# Patient Record
Sex: MALE | Race: WHITE | Employment: UNEMPLOYED | URBAN - METROPOLITAN AREA
[De-identification: names, ages, dates, MRNs, and addresses within clinical notes are randomized per-mention and may not be internally consistent; named-entity substitution may affect disease eponyms.]

---

## 2017-03-17 ENCOUNTER — HOSPITAL ENCOUNTER (OUTPATIENT)
Dept: PREADMISSION TESTING | Age: 57
Discharge: HOME | End: 2017-03-17
Payer: COMMERCIAL

## 2017-03-17 VITALS
SYSTOLIC BLOOD PRESSURE: 123 MMHG | OXYGEN SATURATION: 98 % | HEIGHT: 72 IN | BODY MASS INDEX: 30.22 KG/M2 | WEIGHT: 223.11 LBS | DIASTOLIC BLOOD PRESSURE: 71 MMHG | HEART RATE: 79 BPM

## 2017-03-17 LAB
ABSOLUTE EOS #: 0.2 K/UL (ref 0–0.4)
ABSOLUTE LYMPH #: 1.5 K/UL (ref 1–4.8)
ABSOLUTE MONO #: 0.5 K/UL (ref 0.2–0.8)
BASOPHILS # BLD: 0 % (ref 0–2)
BASOPHILS ABSOLUTE: 0 K/UL (ref 0–0.2)
DIFFERENTIAL TYPE: ABNORMAL
EKG ATRIAL RATE: 91 BPM
EKG P AXIS: 53 DEGREES
EKG P-R INTERVAL: 152 MS
EKG Q-T INTERVAL: 366 MS
EKG QRS DURATION: 86 MS
EKG QTC CALCULATION (BAZETT): 450 MS
EKG R AXIS: -7 DEGREES
EKG T AXIS: 33 DEGREES
EKG VENTRICULAR RATE: 91 BPM
EOSINOPHILS RELATIVE PERCENT: 3 % (ref 1–4)
HCT VFR BLD CALC: 39.1 % (ref 41–53)
HEMOGLOBIN: 13.6 G/DL (ref 13.5–17.5)
LYMPHOCYTES # BLD: 24 % (ref 24–44)
MCH RBC QN AUTO: 31.1 PG (ref 26–34)
MCHC RBC AUTO-ENTMCNC: 34.8 G/DL (ref 31–37)
MCV RBC AUTO: 89.4 FL (ref 80–100)
MONOCYTES # BLD: 9 % (ref 1–7)
PDW BLD-RTO: 12.6 % (ref 11.5–14.5)
PLATELET # BLD: 192 K/UL (ref 130–400)
PLATELET ESTIMATE: ABNORMAL
PMV BLD AUTO: 8.4 FL (ref 6–12)
RBC # BLD: 4.38 M/UL (ref 4.5–5.9)
RBC # BLD: ABNORMAL 10*6/UL
SEG NEUTROPHILS: 64 % (ref 36–66)
SEGMENTED NEUTROPHILS ABSOLUTE COUNT: 3.8 K/UL (ref 1.8–7.7)
WBC # BLD: 6.1 K/UL (ref 3.5–11)
WBC # BLD: ABNORMAL 10*3/UL

## 2017-03-17 PROCEDURE — 85025 COMPLETE CBC W/AUTO DIFF WBC: CPT

## 2017-03-17 PROCEDURE — 93005 ELECTROCARDIOGRAM TRACING: CPT

## 2017-03-17 PROCEDURE — 36415 COLL VENOUS BLD VENIPUNCTURE: CPT

## 2017-03-17 ASSESSMENT — PAIN DESCRIPTION - DESCRIPTORS: DESCRIPTORS: THROBBING;CONSTANT

## 2017-03-17 ASSESSMENT — PAIN DESCRIPTION - LOCATION: LOCATION: BACK

## 2017-03-17 ASSESSMENT — PAIN DESCRIPTION - ORIENTATION: ORIENTATION: LOWER

## 2017-03-17 ASSESSMENT — PAIN SCALES - GENERAL: PAINLEVEL_OUTOF10: 9

## 2017-03-29 ENCOUNTER — ANESTHESIA EVENT (OUTPATIENT)
Dept: OPERATING ROOM | Age: 57
End: 2017-03-29
Payer: COMMERCIAL

## 2017-03-30 ENCOUNTER — ANESTHESIA (OUTPATIENT)
Dept: OPERATING ROOM | Age: 57
End: 2017-03-30
Payer: COMMERCIAL

## 2017-03-30 ENCOUNTER — HOSPITAL ENCOUNTER (OUTPATIENT)
Age: 57
Setting detail: OBSERVATION
Discharge: HOME | End: 2017-03-31
Attending: PODIATRIST | Admitting: PODIATRIST
Payer: COMMERCIAL

## 2017-03-30 VITALS — SYSTOLIC BLOOD PRESSURE: 118 MMHG | DIASTOLIC BLOOD PRESSURE: 57 MMHG | OXYGEN SATURATION: 97 % | TEMPERATURE: 95.5 F

## 2017-03-30 PROCEDURE — 7100000010 HC PHASE II RECOVERY - FIRST 15 MIN: Performed by: PODIATRIST

## 2017-03-30 PROCEDURE — 7100000000 HC PACU RECOVERY - FIRST 15 MIN: Performed by: PODIATRIST

## 2017-03-30 PROCEDURE — 88302 TISSUE EXAM BY PATHOLOGIST: CPT

## 2017-03-30 PROCEDURE — 6360000002 HC RX W HCPCS: Performed by: NURSE ANESTHETIST, CERTIFIED REGISTERED

## 2017-03-30 PROCEDURE — 3600000002 HC SURGERY LEVEL 2 BASE: Performed by: PODIATRIST

## 2017-03-30 PROCEDURE — 64446 NJX AA&/STRD SC NRV NFS IMG: CPT | Performed by: ANESTHESIOLOGY

## 2017-03-30 PROCEDURE — 2500000003 HC RX 250 WO HCPCS: Performed by: NURSE ANESTHETIST, CERTIFIED REGISTERED

## 2017-03-30 PROCEDURE — 7100000001 HC PACU RECOVERY - ADDTL 15 MIN: Performed by: PODIATRIST

## 2017-03-30 PROCEDURE — 2500000003 HC RX 250 WO HCPCS: Performed by: PODIATRIST

## 2017-03-30 PROCEDURE — G0378 HOSPITAL OBSERVATION PER HR: HCPCS

## 2017-03-30 PROCEDURE — 2500000003 HC RX 250 WO HCPCS

## 2017-03-30 PROCEDURE — 6370000000 HC RX 637 (ALT 250 FOR IP): Performed by: PODIATRIST

## 2017-03-30 PROCEDURE — 3600000012 HC SURGERY LEVEL 2 ADDTL 15MIN: Performed by: PODIATRIST

## 2017-03-30 PROCEDURE — 2580000003 HC RX 258: Performed by: PODIATRIST

## 2017-03-30 PROCEDURE — 2580000003 HC RX 258: Performed by: ANESTHESIOLOGY

## 2017-03-30 PROCEDURE — 6370000000 HC RX 637 (ALT 250 FOR IP): Performed by: ANESTHESIOLOGY

## 2017-03-30 PROCEDURE — 7100000011 HC PHASE II RECOVERY - ADDTL 15 MIN: Performed by: PODIATRIST

## 2017-03-30 PROCEDURE — 3700000000 HC ANESTHESIA ATTENDED CARE: Performed by: PODIATRIST

## 2017-03-30 PROCEDURE — 3700000001 HC ADD 15 MINUTES (ANESTHESIA): Performed by: PODIATRIST

## 2017-03-30 DEVICE — IMPLANTABLE DEVICE: Type: IMPLANTABLE DEVICE | Site: ACHILLES TENDON | Status: FUNCTIONAL

## 2017-03-30 RX ORDER — SODIUM CHLORIDE 0.9 % (FLUSH) 0.9 %
10 SYRINGE (ML) INJECTION EVERY 12 HOURS SCHEDULED
Status: DISCONTINUED | OUTPATIENT
Start: 2017-03-30 | End: 2017-03-31 | Stop reason: HOSPADM

## 2017-03-30 RX ORDER — MORPHINE SULFATE 4 MG/ML
4 INJECTION, SOLUTION INTRAMUSCULAR; INTRAVENOUS
Status: DISCONTINUED | OUTPATIENT
Start: 2017-03-30 | End: 2017-03-31

## 2017-03-30 RX ORDER — SODIUM CHLORIDE 0.9 % (FLUSH) 0.9 %
10 SYRINGE (ML) INJECTION PRN
Status: DISCONTINUED | OUTPATIENT
Start: 2017-03-30 | End: 2017-03-30 | Stop reason: HOSPADM

## 2017-03-30 RX ORDER — SUCCINYLCHOLINE CHLORIDE 20 MG/ML
INJECTION INTRAMUSCULAR; INTRAVENOUS PRN
Status: DISCONTINUED | OUTPATIENT
Start: 2017-03-30 | End: 2017-03-30 | Stop reason: SDUPTHER

## 2017-03-30 RX ORDER — CLINDAMYCIN PHOSPHATE 900 MG/50ML
900 INJECTION INTRAVENOUS ONCE
Status: COMPLETED | OUTPATIENT
Start: 2017-03-30 | End: 2017-03-30

## 2017-03-30 RX ORDER — SODIUM CHLORIDE 0.9 % (FLUSH) 0.9 %
10 SYRINGE (ML) INJECTION EVERY 12 HOURS SCHEDULED
Status: DISCONTINUED | OUTPATIENT
Start: 2017-03-30 | End: 2017-03-30 | Stop reason: HOSPADM

## 2017-03-30 RX ORDER — FENTANYL CITRATE 50 UG/ML
50 INJECTION, SOLUTION INTRAMUSCULAR; INTRAVENOUS EVERY 5 MIN PRN
Status: DISCONTINUED | OUTPATIENT
Start: 2017-03-30 | End: 2017-03-30 | Stop reason: HOSPADM

## 2017-03-30 RX ORDER — KETAMINE HYDROCHLORIDE 100 MG/ML
INJECTION, SOLUTION INTRAMUSCULAR; INTRAVENOUS PRN
Status: DISCONTINUED | OUTPATIENT
Start: 2017-03-30 | End: 2017-03-30 | Stop reason: SDUPTHER

## 2017-03-30 RX ORDER — ALFUZOSIN HYDROCHLORIDE 10 MG/1
10 TABLET, EXTENDED RELEASE ORAL DAILY
Status: DISCONTINUED | OUTPATIENT
Start: 2017-03-30 | End: 2017-03-30 | Stop reason: CLARIF

## 2017-03-30 RX ORDER — PREGABALIN 100 MG/1
100 CAPSULE ORAL DAILY
Status: DISCONTINUED | OUTPATIENT
Start: 2017-03-30 | End: 2017-03-31 | Stop reason: HOSPADM

## 2017-03-30 RX ORDER — DEXAMETHASONE SODIUM PHOSPHATE 10 MG/ML
INJECTION INTRAMUSCULAR; INTRAVENOUS PRN
Status: DISCONTINUED | OUTPATIENT
Start: 2017-03-30 | End: 2017-03-30 | Stop reason: SDUPTHER

## 2017-03-30 RX ORDER — OXYCODONE HYDROCHLORIDE AND ACETAMINOPHEN 5; 325 MG/1; MG/1
1 TABLET ORAL EVERY 4 HOURS PRN
Status: DISCONTINUED | OUTPATIENT
Start: 2017-03-30 | End: 2017-03-31 | Stop reason: HOSPADM

## 2017-03-30 RX ORDER — HYDROMORPHONE HCL 110MG/55ML
0.25 PATIENT CONTROLLED ANALGESIA SYRINGE INTRAVENOUS EVERY 5 MIN PRN
Status: DISCONTINUED | OUTPATIENT
Start: 2017-03-30 | End: 2017-03-30 | Stop reason: HOSPADM

## 2017-03-30 RX ORDER — GLYCOPYRROLATE 0.2 MG/ML
INJECTION INTRAMUSCULAR; INTRAVENOUS PRN
Status: DISCONTINUED | OUTPATIENT
Start: 2017-03-30 | End: 2017-03-30 | Stop reason: SDUPTHER

## 2017-03-30 RX ORDER — HYDROMORPHONE HCL 110MG/55ML
0.5 PATIENT CONTROLLED ANALGESIA SYRINGE INTRAVENOUS EVERY 5 MIN PRN
Status: DISCONTINUED | OUTPATIENT
Start: 2017-03-30 | End: 2017-03-30 | Stop reason: HOSPADM

## 2017-03-30 RX ORDER — SCOLOPAMINE TRANSDERMAL SYSTEM 1 MG/1
1 PATCH, EXTENDED RELEASE TRANSDERMAL ONCE
Status: DISCONTINUED | OUTPATIENT
Start: 2017-03-30 | End: 2017-03-30

## 2017-03-30 RX ORDER — FENTANYL CITRATE 50 UG/ML
25 INJECTION, SOLUTION INTRAMUSCULAR; INTRAVENOUS EVERY 5 MIN PRN
Status: DISCONTINUED | OUTPATIENT
Start: 2017-03-30 | End: 2017-03-30 | Stop reason: HOSPADM

## 2017-03-30 RX ORDER — FENTANYL CITRATE 50 UG/ML
INJECTION, SOLUTION INTRAMUSCULAR; INTRAVENOUS PRN
Status: DISCONTINUED | OUTPATIENT
Start: 2017-03-30 | End: 2017-03-30 | Stop reason: SDUPTHER

## 2017-03-30 RX ORDER — ONDANSETRON 2 MG/ML
INJECTION INTRAMUSCULAR; INTRAVENOUS PRN
Status: DISCONTINUED | OUTPATIENT
Start: 2017-03-30 | End: 2017-03-30 | Stop reason: SDUPTHER

## 2017-03-30 RX ORDER — ZOLPIDEM TARTRATE 5 MG/1
5 TABLET ORAL NIGHTLY PRN
Status: DISCONTINUED | OUTPATIENT
Start: 2017-03-30 | End: 2017-03-31 | Stop reason: HOSPADM

## 2017-03-30 RX ORDER — BACLOFEN 10 MG/1
10 TABLET ORAL PRN
Status: DISCONTINUED | OUTPATIENT
Start: 2017-03-30 | End: 2017-03-31 | Stop reason: HOSPADM

## 2017-03-30 RX ORDER — BUTALBITAL, ACETAMINOPHEN AND CAFFEINE 50; 325; 40 MG/1; MG/1; MG/1
1 TABLET ORAL EVERY 6 HOURS PRN
Status: DISCONTINUED | OUTPATIENT
Start: 2017-03-30 | End: 2017-03-31 | Stop reason: HOSPADM

## 2017-03-30 RX ORDER — PROMETHAZINE HYDROCHLORIDE 25 MG/1
25 TABLET ORAL DAILY PRN
Status: DISCONTINUED | OUTPATIENT
Start: 2017-03-30 | End: 2017-03-31 | Stop reason: HOSPADM

## 2017-03-30 RX ORDER — OXYCODONE HYDROCHLORIDE AND ACETAMINOPHEN 5; 325 MG/1; MG/1
1 TABLET ORAL EVERY 6 HOURS PRN
Qty: 40 TABLET | Refills: 0 | Status: SHIPPED | OUTPATIENT
Start: 2017-03-30 | End: 2017-04-06

## 2017-03-30 RX ORDER — ACETAMINOPHEN 325 MG/1
650 TABLET ORAL EVERY 4 HOURS PRN
Status: DISCONTINUED | OUTPATIENT
Start: 2017-03-30 | End: 2017-03-31 | Stop reason: HOSPADM

## 2017-03-30 RX ORDER — ROCURONIUM BROMIDE 10 MG/ML
INJECTION, SOLUTION INTRAVENOUS PRN
Status: DISCONTINUED | OUTPATIENT
Start: 2017-03-30 | End: 2017-03-30 | Stop reason: SDUPTHER

## 2017-03-30 RX ORDER — SODIUM CHLORIDE 0.9 % (FLUSH) 0.9 %
10 SYRINGE (ML) INJECTION PRN
Status: DISCONTINUED | OUTPATIENT
Start: 2017-03-30 | End: 2017-03-31 | Stop reason: HOSPADM

## 2017-03-30 RX ORDER — DIVALPROEX SODIUM 250 MG/1
250 TABLET, EXTENDED RELEASE ORAL 2 TIMES DAILY
Status: DISCONTINUED | OUTPATIENT
Start: 2017-03-30 | End: 2017-03-31 | Stop reason: HOSPADM

## 2017-03-30 RX ORDER — LIDOCAINE HYDROCHLORIDE 10 MG/ML
1 INJECTION, SOLUTION EPIDURAL; INFILTRATION; INTRACAUDAL; PERINEURAL
Status: DISCONTINUED | OUTPATIENT
Start: 2017-03-31 | End: 2017-03-30 | Stop reason: HOSPADM

## 2017-03-30 RX ORDER — CYCLOBENZAPRINE HCL 5 MG
5 TABLET ORAL DAILY PRN
Status: DISCONTINUED | OUTPATIENT
Start: 2017-03-30 | End: 2017-03-31 | Stop reason: HOSPADM

## 2017-03-30 RX ORDER — MORPHINE SULFATE 2 MG/ML
2 INJECTION, SOLUTION INTRAMUSCULAR; INTRAVENOUS
Status: DISCONTINUED | OUTPATIENT
Start: 2017-03-30 | End: 2017-03-31

## 2017-03-30 RX ORDER — TAMSULOSIN HYDROCHLORIDE 0.4 MG/1
0.4 CAPSULE ORAL DAILY
Status: DISCONTINUED | OUTPATIENT
Start: 2017-03-30 | End: 2017-03-31 | Stop reason: HOSPADM

## 2017-03-30 RX ORDER — MIDAZOLAM HYDROCHLORIDE 1 MG/ML
INJECTION INTRAMUSCULAR; INTRAVENOUS PRN
Status: DISCONTINUED | OUTPATIENT
Start: 2017-03-30 | End: 2017-03-30 | Stop reason: SDUPTHER

## 2017-03-30 RX ORDER — ONDANSETRON 2 MG/ML
4 INJECTION INTRAMUSCULAR; INTRAVENOUS EVERY 6 HOURS PRN
Status: DISCONTINUED | OUTPATIENT
Start: 2017-03-30 | End: 2017-03-31 | Stop reason: HOSPADM

## 2017-03-30 RX ORDER — PROPOFOL 10 MG/ML
INJECTION, EMULSION INTRAVENOUS PRN
Status: DISCONTINUED | OUTPATIENT
Start: 2017-03-30 | End: 2017-03-30 | Stop reason: SDUPTHER

## 2017-03-30 RX ORDER — PROMETHAZINE HYDROCHLORIDE 25 MG/ML
6.25 INJECTION, SOLUTION INTRAMUSCULAR; INTRAVENOUS
Status: DISCONTINUED | OUTPATIENT
Start: 2017-03-30 | End: 2017-03-30 | Stop reason: HOSPADM

## 2017-03-30 RX ORDER — HYDROMORPHONE HCL 110MG/55ML
PATIENT CONTROLLED ANALGESIA SYRINGE INTRAVENOUS PRN
Status: DISCONTINUED | OUTPATIENT
Start: 2017-03-30 | End: 2017-03-30 | Stop reason: SDUPTHER

## 2017-03-30 RX ORDER — PANTOPRAZOLE SODIUM 40 MG/1
40 TABLET, DELAYED RELEASE ORAL
Status: DISCONTINUED | OUTPATIENT
Start: 2017-03-31 | End: 2017-03-31 | Stop reason: HOSPADM

## 2017-03-30 RX ORDER — DIAZEPAM 5 MG/1
5 TABLET ORAL EVERY 8 HOURS PRN
Status: DISCONTINUED | OUTPATIENT
Start: 2017-03-30 | End: 2017-03-31 | Stop reason: HOSPADM

## 2017-03-30 RX ORDER — ONDANSETRON 2 MG/ML
4 INJECTION INTRAMUSCULAR; INTRAVENOUS
Status: DISCONTINUED | OUTPATIENT
Start: 2017-03-30 | End: 2017-03-30 | Stop reason: HOSPADM

## 2017-03-30 RX ORDER — SILDENAFIL 50 MG/1
50 TABLET, FILM COATED ORAL PRN
Status: DISCONTINUED | OUTPATIENT
Start: 2017-03-30 | End: 2017-03-30

## 2017-03-30 RX ORDER — LIDOCAINE HYDROCHLORIDE 20 MG/ML
INJECTION, SOLUTION EPIDURAL; INFILTRATION; INTRACAUDAL; PERINEURAL PRN
Status: DISCONTINUED | OUTPATIENT
Start: 2017-03-30 | End: 2017-03-30 | Stop reason: SDUPTHER

## 2017-03-30 RX ORDER — SODIUM CHLORIDE, SODIUM LACTATE, POTASSIUM CHLORIDE, CALCIUM CHLORIDE 600; 310; 30; 20 MG/100ML; MG/100ML; MG/100ML; MG/100ML
INJECTION, SOLUTION INTRAVENOUS CONTINUOUS
Status: DISCONTINUED | OUTPATIENT
Start: 2017-03-31 | End: 2017-03-30

## 2017-03-30 RX ORDER — LIDOCAINE HYDROCHLORIDE 10 MG/ML
INJECTION, SOLUTION EPIDURAL; INFILTRATION; INTRACAUDAL; PERINEURAL PRN
Status: DISCONTINUED | OUTPATIENT
Start: 2017-03-30 | End: 2017-03-30 | Stop reason: HOSPADM

## 2017-03-30 RX ORDER — SODIUM CHLORIDE 9 MG/ML
INJECTION, SOLUTION INTRAVENOUS CONTINUOUS
Status: DISCONTINUED | OUTPATIENT
Start: 2017-03-31 | End: 2017-03-30

## 2017-03-30 RX ADMIN — PROPOFOL 150 MG: 10 INJECTION, EMULSION INTRAVENOUS at 14:11

## 2017-03-30 RX ADMIN — CYCLOBENZAPRINE HYDROCHLORIDE 5 MG: 5 TABLET, FILM COATED ORAL at 21:58

## 2017-03-30 RX ADMIN — KETAMINE HYDROCHLORIDE 50 MG: 100 INJECTION INTRAMUSCULAR; INTRAVENOUS at 14:15

## 2017-03-30 RX ADMIN — ROCURONIUM BROMIDE 20 MG: 10 INJECTION INTRAVENOUS at 14:25

## 2017-03-30 RX ADMIN — SODIUM CHLORIDE, POTASSIUM CHLORIDE, SODIUM LACTATE AND CALCIUM CHLORIDE: 600; 310; 30; 20 INJECTION, SOLUTION INTRAVENOUS at 12:28

## 2017-03-30 RX ADMIN — MIDAZOLAM 4 MG: 1 INJECTION INTRAMUSCULAR; INTRAVENOUS at 14:07

## 2017-03-30 RX ADMIN — CLINDAMYCIN PHOSPHATE 900 MG: 18 INJECTION, SOLUTION INTRAMUSCULAR; INTRAVENOUS at 14:07

## 2017-03-30 RX ADMIN — HYDROMORPHONE HYDROCHLORIDE 1 MG: 2 INJECTION, SOLUTION INTRAMUSCULAR; INTRAVENOUS; SUBCUTANEOUS at 15:08

## 2017-03-30 RX ADMIN — Medication 10 ML: at 22:02

## 2017-03-30 RX ADMIN — ONDANSETRON 4 MG: 2 INJECTION INTRAMUSCULAR; INTRAVENOUS at 14:07

## 2017-03-30 RX ADMIN — PREGABALIN 100 MG: 100 CAPSULE ORAL at 21:36

## 2017-03-30 RX ADMIN — FENTANYL CITRATE 100 MCG: 50 INJECTION INTRAMUSCULAR; INTRAVENOUS at 14:11

## 2017-03-30 RX ADMIN — HYDROMORPHONE HYDROCHLORIDE 0.5 MG: 2 INJECTION, SOLUTION INTRAMUSCULAR; INTRAVENOUS; SUBCUTANEOUS at 15:17

## 2017-03-30 RX ADMIN — GLYCOPYRROLATE 0.2 MG: 0.2 INJECTION, SOLUTION INTRAMUSCULAR; INTRAVENOUS at 14:44

## 2017-03-30 RX ADMIN — GLYCOPYRROLATE 0.1 MG: 0.2 INJECTION, SOLUTION INTRAMUSCULAR; INTRAVENOUS at 14:07

## 2017-03-30 RX ADMIN — SODIUM CHLORIDE, POTASSIUM CHLORIDE, SODIUM LACTATE AND CALCIUM CHLORIDE: 600; 310; 30; 20 INJECTION, SOLUTION INTRAVENOUS at 12:58

## 2017-03-30 RX ADMIN — SODIUM CHLORIDE, POTASSIUM CHLORIDE, SODIUM LACTATE AND CALCIUM CHLORIDE: 600; 310; 30; 20 INJECTION, SOLUTION INTRAVENOUS at 15:51

## 2017-03-30 RX ADMIN — SUCCINYLCHOLINE CHLORIDE 120 MG: 20 INJECTION, SOLUTION INTRAMUSCULAR; INTRAVENOUS at 14:11

## 2017-03-30 RX ADMIN — HYDROMORPHONE HYDROCHLORIDE 0.5 MG: 2 INJECTION, SOLUTION INTRAMUSCULAR; INTRAVENOUS; SUBCUTANEOUS at 15:20

## 2017-03-30 RX ADMIN — OXYCODONE HYDROCHLORIDE AND ACETAMINOPHEN 1 TABLET: 5; 325 TABLET ORAL at 20:06

## 2017-03-30 RX ADMIN — DEXAMETHASONE SODIUM PHOSPHATE 10 MG: 10 INJECTION INTRAMUSCULAR; INTRAVENOUS at 14:07

## 2017-03-30 RX ADMIN — Medication 750 ML: at 17:25

## 2017-03-30 RX ADMIN — LIDOCAINE HYDROCHLORIDE 100 MG: 20 INJECTION, SOLUTION EPIDURAL; INFILTRATION; INTRACAUDAL; PERINEURAL at 14:11

## 2017-03-30 ASSESSMENT — PAIN DESCRIPTION - DESCRIPTORS
DESCRIPTORS: BURNING;NUMBNESS
DESCRIPTORS: ACHING;DISCOMFORT

## 2017-03-30 ASSESSMENT — ENCOUNTER SYMPTOMS
SHORTNESS OF BREATH: 0
STRIDOR: 0

## 2017-03-30 ASSESSMENT — PAIN DESCRIPTION - LOCATION
LOCATION: LEG
LOCATION: ARM

## 2017-03-30 ASSESSMENT — PAIN SCALES - GENERAL
PAINLEVEL_OUTOF10: 9
PAINLEVEL_OUTOF10: 0
PAINLEVEL_OUTOF10: 10

## 2017-03-30 ASSESSMENT — PAIN DESCRIPTION - ORIENTATION
ORIENTATION: LEFT
ORIENTATION: RIGHT

## 2017-03-30 ASSESSMENT — PAIN DESCRIPTION - PAIN TYPE
TYPE: CHRONIC PAIN
TYPE: CHRONIC PAIN

## 2017-03-30 ASSESSMENT — PAIN - FUNCTIONAL ASSESSMENT: PAIN_FUNCTIONAL_ASSESSMENT: 0-10

## 2017-03-31 VITALS
WEIGHT: 223.11 LBS | OXYGEN SATURATION: 94 % | HEART RATE: 70 BPM | RESPIRATION RATE: 18 BRPM | TEMPERATURE: 97.3 F | DIASTOLIC BLOOD PRESSURE: 70 MMHG | SYSTOLIC BLOOD PRESSURE: 109 MMHG | BODY MASS INDEX: 30.22 KG/M2 | HEIGHT: 72 IN

## 2017-03-31 PROCEDURE — 6360000002 HC RX W HCPCS: Performed by: PODIATRIST

## 2017-03-31 PROCEDURE — 97535 SELF CARE MNGMENT TRAINING: CPT

## 2017-03-31 PROCEDURE — 97530 THERAPEUTIC ACTIVITIES: CPT

## 2017-03-31 PROCEDURE — 97161 PT EVAL LOW COMPLEX 20 MIN: CPT

## 2017-03-31 PROCEDURE — G8988 SELF CARE GOAL STATUS: HCPCS

## 2017-03-31 PROCEDURE — G8987 SELF CARE CURRENT STATUS: HCPCS

## 2017-03-31 PROCEDURE — G8978 MOBILITY CURRENT STATUS: HCPCS

## 2017-03-31 PROCEDURE — G0378 HOSPITAL OBSERVATION PER HR: HCPCS

## 2017-03-31 PROCEDURE — G8979 MOBILITY GOAL STATUS: HCPCS

## 2017-03-31 PROCEDURE — 6370000000 HC RX 637 (ALT 250 FOR IP): Performed by: PODIATRIST

## 2017-03-31 PROCEDURE — 97166 OT EVAL MOD COMPLEX 45 MIN: CPT

## 2017-03-31 PROCEDURE — 96372 THER/PROPH/DIAG INJ SC/IM: CPT

## 2017-03-31 RX ADMIN — PANTOPRAZOLE SODIUM 40 MG: 40 TABLET, DELAYED RELEASE ORAL at 07:40

## 2017-03-31 RX ADMIN — ENOXAPARIN SODIUM 40 MG: 40 INJECTION SUBCUTANEOUS at 08:07

## 2017-03-31 ASSESSMENT — PAIN DESCRIPTION - PROGRESSION
CLINICAL_PROGRESSION: GRADUALLY IMPROVING
CLINICAL_PROGRESSION: RAPIDLY IMPROVING
CLINICAL_PROGRESSION: GRADUALLY WORSENING
CLINICAL_PROGRESSION: GRADUALLY IMPROVING

## 2017-03-31 ASSESSMENT — PAIN DESCRIPTION - DESCRIPTORS
DESCRIPTORS: ACHING;DISCOMFORT;SHARP
DESCRIPTORS: ACHING;STABBING
DESCRIPTORS: ACHING;DISCOMFORT
DESCRIPTORS: ACHING;SHARP

## 2017-03-31 ASSESSMENT — PAIN SCALES - GENERAL
PAINLEVEL_OUTOF10: 5
PAINLEVEL_OUTOF10: 8
PAINLEVEL_OUTOF10: 9
PAINLEVEL_OUTOF10: 2

## 2017-03-31 ASSESSMENT — PAIN DESCRIPTION - PAIN TYPE
TYPE: ACUTE PAIN;SURGICAL PAIN

## 2017-03-31 ASSESSMENT — PAIN DESCRIPTION - LOCATION
LOCATION: FOOT

## 2017-03-31 ASSESSMENT — PAIN DESCRIPTION - ORIENTATION
ORIENTATION: RIGHT

## 2017-03-31 ASSESSMENT — PAIN DESCRIPTION - FREQUENCY
FREQUENCY: CONTINUOUS
FREQUENCY: INTERMITTENT

## 2017-03-31 ASSESSMENT — PAIN DESCRIPTION - ONSET
ONSET: GRADUAL
ONSET: ON-GOING

## 2017-04-03 LAB — SURGICAL PATHOLOGY REPORT: NORMAL

## 2018-02-08 PROBLEM — M84.872: Status: ACTIVE | Noted: 2018-02-08

## 2018-02-08 PROBLEM — I82.451: Status: ACTIVE | Noted: 2018-02-08

## 2018-02-08 PROBLEM — M50.221 DISPLACEMENT OF INTERVERTEBRAL DISC AT C4-C5 LEVEL: Status: ACTIVE | Noted: 2018-02-08

## 2018-02-08 PROBLEM — M54.17 LUMBOSACRAL RADICULOPATHY AT L5: Status: ACTIVE | Noted: 2018-02-08

## 2018-02-08 PROBLEM — G57.32: Status: ACTIVE | Noted: 2018-02-08

## 2018-02-08 PROBLEM — F31.9 BIPOLAR AFFECTIVE DISORDER (HCC): Status: ACTIVE | Noted: 2018-02-08

## 2018-02-08 PROBLEM — M79.18 DIFFUSE MYOFASCIAL PAIN SYNDROME: Status: ACTIVE | Noted: 2018-02-08

## 2018-02-08 PROBLEM — G60.8: Status: ACTIVE | Noted: 2018-02-08

## 2018-02-08 PROBLEM — M19.022 ARTHRITIS OF ELBOW, LEFT: Status: ACTIVE | Noted: 2018-02-08

## 2018-02-08 PROBLEM — G56.22 CUBITAL TUNNEL SYNDROME ON LEFT: Status: ACTIVE | Noted: 2018-02-08

## 2018-09-25 PROBLEM — Z86.010 HISTORY OF COLON POLYPS: Status: ACTIVE | Noted: 2018-09-25

## 2018-09-25 PROBLEM — K62.5 RECTAL BLEED: Status: ACTIVE | Noted: 2018-09-25

## 2018-09-25 PROBLEM — Z86.0100 HISTORY OF COLON POLYPS: Status: ACTIVE | Noted: 2018-09-25

## 2018-09-25 PROBLEM — R53.82 CHRONIC FATIGUE: Status: ACTIVE | Noted: 2018-09-25

## 2018-09-25 PROBLEM — E66.3 OVERWEIGHT (BMI 25.0-29.9): Status: ACTIVE | Noted: 2018-09-25

## 2018-09-25 PROBLEM — R73.01 IMPAIRED FASTING GLUCOSE: Status: ACTIVE | Noted: 2018-09-25

## 2019-01-15 ENCOUNTER — OFFICE VISIT (OUTPATIENT)
Dept: FAMILY MEDICINE CLINIC | Age: 59
End: 2019-01-15
Payer: COMMERCIAL

## 2019-01-15 VITALS
DIASTOLIC BLOOD PRESSURE: 84 MMHG | TEMPERATURE: 98 F | BODY MASS INDEX: 28.47 KG/M2 | HEART RATE: 71 BPM | HEIGHT: 75 IN | SYSTOLIC BLOOD PRESSURE: 135 MMHG | WEIGHT: 229 LBS | OXYGEN SATURATION: 97 % | RESPIRATION RATE: 16 BRPM

## 2019-01-15 DIAGNOSIS — F31.31 BIPOLAR AFFECTIVE DISORDER, CURRENTLY DEPRESSED, MILD (HCC): ICD-10-CM

## 2019-01-15 DIAGNOSIS — F32.1 MAJOR DEPRESSIVE DISORDER, SINGLE EPISODE, MODERATE (HCC): ICD-10-CM

## 2019-01-15 DIAGNOSIS — M54.17 LUMBOSACRAL RADICULOPATHY AT L5: Primary | ICD-10-CM

## 2019-01-15 DIAGNOSIS — F07.81 POSTCONCUSSION SYNDROME: ICD-10-CM

## 2019-01-15 DIAGNOSIS — M50.221 DISPLACEMENT OF INTERVERTEBRAL DISC AT C4-C5 LEVEL: ICD-10-CM

## 2019-01-15 DIAGNOSIS — M84.872: ICD-10-CM

## 2019-01-15 DIAGNOSIS — M19.022 ARTHRITIS OF ELBOW, LEFT: ICD-10-CM

## 2019-01-15 DIAGNOSIS — I82.451 DEEP VEIN THROMBOSIS OF PERONEAL VEIN, RIGHT (HCC): ICD-10-CM

## 2019-01-15 DIAGNOSIS — G43.119 INTRACTABLE MIGRAINE WITH AURA WITHOUT STATUS MIGRAINOSUS: ICD-10-CM

## 2019-01-15 DIAGNOSIS — N39.41 URGE INCONTINENCE OF URINE: ICD-10-CM

## 2019-01-15 DIAGNOSIS — N52.9 IMPOTENCE OF ORGANIC ORIGIN: ICD-10-CM

## 2019-01-15 PROCEDURE — 99214 OFFICE O/P EST MOD 30 MIN: CPT | Performed by: FAMILY MEDICINE

## 2019-01-15 ASSESSMENT — ENCOUNTER SYMPTOMS
EYE DISCHARGE: 0
SORE THROAT: 0
ABDOMINAL DISTENTION: 0
CHEST TIGHTNESS: 0
CONSTIPATION: 0
SINUS PRESSURE: 0
FACIAL SWELLING: 0
APNEA: 0
VOMITING: 0
SHORTNESS OF BREATH: 0
ANAL BLEEDING: 0
BACK PAIN: 1
WHEEZING: 0
NAUSEA: 0
COLOR CHANGE: 0
ABDOMINAL PAIN: 0
TROUBLE SWALLOWING: 0
PHOTOPHOBIA: 0
DIARRHEA: 0
EYE PAIN: 0

## 2019-01-28 DIAGNOSIS — R21 SKIN RASH: Primary | ICD-10-CM

## 2019-01-29 RX ORDER — MOMETASONE FUROATE 1 MG/G
CREAM TOPICAL
Qty: 50 G | Refills: 2 | Status: SHIPPED | OUTPATIENT
Start: 2019-01-29 | End: 2019-02-28

## 2019-02-14 ENCOUNTER — OFFICE VISIT (OUTPATIENT)
Dept: FAMILY MEDICINE CLINIC | Age: 59
End: 2019-02-14
Payer: COMMERCIAL

## 2019-02-14 VITALS
DIASTOLIC BLOOD PRESSURE: 83 MMHG | WEIGHT: 226 LBS | HEART RATE: 82 BPM | SYSTOLIC BLOOD PRESSURE: 127 MMHG | RESPIRATION RATE: 16 BRPM | HEIGHT: 75 IN | BODY MASS INDEX: 28.1 KG/M2

## 2019-02-14 DIAGNOSIS — M54.17 LUMBOSACRAL RADICULOPATHY AT L5: ICD-10-CM

## 2019-02-14 DIAGNOSIS — M50.221 DISPLACEMENT OF INTERVERTEBRAL DISC AT C4-C5 LEVEL: Primary | ICD-10-CM

## 2019-02-14 DIAGNOSIS — N52.9 IMPOTENCE OF ORGANIC ORIGIN: ICD-10-CM

## 2019-02-14 DIAGNOSIS — F32.1 MAJOR DEPRESSIVE DISORDER, SINGLE EPISODE, MODERATE (HCC): ICD-10-CM

## 2019-02-14 DIAGNOSIS — M50.30 DEGENERATION OF CERVICAL INTERVERTEBRAL DISC: ICD-10-CM

## 2019-02-14 DIAGNOSIS — I82.451 DEEP VEIN THROMBOSIS OF PERONEAL VEIN, RIGHT (HCC): ICD-10-CM

## 2019-02-14 DIAGNOSIS — N39.42 URINARY INCONTINENCE WITHOUT SENSORY AWARENESS: ICD-10-CM

## 2019-02-14 DIAGNOSIS — F07.81 POSTCONCUSSION SYNDROME: ICD-10-CM

## 2019-02-14 PROCEDURE — 99214 OFFICE O/P EST MOD 30 MIN: CPT | Performed by: FAMILY MEDICINE

## 2019-02-14 RX ORDER — MONTELUKAST SODIUM 10 MG/1
TABLET ORAL
Qty: 30 TABLET | Refills: 0 | Status: SHIPPED | OUTPATIENT
Start: 2019-02-14 | End: 2020-01-31

## 2019-02-14 RX ORDER — TRAZODONE HYDROCHLORIDE 50 MG/1
50 TABLET ORAL NIGHTLY PRN
Qty: 15 TABLET | Refills: 0 | Status: SHIPPED | OUTPATIENT
Start: 2019-02-14 | End: 2019-11-21 | Stop reason: SDUPTHER

## 2019-02-14 ASSESSMENT — ENCOUNTER SYMPTOMS
DIARRHEA: 0
CHEST TIGHTNESS: 0
VOMITING: 0
TROUBLE SWALLOWING: 0
EYE DISCHARGE: 0
NAUSEA: 0
APNEA: 0
CONSTIPATION: 0
BACK PAIN: 1
SORE THROAT: 0
WHEEZING: 0
SHORTNESS OF BREATH: 0
ABDOMINAL PAIN: 0
SINUS PRESSURE: 0
ABDOMINAL DISTENTION: 0
COLOR CHANGE: 0
PHOTOPHOBIA: 0
ANAL BLEEDING: 0
FACIAL SWELLING: 0
EYE PAIN: 0

## 2019-03-13 ENCOUNTER — OFFICE VISIT (OUTPATIENT)
Dept: FAMILY MEDICINE CLINIC | Age: 59
End: 2019-03-13
Payer: COMMERCIAL

## 2019-03-13 VITALS
RESPIRATION RATE: 14 BRPM | DIASTOLIC BLOOD PRESSURE: 80 MMHG | SYSTOLIC BLOOD PRESSURE: 135 MMHG | BODY MASS INDEX: 29.27 KG/M2 | TEMPERATURE: 97.5 F | WEIGHT: 235.4 LBS | HEART RATE: 83 BPM | HEIGHT: 75 IN

## 2019-03-13 DIAGNOSIS — M50.221 DISPLACEMENT OF INTERVERTEBRAL DISC AT C4-C5 LEVEL: Primary | ICD-10-CM

## 2019-03-13 DIAGNOSIS — M54.17 LUMBOSACRAL RADICULOPATHY AT L5: ICD-10-CM

## 2019-03-13 DIAGNOSIS — N52.9 IMPOTENCE OF ORGANIC ORIGIN: ICD-10-CM

## 2019-03-13 DIAGNOSIS — S33.5XXS LUMBAR SPRAIN, SEQUELA: ICD-10-CM

## 2019-03-13 DIAGNOSIS — M50.30 DEGENERATION OF CERVICAL INTERVERTEBRAL DISC: ICD-10-CM

## 2019-03-13 DIAGNOSIS — F07.81 POSTCONCUSSION SYNDROME: ICD-10-CM

## 2019-03-13 PROCEDURE — 99214 OFFICE O/P EST MOD 30 MIN: CPT | Performed by: FAMILY MEDICINE

## 2019-03-13 ASSESSMENT — ENCOUNTER SYMPTOMS
SHORTNESS OF BREATH: 0
EYE PAIN: 0
APNEA: 0
NAUSEA: 0
DIARRHEA: 0
EYE DISCHARGE: 0
ANAL BLEEDING: 0
COLOR CHANGE: 0
CHEST TIGHTNESS: 0
WHEEZING: 0
BACK PAIN: 1
TROUBLE SWALLOWING: 0
SINUS PRESSURE: 0
SORE THROAT: 0
ABDOMINAL DISTENTION: 0
CONSTIPATION: 0
ABDOMINAL PAIN: 0
PHOTOPHOBIA: 0
FACIAL SWELLING: 0
VOMITING: 0

## 2019-04-02 ENCOUNTER — OFFICE VISIT (OUTPATIENT)
Dept: FAMILY MEDICINE CLINIC | Age: 59
End: 2019-04-02
Payer: COMMERCIAL

## 2019-04-02 VITALS
RESPIRATION RATE: 16 BRPM | BODY MASS INDEX: 29.22 KG/M2 | HEIGHT: 75 IN | DIASTOLIC BLOOD PRESSURE: 87 MMHG | WEIGHT: 235 LBS | HEART RATE: 76 BPM | SYSTOLIC BLOOD PRESSURE: 138 MMHG

## 2019-04-02 DIAGNOSIS — F31.31 BIPOLAR AFFECTIVE DISORDER, CURRENTLY DEPRESSED, MILD (HCC): ICD-10-CM

## 2019-04-02 DIAGNOSIS — M50.30 DEGENERATION OF CERVICAL INTERVERTEBRAL DISC: Primary | ICD-10-CM

## 2019-04-02 DIAGNOSIS — G43.119 INTRACTABLE MIGRAINE WITH AURA WITHOUT STATUS MIGRAINOSUS: ICD-10-CM

## 2019-04-02 DIAGNOSIS — M54.17 LUMBOSACRAL RADICULOPATHY AT L5: ICD-10-CM

## 2019-04-02 PROCEDURE — 96372 THER/PROPH/DIAG INJ SC/IM: CPT | Performed by: FAMILY MEDICINE

## 2019-04-02 PROCEDURE — 99214 OFFICE O/P EST MOD 30 MIN: CPT | Performed by: FAMILY MEDICINE

## 2019-04-02 RX ORDER — PROMETHAZINE HYDROCHLORIDE 50 MG/ML
25 INJECTION, SOLUTION INTRAMUSCULAR; INTRAVENOUS EVERY 6 HOURS PRN
Status: SHIPPED | OUTPATIENT
Start: 2019-04-02 | End: 2019-04-03

## 2019-04-02 RX ORDER — LEVOFLOXACIN 750 MG/1
750 TABLET ORAL DAILY
Qty: 7 TABLET | Refills: 0 | Status: SHIPPED | OUTPATIENT
Start: 2019-04-02 | End: 2019-04-09

## 2019-04-02 RX ADMIN — PROMETHAZINE HYDROCHLORIDE 25 MG: 50 INJECTION, SOLUTION INTRAMUSCULAR; INTRAVENOUS at 16:39

## 2019-04-02 ASSESSMENT — ENCOUNTER SYMPTOMS
NAUSEA: 1
CONSTIPATION: 0
VOMITING: 1
SINUS PRESSURE: 0
BACK PAIN: 1
ANAL BLEEDING: 0
SORE THROAT: 0
EYE DISCHARGE: 0
CHEST TIGHTNESS: 0
APNEA: 0
FACIAL SWELLING: 0
ABDOMINAL PAIN: 0
SHORTNESS OF BREATH: 0
ABDOMINAL DISTENTION: 0
COLOR CHANGE: 0
WHEEZING: 0
TROUBLE SWALLOWING: 0
DIARRHEA: 0
EYE PAIN: 0
PHOTOPHOBIA: 0

## 2019-04-02 NOTE — PROGRESS NOTES
ACHILLES TENDON SURGERY Right 11/19/2015    lengthing    CERVICAL FUSION      C4-5    COLONOSCOPY      ELBOW SURGERY Left     ENDOSCOPY, COLON, DIAGNOSTIC      FOOT SURGERY Bilateral     FOOT SURGERY Right 03/30/2017    right achilles revision    FRACTURE SURGERY Left     elbow  fall three months ago       GASTRIC FUNDOPLICATION      LEG SURGERY Bilateral     PA DEEP DISSEC FOOT INFEC,1 BURSA Right 3/30/2017    RIGHT ACHILLES HYPERTROPHIC SCAR REVISION WITH EXCISION & ROTATIONAL FLAP  WITH PRP(CELLSAVER)  & CLARIX Tristen Shake GRAFT  performed by Krystal Campbell MD at [facility]         No family history on file.     Social History     Tobacco Use    Smoking status: Never Smoker    Smokeless tobacco: Never Used   Substance Use Topics    Alcohol use: No      Current Outpatient Medications   Medication Sig Dispense Refill    levofloxacin (LEVAQUIN) 750 MG tablet Take 1 tablet by mouth daily for 7 days 7 tablet 0    montelukast (SINGULAIR) 10 MG tablet take 1 tablet by mouth once daily 30 tablet 0    traZODone (DESYREL) 50 MG tablet Take 1 tablet by mouth nightly as needed for Sleep Take 25 mg at night 15 tablet 0    topiramate (TOPAMAX) 25 MG tablet take 1 tablet by mouth twice a day 60 tablet 3    tiZANidine (ZANAFLEX) 4 MG tablet take 1 tablet by mouth once daily 30 tablet 2    magnesium oxide (MAG-OX) 400 (240 Mg) MG tablet take 1 tablet by mouth once daily 30 tablet 3    promethazine (PHENERGAN) 25 MG tablet Take 25 mg by mouth every 8 hours as needed for Nausea      butalbital-acetaminophen-caffeine (FIORICET, ESGIC) -40 MG per tablet   0    mirtazapine (REMERON) 45 MG tablet Take 45 mg by mouth nightly   0    baclofen (LIORESAL) 10 MG tablet Take 10 mg by mouth as needed   0    warfarin (COUMADIN) 5 MG tablet   0    cyclobenzaprine (FLEXERIL) 5 MG tablet Take 5 mg by mouth daily as needed   0    DEXILANT 60 MG CPDR capsule Take 60 mg by mouth daily   0    diazepam (VALIUM) 5 MG tablet Take 5 mg by mouth as needed noon  0    LYRICA 50 MG capsule Take 100 mg by mouth daily   0    azithromycin (ZITHROMAX) 250 MG tablet take 2 tablets by mouth on day 1 in one dose then 1 tablet on days 2 through 5 6 tablet 0    VIAGRA 50 MG tablet   1    AFLURIA PRESERVATIVE FREE 0.5 ML JANA injection inject 0.5 milliliter intramuscularly  0     Current Facility-Administered Medications   Medication Dose Route Frequency Provider Last Rate Last Dose    promethazine (PHENERGAN) injection 25 mg  25 mg Intramuscular Q6H PRN Ekta Murillo MD   25 mg at 04/02/19 1639     Allergies   Allergen Reactions    Xarelto [Rivaroxaban] Shortness Of Breath    Asa [Aspirin] Other (See Comments)     intolerance    Iv Dye [Iodides]      Allergy to Car Cath Dye (from free text allergy report).  Penicillins     Sulfa Antibiotics Other (See Comments)       Health Maintenance   Topic Date Due    Hepatitis C screen  1960    A1C test (Diabetic or Prediabetic)  10/29/1969    HIV screen  10/29/1974    DTaP/Tdap/Td vaccine (1 - Tdap) 10/29/1978    Lipid screen  10/29/1999    Shingles Vaccine (1 of 2) 10/29/2009    Colon cancer screen colonoscopy  10/11/2028    Flu vaccine  Completed       Subjective:      Review of Systems   Constitutional: Negative for fatigue, fever and unexpected weight change. HENT: Negative for congestion, ear discharge, facial swelling, sinus pressure, sore throat and trouble swallowing. Eyes: Negative for photophobia, pain and discharge. Respiratory: Negative for apnea, chest tightness, shortness of breath and wheezing. Cardiovascular: Negative for chest pain and palpitations. Gastrointestinal: Positive for nausea and vomiting. Negative for abdominal distention, abdominal pain, anal bleeding, constipation and diarrhea. Endocrine: Negative for cold intolerance, heat intolerance, polydipsia, polyphagia and polyuria.    Genitourinary: Negative for difficulty urinating, flank pain, frequency and hematuria. Musculoskeletal: Positive for arthralgias, back pain, neck pain and neck stiffness. Negative for gait problem. Skin: Negative for color change and rash. Neurological: Negative for dizziness, syncope, facial asymmetry, speech difficulty and light-headedness. Hematological: Negative for adenopathy. Psychiatric/Behavioral: Positive for dysphoric mood and sleep disturbance. Negative for agitation, behavioral problems, confusion, hallucinations and suicidal ideas. The patient is nervous/anxious. The patient is not hyperactive. Objective:     Physical Exam   Constitutional: He is oriented to person, place, and time. He appears well-developed. No distress. HENT:   Head: Normocephalic. Neck: Normal range of motion. Neck supple. No thyromegaly present. Cardiovascular: Normal rate, regular rhythm and normal heart sounds. No murmur heard. Pulmonary/Chest: Breath sounds normal. He has no wheezes. He has no rales. He exhibits no tenderness. Abdominal: Soft. Bowel sounds are normal. He exhibits no distension and no mass. There is no tenderness. There is no rebound and no guarding. Musculoskeletal: He exhibits tenderness. He exhibits no edema. Cervical back: He exhibits decreased range of motion and tenderness. Back:    Lymphadenopathy:     He has no cervical adenopathy. Neurological: He is alert and oriented to person, place, and time. Skin: Skin is warm. No rash noted. Psychiatric: Judgment and thought content normal. His mood appears anxious. His affect is blunt. His speech is delayed. He is slowed. Cognition and memory are normal. He exhibits a depressed mood. Nursing note and vitals reviewed. /87   Pulse 76   Resp 16   Ht 6' 3\" (1.905 m)   Wt 235 lb (106.6 kg)   BMI 29.37 kg/m²     Assessment:       Diagnosis Orders   1. Degeneration of cervical intervertebral disc     2.  Intractable migraine with aura without status migrainosus     3. Bipolar affective disorder, currently depressed, mild ([facility] [address]     4. Lumbosacral radiculopathy at L5                   Plan:    Pt will call Pain [facility] and informed about post injections complications  Phenergan 25 mg IM now 1x  Levaquin 750 mg qd x 7 days  Cold packs  Otherwise the current medical regimen is effective;  continue present plan and medications. Return in about 2 months (around 6/2/2019), or IM Phenergan 25 mg done today. No orders of the defined types were placed in this encounter. Patient given educational materials - see patient instructions. Discussed use, benefit,and side effects of prescribed medications. All patient questions answered. Pt voiced understanding. Reviewed health maintenance. Instructed to continue current medications, diet and exercise. Patient agreed withtreatment plan. Follow up as directed. Electronically signed by Anabel Padilla MD on 4/2/2019 at 5:24 PMVisit Information    Have you changed or started any medications since your last visit including any over-the-counter medicines, vitamins, or herbal medicines? no   Are you having any side effects from any of your medications? -  no  Have you stopped taking any of your medications? Is so, why? -  no    Have you seen any other physician or provider since your last visit? Yes - Records Requested  Have you had any other diagnostic tests since your last visit? No  Have you been seen in the emergency room and/or had an admission to a hospital since we last saw you? No  Have you had your routine dental cleaning in the past 6 months? no    Have you activated your [employer] account? If not, what are your barriers?  Yes     Patient Care Team:  Anabel Padilla MD as PCP - General (Family Medicine)  Anabel Padilla MD as PCP - S Attributed Provider    Medical History Review  Past Medical, Family, and Social History reviewed and does not contribute to the patient presenting condition    Health Maintenance   Topic Date Due    Hepatitis C screen  1960    A1C test (Diabetic or Prediabetic)  10/29/1969    HIV screen  10/29/1974    DTaP/Tdap/Td vaccine (1 - Tdap) 10/29/1978    Lipid screen  10/29/1999    Shingles Vaccine (1 of 2) 10/29/2009    Colon cancer screen colonoscopy  10/11/2028    Flu vaccine  Completed

## 2019-04-03 ENCOUNTER — TELEPHONE (OUTPATIENT)
Dept: FAMILY MEDICINE CLINIC | Age: 59
End: 2019-04-03

## 2019-04-15 ENCOUNTER — OFFICE VISIT (OUTPATIENT)
Dept: FAMILY MEDICINE CLINIC | Age: 59
End: 2019-04-15
Payer: COMMERCIAL

## 2019-04-15 VITALS
BODY MASS INDEX: 29.22 KG/M2 | OXYGEN SATURATION: 97 % | WEIGHT: 235 LBS | DIASTOLIC BLOOD PRESSURE: 91 MMHG | HEIGHT: 75 IN | SYSTOLIC BLOOD PRESSURE: 146 MMHG | RESPIRATION RATE: 16 BRPM | HEART RATE: 89 BPM

## 2019-04-15 DIAGNOSIS — G43.119 INTRACTABLE MIGRAINE WITH AURA WITHOUT STATUS MIGRAINOSUS: ICD-10-CM

## 2019-04-15 DIAGNOSIS — N39.42 URINARY INCONTINENCE WITHOUT SENSORY AWARENESS: ICD-10-CM

## 2019-04-15 DIAGNOSIS — N52.9 IMPOTENCE OF ORGANIC ORIGIN: ICD-10-CM

## 2019-04-15 DIAGNOSIS — M50.30 DEGENERATION OF CERVICAL INTERVERTEBRAL DISC: Primary | ICD-10-CM

## 2019-04-15 DIAGNOSIS — S33.5XXS LUMBAR SPRAIN, SEQUELA: ICD-10-CM

## 2019-04-15 DIAGNOSIS — F31.31 BIPOLAR AFFECTIVE DISORDER, CURRENTLY DEPRESSED, MILD (HCC): ICD-10-CM

## 2019-04-15 DIAGNOSIS — M54.17 LUMBOSACRAL RADICULOPATHY AT L5: ICD-10-CM

## 2019-04-15 DIAGNOSIS — F07.81 POSTCONCUSSION SYNDROME: ICD-10-CM

## 2019-04-15 DIAGNOSIS — I82.451 DEEP VEIN THROMBOSIS OF PERONEAL VEIN, RIGHT (HCC): ICD-10-CM

## 2019-04-15 DIAGNOSIS — M50.221 DISPLACEMENT OF INTERVERTEBRAL DISC AT C4-C5 LEVEL: ICD-10-CM

## 2019-04-15 PROCEDURE — 99214 OFFICE O/P EST MOD 30 MIN: CPT | Performed by: FAMILY MEDICINE

## 2019-04-15 ASSESSMENT — ENCOUNTER SYMPTOMS
NAUSEA: 0
EYE PAIN: 0
CHEST TIGHTNESS: 0
APNEA: 0
FACIAL SWELLING: 0
ABDOMINAL DISTENTION: 0
CONSTIPATION: 0
SINUS PRESSURE: 0
ABDOMINAL PAIN: 0
SORE THROAT: 0
SHORTNESS OF BREATH: 0
TROUBLE SWALLOWING: 0
PHOTOPHOBIA: 0
ANAL BLEEDING: 0
VOMITING: 0
COLOR CHANGE: 0
BACK PAIN: 1
DIARRHEA: 0
EYE DISCHARGE: 0
WHEEZING: 0

## 2019-04-15 NOTE — PROGRESS NOTES
Alok Hansen MD, PhD FAA[address] [address]      Veena Conn is a 60 y.o. male who presents today for his medical conditions/complaints as noted below. Veena Conn is c/o of   Chief Complaint   Patient presents with    Other     [facility] follow-up    Joint Pain    Back Pain         HPI:     Back Pain   This is a chronic problem. The current episode started more than 1 year ago. The problem occurs constantly. The problem has been gradually worsening since onset. The pain is present in the lumbar spine. The pain is moderate. The symptoms are aggravated by bending, lying down, sitting, stress, twisting, standing, position and coughing. Stiffness is present in the morning. Associated symptoms include numbness and weakness. Pertinent negatives include no abdominal pain, chest pain or fever. Neck Pain    This is a chronic problem. The current episode started more than 1 year ago. The problem has been waxing and waning. The quality of the pain is described as cramping, burning and aching. The pain is at a severity of 5/10. The pain is moderate. The symptoms are aggravated by bending, position, twisting and coughing. Associated symptoms include numbness and weakness. Pertinent negatives include no chest pain, fever, photophobia or trouble swallowing.        No results found for: LABA1C          ( goal A1C is < 7)   No results found for: LABMICR  No results found for: LDLCHOLESTEROL, LDLCALC    (goal LDL is <100)   BUN (mg/dL)   Date Value   12/30/2015 13     BP Readings from Last 3 Encounters:   04/15/19 (!) 146/91   04/02/19 138/87   03/13/19 135/80          (goal 120/80)    Past Medical History:   Diagnosis Date    Anemia     Hyperplastic anemia yrs ago    Anesthesia complication     \"angry after surgery can pull things out\"    Contusion of back(922.31)     wc    DDD (degenerative disc disease)     H/O cardiac catheterization no stent    Hx of blood clots     after last ankle surgery    Impotence of organic origin     wc    Leaky heart valve     \"slight\"    Major depressive disorder, single episode, moderate (HCC)     wc    Migraine with aura, with intractable migraine, so stated, without mention of status migrainosus     wc   nausea and vominting daily patient on zofran     Other disorders of bone and cartilage(223.99)     wc    PONV (postoperative nausea and vomiting)     Postconcussion syndrome     wc    Risk for falls     Self-catheterizes urinary bladder     Sprain of ankle, unspecified site     wc    Sprain of foot, unspecified site     wc    Sprain of lumbar region     wc    Unspecified urinary incontinence     wc      Past Surgical History:   Procedure Laterality Date    ACHILLES TENDON SURGERY Right 11/19/2015    lengthing    CERVICAL FUSION      C4-5    COLONOSCOPY      ELBOW SURGERY Left     ENDOSCOPY, COLON, DIAGNOSTIC      FOOT SURGERY Bilateral     FOOT SURGERY Right 03/30/2017    right achilles revision    FRACTURE SURGERY Left     elbow  fall three months ago       GASTRIC FUNDOPLICATION      LEG SURGERY Bilateral     ID DEEP DISSEC FOOT INFEC,1 BURSA Right 3/30/2017    RIGHT ACHILLES HYPERTROPHIC SCAR REVISION WITH EXCISION & ROTATIONAL FLAP  WITH PRP(CELLSAVER)  & CLARIX Zia He GRAFT  performed by Doug Mckinney MD at [facility]         No family history on file.     Social History     Tobacco Use    Smoking status: Never Smoker    Smokeless tobacco: Never Used   Substance Use Topics    Alcohol use: No      Current Outpatient Medications   Medication Sig Dispense Refill    montelukast (SINGULAIR) 10 MG tablet take 1 tablet by mouth once daily 30 tablet 0    traZODone (DESYREL) 50 MG tablet Take 1 tablet by mouth nightly as needed for Sleep Take 25 mg at night 15 tablet 0    topiramate (TOPAMAX) 25 MG tablet take 1 tablet by mouth twice a day 60 tablet 3    tiZANidine (ZANAFLEX) 4 MG tablet take 1 tablet by mouth once daily 30 tablet 2    magnesium oxide (MAG-OX) 400 (240 Mg) MG tablet take 1 tablet by mouth once daily 30 tablet 3    promethazine (PHENERGAN) 25 MG tablet Take 25 mg by mouth every 8 hours as needed for Nausea      VIAGRA 50 MG tablet   1    butalbital-acetaminophen-caffeine (FIORICET, ESGIC) -40 MG per tablet   0    mirtazapine (REMERON) 45 MG tablet Take 45 mg by mouth nightly   0    baclofen (LIORESAL) 10 MG tablet Take 10 mg by mouth as needed   0    cyclobenzaprine (FLEXERIL) 5 MG tablet Take 5 mg by mouth daily as needed   0    DEXILANT 60 MG CPDR capsule Take 60 mg by mouth daily   0    diazepam (VALIUM) 5 MG tablet Take 5 mg by mouth as needed noon  0    LYRICA 50 MG capsule Take 100 mg by mouth daily   0    azithromycin (ZITHROMAX) 250 MG tablet take 2 tablets by mouth on day 1 in one dose then 1 tablet on days 2 through 5 6 tablet 0    AFLURIA PRESERVATIVE FREE 0.5 ML JANA injection inject 0.5 milliliter intramuscularly  0    warfarin (COUMADIN) 5 MG tablet   0     No current facility-administered medications for this visit. Allergies   Allergen Reactions    Xarelto [Rivaroxaban] Shortness Of Breath    Asa [Aspirin] Other (See Comments)     intolerance    Iv Dye [Iodides]      Allergy to Car Cath Dye (from free text allergy report).  Penicillins     Sulfa Antibiotics Other (See Comments)       Health Maintenance   Topic Date Due    Hepatitis C screen  1960    A1C test (Diabetic or Prediabetic)  10/29/1969    HIV screen  10/29/1974    DTaP/Tdap/Td vaccine (1 - Tdap) 10/29/1978    Lipid screen  10/29/1999    Shingles Vaccine (1 of 2) 10/29/2009    Colon cancer screen colonoscopy  10/11/2028    Flu vaccine  Completed    Pneumococcal 0-64 years Vaccine  Aged Out       Subjective:      Review of Systems   Constitutional: Negative for fatigue, fever and unexpected weight change.    HENT: Negative for congestion, ear 4/15/2019 at 6:23 PMVisit Information    Have you changed or started any medications since your last visit including any over-the-counter medicines, vitamins, or herbal medicines? no   Are you having any side effects from any of your medications? -  no  Have you stopped taking any of your medications? Is so, why? -  no    Have you seen any other physician or provider since your last visit? No  Have you had any other diagnostic tests since your last visit? No  Have you been seen in the emergency room and/or had an admission to a hospital since we last saw you? No  Have you had your routine dental cleaning in the past 6 months? no    Have you activated your [employer] account? If not, what are your barriers?  Yes     Patient Care Team:  Tom Marshall MD as PCP - General (Family Medicine)  Tom Marshall MD as PCP - [facility] Attributed Provider    Medical History Review  Past Medical, Family, and Social History reviewed and does not contribute to the patient presenting condition    Health Maintenance   Topic Date Due    Hepatitis C screen  1960    A1C test (Diabetic or Prediabetic)  10/29/1969    HIV screen  10/29/1974    DTaP/Tdap/Td vaccine (1 - Tdap) 10/29/1978    Lipid screen  10/29/1999    Shingles Vaccine (1 of 2) 10/29/2009    Colon cancer screen colonoscopy  10/11/2028    Flu vaccine  Completed    Pneumococcal 0-64 years Vaccine  Aged Out

## 2019-04-16 ENCOUNTER — TELEPHONE (OUTPATIENT)
Dept: FAMILY MEDICINE CLINIC | Age: 59
End: 2019-04-16

## 2019-04-16 NOTE — TELEPHONE ENCOUNTER
Pt called stated he was supposed to have had Rx called in yesterday from visit with Dr Rajan Blank.  Would like a call back from [facility]

## 2019-04-19 RX ORDER — FLUCONAZOLE 200 MG/1
TABLET ORAL
Qty: 14 TABLET | Refills: 0 | Status: SHIPPED | OUTPATIENT
Start: 2019-04-19 | End: 2021-01-20

## 2019-04-19 NOTE — TELEPHONE ENCOUNTER
Pt states he was suppose to get a script for diflucan for jock itch sent to pharmacy. Pt states lotrim does not work and was told diflucan would be sent in.

## 2019-05-14 ENCOUNTER — OFFICE VISIT (OUTPATIENT)
Dept: FAMILY MEDICINE CLINIC | Age: 59
End: 2019-05-14
Payer: COMMERCIAL

## 2019-05-14 VITALS
WEIGHT: 234 LBS | HEART RATE: 74 BPM | DIASTOLIC BLOOD PRESSURE: 88 MMHG | HEIGHT: 75 IN | OXYGEN SATURATION: 95 % | SYSTOLIC BLOOD PRESSURE: 120 MMHG | BODY MASS INDEX: 29.09 KG/M2

## 2019-05-14 DIAGNOSIS — M50.30 DEGENERATION OF CERVICAL INTERVERTEBRAL DISC: Primary | ICD-10-CM

## 2019-05-14 DIAGNOSIS — M79.18 DIFFUSE MYOFASCIAL PAIN SYNDROME: ICD-10-CM

## 2019-05-14 DIAGNOSIS — S20.221S: ICD-10-CM

## 2019-05-14 DIAGNOSIS — N39.42 URINARY INCONTINENCE WITHOUT SENSORY AWARENESS: ICD-10-CM

## 2019-05-14 DIAGNOSIS — I82.451 DEEP VEIN THROMBOSIS OF PERONEAL VEIN, RIGHT (HCC): ICD-10-CM

## 2019-05-14 DIAGNOSIS — F32.1 MAJOR DEPRESSIVE DISORDER, SINGLE EPISODE, MODERATE (HCC): ICD-10-CM

## 2019-05-14 DIAGNOSIS — M50.221 DISPLACEMENT OF INTERVERTEBRAL DISC AT C4-C5 LEVEL: ICD-10-CM

## 2019-05-14 DIAGNOSIS — F31.31 BIPOLAR AFFECTIVE DISORDER, CURRENTLY DEPRESSED, MILD (HCC): ICD-10-CM

## 2019-05-14 DIAGNOSIS — N52.9 IMPOTENCE OF ORGANIC ORIGIN: ICD-10-CM

## 2019-05-14 DIAGNOSIS — M19.022 ARTHRITIS OF ELBOW, LEFT: ICD-10-CM

## 2019-05-14 DIAGNOSIS — S93.401S SPRAIN OF RIGHT ANKLE, UNSPECIFIED LIGAMENT, SEQUELA: ICD-10-CM

## 2019-05-14 DIAGNOSIS — G43.119 INTRACTABLE MIGRAINE WITH AURA WITHOUT STATUS MIGRAINOSUS: ICD-10-CM

## 2019-05-14 DIAGNOSIS — F07.81 POSTCONCUSSION SYNDROME: ICD-10-CM

## 2019-05-14 DIAGNOSIS — S33.5XXS LUMBAR SPRAIN, SEQUELA: ICD-10-CM

## 2019-05-14 DIAGNOSIS — M54.17 LUMBOSACRAL RADICULOPATHY AT L5: ICD-10-CM

## 2019-05-14 PROCEDURE — 99214 OFFICE O/P EST MOD 30 MIN: CPT | Performed by: FAMILY MEDICINE

## 2019-05-14 ASSESSMENT — ENCOUNTER SYMPTOMS
ABDOMINAL PAIN: 0
PHOTOPHOBIA: 0
CHEST TIGHTNESS: 0
SORE THROAT: 0
BACK PAIN: 1
ANAL BLEEDING: 0
SINUS PRESSURE: 0
SHORTNESS OF BREATH: 0
CONSTIPATION: 0
WHEEZING: 0
DIARRHEA: 0
EYE DISCHARGE: 0
FACIAL SWELLING: 0
TROUBLE SWALLOWING: 0
NAUSEA: 0
VOMITING: 0
COLOR CHANGE: 0
APNEA: 0
EYE PAIN: 0
ABDOMINAL DISTENTION: 0

## 2019-05-14 NOTE — PROGRESS NOTES
Sahara Rose MD, PhD FAA[address] [address]      Agustín Quijano is a 60 y.o. male who presents today for his medical conditions/complaints as noted below. Agustín Quijano is c/o of   Chief Complaint   Patient presents with    Joint Pain     [facility] ck up    Back Pain         HPI:     Back Pain   This is a chronic problem. The current episode started more than 1 year ago. The problem has been waxing and waning since onset. The pain is present in the lumbar spine and sacro-iliac. Associated symptoms include numbness and weakness. Pertinent negatives include no abdominal pain, chest pain or fever. Neck Pain    This is a chronic problem. The current episode started more than 1 year ago. The problem occurs constantly. The problem has been gradually worsening. The pain is present in the occipital region. The quality of the pain is described as aching, burning and shooting. The pain is at a severity of 5/10. The pain is moderate. The symptoms are aggravated by position, bending and twisting. The pain is worse during the day. Stiffness is present in the morning. Associated symptoms include numbness and weakness. Pertinent negatives include no chest pain, fever, photophobia or trouble swallowing.        No results found for: LABA1C          ( goal A1C is < 7)   No results found for: LABMICR  No results found for: LDLCHOLESTEROL, LDLCALC    (goal LDL is <100)   BUN (mg/dL)   Date Value   12/30/2015 13     BP Readings from Last 3 Encounters:   05/14/19 120/88   04/15/19 (!) 146/91   04/02/19 138/87          (goal 120/80)    Past Medical History:   Diagnosis Date    Anemia     Hyperplastic anemia yrs ago    Anesthesia complication     \"angry after surgery can pull things out\"    Contusion of back(922.31)     wc    DDD (degenerative disc disease)     H/O cardiac catheterization     no stent    Hx of blood clots     after last ankle surgery  Impotence of organic origin     wc    Leaky heart valve     \"slight\"    Major depressive disorder, single episode, moderate (HCC)     wc    Migraine with aura, with intractable migraine, so stated, without mention of status migrainosus     wc   nausea and vominting daily patient on zofran     Other disorders of bone and cartilage(193.99)     wc    PONV (postoperative nausea and vomiting)     Postconcussion syndrome     wc    Risk for falls     Self-catheterizes urinary bladder     Sprain of ankle, unspecified site     wc    Sprain of foot, unspecified site     wc    Sprain of lumbar region     wc    Unspecified urinary incontinence     wc      Past Surgical History:   Procedure Laterality Date    ACHILLES TENDON SURGERY Right 11/19/2015    lengthing    CERVICAL FUSION      C4-5    COLONOSCOPY      ELBOW SURGERY Left     ENDOSCOPY, COLON, DIAGNOSTIC      FOOT SURGERY Bilateral     FOOT SURGERY Right 03/30/2017    right achilles revision    FRACTURE SURGERY Left     elbow  fall three months ago       GASTRIC FUNDOPLICATION      LEG SURGERY Bilateral     OR DEEP DISSEC FOOT INFEC,1 BURSA Right 3/30/2017    RIGHT ACHILLES HYPERTROPHIC SCAR REVISION WITH EXCISION & ROTATIONAL FLAP  WITH PRP(CELLSAVER)  & CLARIX [place] GRAFT  performed by Chris Rogers MD at [facility]         No family history on file.     Social History     Tobacco Use    Smoking status: Never Smoker    Smokeless tobacco: Never Used   Substance Use Topics    Alcohol use: No      Current Outpatient Medications   Medication Sig Dispense Refill    montelukast (SINGULAIR) 10 MG tablet take 1 tablet by mouth once daily 30 tablet 0    traZODone (DESYREL) 50 MG tablet Take 1 tablet by mouth nightly as needed for Sleep Take 25 mg at night 15 tablet 0    topiramate (TOPAMAX) 25 MG tablet take 1 tablet by mouth twice a day 60 tablet 3    tiZANidine (ZANAFLEX) 4 MG tablet take 1 tablet by mouth once daily 30 tablet 2 pain and discharge. Respiratory: Negative for apnea, chest tightness, shortness of breath and wheezing. Cardiovascular: Negative for chest pain and palpitations. Gastrointestinal: Negative for abdominal distention, abdominal pain, anal bleeding, constipation, diarrhea, nausea and vomiting. Endocrine: Negative for cold intolerance, heat intolerance, polydipsia, polyphagia and polyuria. Genitourinary: Negative for difficulty urinating, flank pain, frequency and hematuria. Musculoskeletal: Positive for back pain, gait problem, neck pain and neck stiffness. Skin: Negative for color change and rash. Neurological: Positive for weakness and numbness. Negative for dizziness, syncope, facial asymmetry, speech difficulty and light-headedness. Hematological: Negative for adenopathy. Psychiatric/Behavioral: Positive for dysphoric mood and sleep disturbance. Negative for agitation, behavioral problems, confusion, hallucinations and suicidal ideas. The patient is nervous/anxious. The patient is not hyperactive. Objective:     Physical Exam   Constitutional: He is oriented to person, place, and time. He appears well-developed. No distress. HENT:   Head: Normocephalic. Neck: Normal range of motion. Neck supple. No thyromegaly present. Cardiovascular: Normal rate, regular rhythm and normal heart sounds. No murmur heard. Pulmonary/Chest: Breath sounds normal. He has no wheezes. He has no rales. He exhibits no tenderness. Abdominal: Soft. Bowel sounds are normal. He exhibits no distension and no mass. There is no tenderness. There is no rebound and no guarding. Musculoskeletal: He exhibits tenderness. He exhibits no edema. Lumbar back: He exhibits decreased range of motion and tenderness. Back:    Lymphadenopathy:     He has no cervical adenopathy. Neurological: He is alert and oriented to person, place, and time. Skin: Skin is warm. No rash noted.    Psychiatric: Judgment and thought content normal. His affect is blunt and labile. His speech is delayed. He is slowed. Cognition and memory are normal.   Nursing note and vitals reviewed. /88   Pulse 74   Ht 6' 3\" (1.905 m)   Wt 234 lb (106.1 kg)   SpO2 95%   BMI 29.25 kg/m²     Assessment:       Diagnosis Orders   1. Degeneration of cervical intervertebral disc     2. Intractable migraine with aura without status migrainosus     3. Bipolar affective disorder, currently depressed, mild ([address]     4. Lumbosacral radiculopathy at L5     5. Impotence of organic origin     6. Postconcussion syndrome     7. Displacement of intervertebral disc at C4-C5 level     8. Lumbar sprain, sequela     9. Urinary incontinence without sensory awareness     10. Deep vein thrombosis of peroneal vein, right ([address]     11. Major depressive disorder, single episode, moderate ([address]     12. Arthritis of elbow, left     13. Contusion of right side of back, sequela     14. Diffuse myofascial pain syndrome     15. Sprain of right ankle, unspecified ligament, sequela                   Plan:     The current medical regimen is effective;  continue present plan and medications. Isometric cervical and lumbar exercise  Supportive tx of H/A's  Strengthening of left U&L extremities  As per pt's report: anterior cervical decompression with fusion by Dr Shruthi Kaplan in the near future    Return in about 2 months (around 7/14/2019), or ( this is f/u visit for [facility]), for follow up. No orders of the defined types were placed in this encounter. Patient given educational materials - see patient instructions. Discussed use, benefit,and side effects of prescribed medications. All patient questions answered. Pt voiced understanding. Reviewed health maintenance. Instructed to continue current medications, diet and exercise. Patient agreed withtreatment plan. Follow up as directed.      Electronically signed by Melodye Phoenix, MD on 5/14/2019 at 6:45 PMVisit Information    Have you changed or started any medications since your last visit including any over-the-counter medicines, vitamins, or herbal medicines? no   Are you having any side effects from any of your medications? -  no  Have you stopped taking any of your medications? Is so, why? -  no    Have you seen any other physician or provider since your last visit? No  Have you had any other diagnostic tests since your last visit? No  Have you been seen in the emergency room and/or had an admission to a hospital since we last saw you? No  Have you had your routine dental cleaning in the past 6 months? no    Have you activated your [employer] account? If not, what are your barriers?  No:     Patient Care Team:  Marzella Boxer, MD as PCP - General (Family Medicine)  Marzella Boxer, MD as PCP - [facility] Attributed Provider    Medical History Review  Past Medical, Family, and Social History reviewed and does not contribute to the patient presenting condition    Health Maintenance   Topic Date Due    Hepatitis C screen  1960    A1C test (Diabetic or Prediabetic)  10/29/1969    HIV screen  10/29/1974    DTaP/Tdap/Td vaccine (1 - Tdap) 10/29/1978    Lipid screen  10/29/1999    Shingles Vaccine (1 of 2) 10/29/2009    Colon cancer screen colonoscopy  10/11/2028    Flu vaccine  Completed    Pneumococcal 0-64 years Vaccine  Aged Out

## 2019-06-07 RX ORDER — MOMETASONE FUROATE 1 MG/G
CREAM TOPICAL
Qty: 45 TUBE | Refills: 0 | Status: SHIPPED | OUTPATIENT
Start: 2019-06-07 | End: 2022-04-05 | Stop reason: ALTCHOICE

## 2019-06-14 ENCOUNTER — OFFICE VISIT (OUTPATIENT)
Dept: FAMILY MEDICINE CLINIC | Age: 59
End: 2019-06-14
Payer: COMMERCIAL

## 2019-06-14 VITALS
HEIGHT: 75 IN | HEART RATE: 89 BPM | BODY MASS INDEX: 28.6 KG/M2 | OXYGEN SATURATION: 95 % | DIASTOLIC BLOOD PRESSURE: 77 MMHG | SYSTOLIC BLOOD PRESSURE: 115 MMHG | WEIGHT: 230 LBS

## 2019-06-14 DIAGNOSIS — F31.31 BIPOLAR AFFECTIVE DISORDER, CURRENTLY DEPRESSED, MILD (HCC): ICD-10-CM

## 2019-06-14 DIAGNOSIS — G43.119 INTRACTABLE MIGRAINE WITH AURA WITHOUT STATUS MIGRAINOSUS: ICD-10-CM

## 2019-06-14 DIAGNOSIS — N39.41 URGE INCONTINENCE OF URINE: ICD-10-CM

## 2019-06-14 DIAGNOSIS — S33.5XXS LUMBAR SPRAIN, SEQUELA: ICD-10-CM

## 2019-06-14 DIAGNOSIS — F32.1 MAJOR DEPRESSIVE DISORDER, SINGLE EPISODE, MODERATE (HCC): ICD-10-CM

## 2019-06-14 DIAGNOSIS — M19.022 ARTHRITIS OF ELBOW, LEFT: ICD-10-CM

## 2019-06-14 DIAGNOSIS — S20.221S: ICD-10-CM

## 2019-06-14 DIAGNOSIS — M54.17 LUMBOSACRAL RADICULOPATHY AT L5: ICD-10-CM

## 2019-06-14 DIAGNOSIS — M50.221 DISPLACEMENT OF INTERVERTEBRAL DISC AT C4-C5 LEVEL: Primary | ICD-10-CM

## 2019-06-14 DIAGNOSIS — I82.451 DEEP VEIN THROMBOSIS OF PERONEAL VEIN, RIGHT (HCC): ICD-10-CM

## 2019-06-14 DIAGNOSIS — N52.9 IMPOTENCE OF ORGANIC ORIGIN: ICD-10-CM

## 2019-06-14 DIAGNOSIS — F07.81 POSTCONCUSSION SYNDROME: ICD-10-CM

## 2019-06-14 DIAGNOSIS — S93.401S SPRAIN OF RIGHT ANKLE, UNSPECIFIED LIGAMENT, SEQUELA: ICD-10-CM

## 2019-06-14 DIAGNOSIS — M79.18 DIFFUSE MYOFASCIAL PAIN SYNDROME: ICD-10-CM

## 2019-06-14 PROCEDURE — 99214 OFFICE O/P EST MOD 30 MIN: CPT | Performed by: FAMILY MEDICINE

## 2019-06-14 RX ORDER — TIZANIDINE 4 MG/1
TABLET ORAL
Qty: 30 TABLET | Refills: 2 | Status: SHIPPED | OUTPATIENT
Start: 2019-06-14 | End: 2019-07-14

## 2019-06-14 ASSESSMENT — ENCOUNTER SYMPTOMS
APNEA: 0
NAUSEA: 0
COLOR CHANGE: 0
SINUS PRESSURE: 0
EYE DISCHARGE: 0
VOMITING: 0
EYE PAIN: 0
SORE THROAT: 0
CHEST TIGHTNESS: 0
CONSTIPATION: 0
FACIAL SWELLING: 0
BACK PAIN: 1
ABDOMINAL DISTENTION: 0
PHOTOPHOBIA: 0
ABDOMINAL PAIN: 0
ANAL BLEEDING: 0
TROUBLE SWALLOWING: 0
SHORTNESS OF BREATH: 0
DIARRHEA: 0
WHEEZING: 0

## 2019-06-14 NOTE — PROGRESS NOTES
Pearl Rocha MD, PhD FAA[address] [address]      Melly Puentes is a 60 y.o. male who presents today for his medical conditions/complaints as noted below. Melly Puentes is c/o of   Chief Complaint   Patient presents with    Neck Pain     [facility] ck up    Anxiety    Depression    Back Pain         HPI:     Neck Pain    This is a chronic problem. The current episode started more than 1 year ago. The problem occurs constantly. The problem has been waxing and waning. The pain is associated with a remote injury. The pain is present in the occipital region. The quality of the pain is described as aching, burning, cramping and shooting. The pain is at a severity of 5/10. The pain is moderate. The symptoms are aggravated by bending, coughing, position, sneezing, swallowing and twisting. The pain is same all the time. Stiffness is present in the morning. Associated symptoms include headaches, numbness, paresis, tingling and weakness. Pertinent negatives include no chest pain, fever, photophobia or trouble swallowing. He has tried acetaminophen, bed rest, chiropractic manipulation, heat, home exercises, ice, muscle relaxants, neck support, NSAIDs and oral narcotics for the symptoms. The treatment provided mild relief. Back Pain   This is a chronic problem. The current episode started more than 1 year ago. The problem occurs 2 to 4 times per day. The problem has been gradually improving since onset. The pain is present in the lumbar spine and sacro-iliac. The quality of the pain is described as burning, cramping and aching. The pain radiates to the right foot, right thigh, right knee and left thigh. The pain is at a severity of 3/10. The pain is moderate. The pain is worse during the day. The symptoms are aggravated by bending, coughing, position, standing and twisting. Stiffness is present at night.  Associated symptoms include headaches, numbness, paresis, paresthesias, tingling and weakness. Pertinent negatives include no abdominal pain, chest pain or fever. He has tried analgesics, bed rest, heat, home exercises, chiropractic manipulation, ice, muscle relaxant, NSAIDs and walking for the symptoms. The treatment provided mild relief.        No results found for: LABA1C          ( goal A1C is < 7)   No results found for: LABMICR  No results found for: LDLCHOLESTEROL, LDLCALC    (goal LDL is <100)   BUN (mg/dL)   Date Value   12/30/2015 13     BP Readings from Last 3 Encounters:   06/14/19 115/77   05/14/19 120/88   04/15/19 (!) 146/91          (goal 120/80)    Past Medical History:   Diagnosis Date    Anemia     Hyperplastic anemia yrs ago    Anesthesia complication     \"angry after surgery can pull things out\"    Contusion of back(922.31)     wc    DDD (degenerative disc disease)     H/O cardiac catheterization     no stent    Hx of blood clots     after last ankle surgery    Impotence of organic origin     wc    Leaky heart valve     \"slight\"    Major depressive disorder, single episode, moderate ([facility]     wc    Migraine with aura, with intractable migraine, so stated, without mention of status migrainosus     wc   nausea and vominting daily patient on zofran     Other disorders of bone and cartilage(733.99)     wc    PONV (postoperative nausea and vomiting)     Postconcussion syndrome     wc    Risk for falls     Self-catheterizes urinary bladder     Sprain of ankle, unspecified site     wc    Sprain of foot, unspecified site     wc    Sprain of lumbar region     wc    Unspecified urinary incontinence     wc      Past Surgical History:   Procedure Laterality Date    ACHILLES TENDON SURGERY Right 11/19/2015    lengthing    CERVICAL FUSION      C4-5    COLONOSCOPY      ELBOW SURGERY Left     ENDOSCOPY, COLON, DIAGNOSTIC      FOOT SURGERY Bilateral     FOOT SURGERY Right 03/30/2017    right achilles revision    FRACTURE mg by mouth daily   0     No current facility-administered medications for this visit. Allergies   Allergen Reactions    Xarelto [Rivaroxaban] Shortness Of Breath    Asa [Aspirin] Other (See Comments)     intolerance    Iv Dye [Iodides]      Allergy to Car Cath Dye (from free text allergy report).  Penicillins     Sulfa Antibiotics Other (See Comments)       Health Maintenance   Topic Date Due    Hepatitis C screen  1960    A1C test (Diabetic or Prediabetic)  10/29/1969    HIV screen  10/29/1974    DTaP/Tdap/Td vaccine (1 - Tdap) 10/29/1978    Lipid screen  10/29/1999    Shingles Vaccine (1 of 2) 10/29/2009    Colon cancer screen colonoscopy  10/11/2028    Flu vaccine  Completed    Pneumococcal 0-64 years Vaccine  Aged Out       Subjective:      Review of Systems   Constitutional: Negative for fatigue, fever and unexpected weight change. HENT: Negative for congestion, ear discharge, facial swelling, sinus pressure, sore throat and trouble swallowing. Eyes: Negative for photophobia, pain and discharge. Respiratory: Negative for apnea, chest tightness, shortness of breath and wheezing. Cardiovascular: Negative for chest pain and palpitations. Gastrointestinal: Negative for abdominal distention, abdominal pain, anal bleeding, constipation, diarrhea, nausea and vomiting. Endocrine: Negative for cold intolerance, heat intolerance, polydipsia, polyphagia and polyuria. Genitourinary: Positive for difficulty urinating. Negative for flank pain, frequency and hematuria. Musculoskeletal: Positive for arthralgias, back pain, gait problem, neck pain and neck stiffness. Skin: Negative for color change and rash. Neurological: Positive for tingling, weakness, numbness, headaches and paresthesias. Negative for dizziness, syncope, facial asymmetry, speech difficulty and light-headedness. Hematological: Negative for adenopathy.    Psychiatric/Behavioral: Positive for decreased concentration, dysphoric mood and sleep disturbance. Negative for agitation, behavioral problems, confusion, hallucinations and suicidal ideas. The patient is nervous/anxious. The patient is not hyperactive. Objective:     Physical Exam   Constitutional: He is oriented to person, place, and time. He appears well-developed. No distress. HENT:   Head: Normocephalic. Neck: Normal range of motion. Neck supple. No thyromegaly present. Cardiovascular: Normal rate, regular rhythm and normal heart sounds. No murmur heard. Pulmonary/Chest: He has wheezes. He has no rales. He exhibits no tenderness. Abdominal: Soft. Bowel sounds are normal. He exhibits no distension and no mass. There is no tenderness. There is no rebound and no guarding. Musculoskeletal: He exhibits tenderness. He exhibits no edema. Cervical back: He exhibits decreased range of motion and tenderness. Back:    Lymphadenopathy:     He has no cervical adenopathy. Neurological: He is alert and oriented to person, place, and time. Skin: Skin is warm. No rash noted. Psychiatric: Judgment and thought content normal. His mood appears anxious. His affect is blunt. His speech is delayed. He is slowed. Cognition and memory are normal. He exhibits a depressed mood. Nursing note and vitals reviewed. /77   Pulse 89   Ht 6' 3\" (1.905 m)   Wt 230 lb (104.3 kg)   SpO2 95%   BMI 28.75 kg/m²     Assessment:       Diagnosis Orders   1. Displacement of intervertebral disc at C4-C5 level  tiZANidine (ZANAFLEX) 4 MG tablet   2. Lumbosacral radiculopathy at L5     3. Intractable migraine with aura without status migrainosus     4. Lumbar sprain, sequela     5. Deep vein thrombosis of peroneal vein, right ([facility]     6. Major depressive disorder, single episode, moderate (HCC)     7. Arthritis of elbow, left     8. Postconcussion syndrome     9. Impotence of organic origin     10.  Bipolar affective disorder, currently depressed, mild ([address]     11. Sprain of right ankle, unspecified ligament, sequela     12. Diffuse myofascial pain syndrome     13. Contusion of right side of back, sequela     14. Urge incontinence of urine                   Plan:     The current medical regimen is effective;  continue present plan and medications. Add Zanaflex at bed time prn  Isometric cervical and lumbar exercise daily, arms/ and both legs strengthening daily  Pt is waiting for appeal result to be allowed to see Psychologist Dr Edison Melvin, then there is a debate if he would more benefit from fluoroscopic neck steroid injections by [facility] v cervical decompressive laminectomy  He describes more intense daily and nightly cervical pains and less intense lumbar pains  Otherwise the current medical regimen is effective;  continue present plan and medications. Return in about 2 months (around 8/14/2019), or (this is f/u visit for [facility]), for follow up. No orders of the defined types were placed in this encounter. Patient given educational materials - see patient instructions. Discussed use, benefit,and side effects of prescribed medications. All patient questions answered. Pt voiced understanding. Reviewed health maintenance. Instructed to continue current medications, diet and exercise. Patient agreed withtreatment plan. Follow up as directed.      Electronically signed by Troy Vang MD on 6/14/2019 at 11:29 AM

## 2019-07-10 ENCOUNTER — OFFICE VISIT (OUTPATIENT)
Dept: FAMILY MEDICINE CLINIC | Age: 59
End: 2019-07-10
Payer: COMMERCIAL

## 2019-07-10 VITALS
HEART RATE: 66 BPM | OXYGEN SATURATION: 86 % | SYSTOLIC BLOOD PRESSURE: 121 MMHG | WEIGHT: 230 LBS | BODY MASS INDEX: 28.75 KG/M2 | DIASTOLIC BLOOD PRESSURE: 79 MMHG

## 2019-07-10 DIAGNOSIS — N52.9 IMPOTENCE OF ORGANIC ORIGIN: ICD-10-CM

## 2019-07-10 DIAGNOSIS — M50.30 DEGENERATION OF CERVICAL INTERVERTEBRAL DISC: ICD-10-CM

## 2019-07-10 DIAGNOSIS — M19.022 ARTHRITIS OF ELBOW, LEFT: ICD-10-CM

## 2019-07-10 DIAGNOSIS — F32.1 MAJOR DEPRESSIVE DISORDER, SINGLE EPISODE, MODERATE (HCC): ICD-10-CM

## 2019-07-10 DIAGNOSIS — I82.451 DEEP VEIN THROMBOSIS OF PERONEAL VEIN, RIGHT (HCC): ICD-10-CM

## 2019-07-10 DIAGNOSIS — G43.119 INTRACTABLE MIGRAINE WITH AURA WITHOUT STATUS MIGRAINOSUS: ICD-10-CM

## 2019-07-10 DIAGNOSIS — S33.5XXS LUMBAR SPRAIN, SEQUELA: ICD-10-CM

## 2019-07-10 DIAGNOSIS — N39.41 URGE INCONTINENCE OF URINE: ICD-10-CM

## 2019-07-10 DIAGNOSIS — M50.221 DISPLACEMENT OF INTERVERTEBRAL DISC AT C4-C5 LEVEL: ICD-10-CM

## 2019-07-10 DIAGNOSIS — M79.18 DIFFUSE MYOFASCIAL PAIN SYNDROME: ICD-10-CM

## 2019-07-10 DIAGNOSIS — M54.17 LUMBOSACRAL RADICULOPATHY AT L5: Primary | ICD-10-CM

## 2019-07-10 DIAGNOSIS — F07.81 POSTCONCUSSION SYNDROME: ICD-10-CM

## 2019-07-10 DIAGNOSIS — F31.31 BIPOLAR AFFECTIVE DISORDER, CURRENTLY DEPRESSED, MILD (HCC): ICD-10-CM

## 2019-07-10 PROCEDURE — 99214 OFFICE O/P EST MOD 30 MIN: CPT | Performed by: FAMILY MEDICINE

## 2019-07-10 ASSESSMENT — ENCOUNTER SYMPTOMS
TROUBLE SWALLOWING: 0
ABDOMINAL PAIN: 0
VOMITING: 0
BACK PAIN: 1
DIARRHEA: 0
ANAL BLEEDING: 0
FACIAL SWELLING: 0
CONSTIPATION: 0
SINUS PRESSURE: 0
SHORTNESS OF BREATH: 0
EYE PAIN: 0
CHEST TIGHTNESS: 0
NAUSEA: 0
COLOR CHANGE: 0
APNEA: 0
WHEEZING: 0
ABDOMINAL DISTENTION: 0
SORE THROAT: 0
PHOTOPHOBIA: 0
EYE DISCHARGE: 0

## 2019-07-10 NOTE — PROGRESS NOTES
episode, moderate (HCC)     wc    Migraine with aura, with intractable migraine, so stated, without mention of status migrainosus     wc   nausea and vominting daily patient on zofran     Other disorders of bone and cartilage(733.99)     wc    PONV (postoperative nausea and vomiting)     Postconcussion syndrome     wc    Risk for falls     Self-catheterizes urinary bladder     Sprain of ankle, unspecified site     wc    Sprain of foot, unspecified site     wc    Sprain of lumbar region     wc    Unspecified urinary incontinence     wc      Past Surgical History:   Procedure Laterality Date    ACHILLES TENDON SURGERY Right 11/19/2015    lengthing    CERVICAL FUSION      C4-5    COLONOSCOPY      ELBOW SURGERY Left     ENDOSCOPY, COLON, DIAGNOSTIC      FOOT SURGERY Bilateral     FOOT SURGERY Right 03/30/2017    right achilles revision    FRACTURE SURGERY Left     elbow  fall three months ago       GASTRIC FUNDOPLICATION      LEG SURGERY Bilateral     DE DEEP DISSEC FOOT INFEC,1 BURSA Right 3/30/2017    RIGHT ACHILLES HYPERTROPHIC SCAR REVISION WITH EXCISION & ROTATIONAL FLAP  WITH PRP(CELLSAVER)  & CLARIX [place] GRAFT  performed by Guy Shahid MD at [facility]         History reviewed. No pertinent family history. Social History     Tobacco Use    Smoking status: Never Smoker    Smokeless tobacco: Never Used   Substance Use Topics    Alcohol use: No      Current Outpatient Medications   Medication Sig Dispense Refill    topiramate (TOPAMAX) 25 MG tablet take 1 tablet by mouth twice a day 60 tablet 3    tiZANidine (ZANAFLEX) 4 MG tablet take 1 tablet by mouth once daily 30 tablet 2    mometasone (ELOCON) 0.1 % cream Apply topically daily.  45 Tube 0    magnesium oxide (MAG-OX) 400 (240 Mg) MG tablet take 1 tablet by mouth once daily 30 tablet 3    fluconazole (DIFLUCAN) 200 MG tablet Take one tablet QOD for 5 days 14 tablet 0    montelukast (SINGULAIR) 10 MG tablet take 1

## 2019-08-07 ENCOUNTER — OFFICE VISIT (OUTPATIENT)
Dept: FAMILY MEDICINE CLINIC | Age: 59
End: 2019-08-07
Payer: COMMERCIAL

## 2019-08-07 VITALS
RESPIRATION RATE: 16 BRPM | HEART RATE: 69 BPM | HEIGHT: 75 IN | DIASTOLIC BLOOD PRESSURE: 80 MMHG | SYSTOLIC BLOOD PRESSURE: 116 MMHG | OXYGEN SATURATION: 94 % | BODY MASS INDEX: 28.35 KG/M2 | WEIGHT: 228 LBS

## 2019-08-07 DIAGNOSIS — M50.221 DISPLACEMENT OF INTERVERTEBRAL DISC AT C4-C5 LEVEL: Primary | ICD-10-CM

## 2019-08-07 DIAGNOSIS — N39.42 URINARY INCONTINENCE WITHOUT SENSORY AWARENESS: ICD-10-CM

## 2019-08-07 DIAGNOSIS — G56.22 ULNAR NERVE ENTRAPMENT AT ELBOW, LEFT: ICD-10-CM

## 2019-08-07 DIAGNOSIS — I82.451 DEEP VEIN THROMBOSIS OF PERONEAL VEIN, RIGHT (HCC): ICD-10-CM

## 2019-08-07 DIAGNOSIS — F07.81 POSTCONCUSSION SYNDROME: ICD-10-CM

## 2019-08-07 DIAGNOSIS — S33.5XXS LUMBAR SPRAIN, SEQUELA: ICD-10-CM

## 2019-08-07 DIAGNOSIS — N39.41 URGE INCONTINENCE OF URINE: ICD-10-CM

## 2019-08-07 DIAGNOSIS — F32.1 MAJOR DEPRESSIVE DISORDER, SINGLE EPISODE, MODERATE (HCC): ICD-10-CM

## 2019-08-07 DIAGNOSIS — S93.401S SPRAIN OF RIGHT ANKLE, UNSPECIFIED LIGAMENT, SEQUELA: ICD-10-CM

## 2019-08-07 DIAGNOSIS — M50.30 DEGENERATION OF CERVICAL INTERVERTEBRAL DISC: ICD-10-CM

## 2019-08-07 DIAGNOSIS — M19.022 ARTHRITIS OF ELBOW, LEFT: ICD-10-CM

## 2019-08-07 DIAGNOSIS — M84.872: ICD-10-CM

## 2019-08-07 DIAGNOSIS — F31.31 BIPOLAR AFFECTIVE DISORDER, CURRENTLY DEPRESSED, MILD (HCC): ICD-10-CM

## 2019-08-07 DIAGNOSIS — M54.17 LUMBOSACRAL RADICULOPATHY AT L5: ICD-10-CM

## 2019-08-07 DIAGNOSIS — R21 SKIN RASH: ICD-10-CM

## 2019-08-07 DIAGNOSIS — G43.119 INTRACTABLE MIGRAINE WITH AURA WITHOUT STATUS MIGRAINOSUS: ICD-10-CM

## 2019-08-07 DIAGNOSIS — N52.9 IMPOTENCE OF ORGANIC ORIGIN: ICD-10-CM

## 2019-08-07 DIAGNOSIS — M79.18 DIFFUSE MYOFASCIAL PAIN SYNDROME: ICD-10-CM

## 2019-08-07 DIAGNOSIS — S20.221S: ICD-10-CM

## 2019-08-07 PROCEDURE — 99214 OFFICE O/P EST MOD 30 MIN: CPT | Performed by: FAMILY MEDICINE

## 2019-08-07 ASSESSMENT — ENCOUNTER SYMPTOMS
APNEA: 0
ANAL BLEEDING: 0
TROUBLE SWALLOWING: 0
CONSTIPATION: 0
SINUS PRESSURE: 0
PHOTOPHOBIA: 0
SORE THROAT: 0
COLOR CHANGE: 0
FACIAL SWELLING: 0
VOMITING: 0
BACK PAIN: 1
ABDOMINAL PAIN: 0
NAUSEA: 0
EYE DISCHARGE: 0
SHORTNESS OF BREATH: 0
ABDOMINAL DISTENTION: 0
WHEEZING: 0
CHEST TIGHTNESS: 0
EYE PAIN: 0
DIARRHEA: 0

## 2019-08-07 NOTE — PROGRESS NOTES
Feet:    Lymphadenopathy:     He has no cervical adenopathy. Neurological: He is alert and oriented to person, place, and time. Skin: Skin is warm. No rash noted. Psychiatric: Judgment and thought content normal. His mood appears anxious. His affect is blunt and labile. His speech is delayed. He is slowed. Cognition and memory are normal. He exhibits a depressed mood. Nursing note and vitals reviewed. /80   Pulse 69   Resp 16   Ht 6' 3\" (1.905 m)   Wt 228 lb (103.4 kg)   SpO2 94%   BMI 28.50 kg/m²     Assessment:       Diagnosis Orders   1. Displacement of intervertebral disc at C4-C5 level     2. Ulnar nerve entrapment at elbow, left  diclofenac sodium 1 % GEL   3. Postconcussion syndrome     4. Impotence of organic origin     5. Bipolar affective disorder, currently depressed, mild ([address]     6. Diffuse myofascial pain syndrome     7. Deep vein thrombosis of peroneal vein, right ([address]     8. Urge incontinence of urine     9. Arthritis of elbow, left     10. Lumbosacral radiculopathy at L5     11. Intractable migraine with aura without status migrainosus     12. Lumbar sprain, sequela     13. Degeneration of cervical intervertebral disc     14. Sprain of right ankle, unspecified ligament, sequela     15. Contusion of right side of back, sequela     16. Urinary incontinence without sensory awareness     17. Skin rash     18. Other disorders of continuity of bone, left ankle and foot     19. Major depressive disorder, single episode, moderate (HCC)                   Plan:     The current medical regimen is effective;  continue present plan and medications. Add Voltaren 1% gel bid, primarily to left elbow and right ankle posteriorly   Fall precautions  Spinal stimulator implantation pending    Return in about 2 months (around 10/7/2019) for follow up. No orders of the defined types were placed in this encounter. Patient given educational materials - see patient instructions.   Discussed

## 2019-08-12 ENCOUNTER — OFFICE VISIT (OUTPATIENT)
Dept: FAMILY MEDICINE CLINIC | Age: 59
End: 2019-08-12
Payer: COMMERCIAL

## 2019-08-12 ENCOUNTER — NURSE TRIAGE (OUTPATIENT)
Dept: OTHER | Facility: CLINIC | Age: 59
End: 2019-08-12

## 2019-08-12 VITALS
DIASTOLIC BLOOD PRESSURE: 85 MMHG | BODY MASS INDEX: 28.34 KG/M2 | OXYGEN SATURATION: 97 % | HEART RATE: 72 BPM | HEIGHT: 75 IN | SYSTOLIC BLOOD PRESSURE: 141 MMHG | WEIGHT: 227.96 LBS

## 2019-08-12 DIAGNOSIS — S33.5XXS LUMBAR SPRAIN, SEQUELA: ICD-10-CM

## 2019-08-12 DIAGNOSIS — G56.22 ULNAR NERVE ENTRAPMENT AT ELBOW, LEFT: ICD-10-CM

## 2019-08-12 DIAGNOSIS — M19.022 ARTHRITIS OF ELBOW, LEFT: ICD-10-CM

## 2019-08-12 DIAGNOSIS — G43.119 INTRACTABLE MIGRAINE WITH AURA WITHOUT STATUS MIGRAINOSUS: ICD-10-CM

## 2019-08-12 DIAGNOSIS — S20.221S: ICD-10-CM

## 2019-08-12 DIAGNOSIS — M79.18 DIFFUSE MYOFASCIAL PAIN SYNDROME: Primary | ICD-10-CM

## 2019-08-12 DIAGNOSIS — N39.42 URINARY INCONTINENCE WITHOUT SENSORY AWARENESS: ICD-10-CM

## 2019-08-12 DIAGNOSIS — N39.41 URGE INCONTINENCE OF URINE: ICD-10-CM

## 2019-08-12 DIAGNOSIS — M54.17 LUMBOSACRAL RADICULOPATHY AT L5: ICD-10-CM

## 2019-08-12 DIAGNOSIS — F07.81 POSTCONCUSSION SYNDROME: ICD-10-CM

## 2019-08-12 DIAGNOSIS — I82.451 DEEP VEIN THROMBOSIS OF PERONEAL VEIN, RIGHT (HCC): ICD-10-CM

## 2019-08-12 DIAGNOSIS — M50.30 DEGENERATION OF CERVICAL INTERVERTEBRAL DISC: ICD-10-CM

## 2019-08-12 DIAGNOSIS — M50.221 DISPLACEMENT OF INTERVERTEBRAL DISC AT C4-C5 LEVEL: ICD-10-CM

## 2019-08-12 DIAGNOSIS — F31.31 BIPOLAR AFFECTIVE DISORDER, CURRENTLY DEPRESSED, MILD (HCC): ICD-10-CM

## 2019-08-12 DIAGNOSIS — S93.401S SPRAIN OF RIGHT ANKLE, UNSPECIFIED LIGAMENT, SEQUELA: ICD-10-CM

## 2019-08-12 DIAGNOSIS — N52.9 IMPOTENCE OF ORGANIC ORIGIN: ICD-10-CM

## 2019-08-12 PROCEDURE — 3017F COLORECTAL CA SCREEN DOC REV: CPT | Performed by: FAMILY MEDICINE

## 2019-08-12 PROCEDURE — G8419 CALC BMI OUT NRM PARAM NOF/U: HCPCS | Performed by: FAMILY MEDICINE

## 2019-08-12 PROCEDURE — 99214 OFFICE O/P EST MOD 30 MIN: CPT | Performed by: FAMILY MEDICINE

## 2019-08-12 PROCEDURE — 1036F TOBACCO NON-USER: CPT | Performed by: FAMILY MEDICINE

## 2019-08-12 PROCEDURE — G8428 CUR MEDS NOT DOCUMENT: HCPCS | Performed by: FAMILY MEDICINE

## 2019-08-12 ASSESSMENT — ENCOUNTER SYMPTOMS
SORE THROAT: 0
CHEST TIGHTNESS: 0
BACK PAIN: 0
ABDOMINAL PAIN: 0
TROUBLE SWALLOWING: 0
EYE PAIN: 0
COLOR CHANGE: 0
FACIAL SWELLING: 0
ANAL BLEEDING: 0
SINUS PRESSURE: 0
PHOTOPHOBIA: 0
NAUSEA: 0
EYE DISCHARGE: 0
DIARRHEA: 0
ABDOMINAL DISTENTION: 0
WHEEZING: 0
CONSTIPATION: 0
SHORTNESS OF BREATH: 0
VOMITING: 0
APNEA: 0

## 2019-08-12 NOTE — PROGRESS NOTES
Angel Palomares MD, PhD FAAFP  [facility]. [id number]      Tong Puga is a 60 y.o. male who presents today for his medical conditions/complaints as noted below. Tong Puga is c/o of   Chief Complaint   Patient presents with    Headache     numbness in mouth         HPI:     Headache    This is a recurrent (numbness in mouth and over entire face) problem. The current episode started in the past 7 days. The problem has been waxing and waning. Associated symptoms include numbness. Pertinent negatives include no abdominal pain, back pain, dizziness, eye pain, fever, nausea, neck pain, photophobia, sinus pressure, sore throat or vomiting.        No results found for: LABA1C          ( goal A1C is < 7)   No results found for: LABMICR  No results found for: LDLCHOLESTEROL, LDLCALC    (goal LDL is <100)   BUN (mg/dL)   Date Value   12/30/2015 13     BP Readings from Last 3 Encounters:   08/12/19 (!) 141/85   08/07/19 116/80   07/10/19 121/79          (goal 120/80)    Past Medical History:   Diagnosis Date    Anemia     Hyperplastic anemia yrs ago    Anesthesia complication     \"angry after surgery can pull things out\"    Contusion of back(922.31)     wc    DDD (degenerative disc disease)     H/O cardiac catheterization     no stent    Hx of blood clots     after last ankle surgery    Impotence of organic origin     wc    Leaky heart valve     \"slight\"    Major depressive disorder, single episode, moderate ([address]     wc    Migraine with aura, with intractable migraine, so stated, without mention of status migrainosus     wc   nausea and vominting daily patient on zofran     Other disorders of bone and cartilage(733.99)     wc    PONV (postoperative nausea and vomiting)     Postconcussion syndrome     wc    Risk for falls     Self-catheterizes urinary bladder     Sprain of ankle, unspecified site     wc    Sprain of foot, (REMERON) 45 MG tablet Take 45 mg by mouth nightly   0    baclofen (LIORESAL) 10 MG tablet Take 10 mg by mouth as needed   0    warfarin (COUMADIN) 5 MG tablet   0    cyclobenzaprine (FLEXERIL) 5 MG tablet Take 5 mg by mouth daily as needed   0    DEXILANT 60 MG CPDR capsule Take 60 mg by mouth daily   0    diazepam (VALIUM) 5 MG tablet Take 5 mg by mouth as needed noon  0    LYRICA 50 MG capsule Take 100 mg by mouth daily   0     No current facility-administered medications for this visit. Allergies   Allergen Reactions    Xarelto [Rivaroxaban] Shortness Of Breath    Asa [Aspirin] Other (See Comments)     intolerance    Iv Dye [Iodides]      Allergy to Car Cath Dye (from free text allergy report).  Penicillins     Sulfa Antibiotics Other (See Comments)       Health Maintenance   Topic Date Due    Hepatitis C screen  1960    A1C test (Diabetic or Prediabetic)  10/29/1969    HIV screen  10/29/1974    DTaP/Tdap/Td vaccine (1 - Tdap) 10/29/1978    Lipid screen  10/29/1999    Shingles Vaccine (1 of 2) 10/29/2009    Flu vaccine (1) 09/01/2019    Colon cancer screen colonoscopy  10/11/2028    Pneumococcal 0-64 years Vaccine  Aged Out       Subjective:      Review of Systems   Constitutional: Negative for fatigue, fever and unexpected weight change. HENT: Negative for congestion, ear discharge, facial swelling, sinus pressure, sore throat and trouble swallowing. Eyes: Negative for photophobia, pain and discharge. Respiratory: Negative for apnea, chest tightness, shortness of breath and wheezing. Cardiovascular: Negative for chest pain and palpitations. Gastrointestinal: Negative for abdominal distention, abdominal pain, anal bleeding, constipation, diarrhea, nausea and vomiting. Endocrine: Negative for cold intolerance, heat intolerance, polydipsia, polyphagia and polyuria. Genitourinary: Positive for difficulty urinating and frequency. Negative for flank pain and hematuria. reviewed. BP (!) 141/85   Pulse 72   Ht 6' 3\" (1.905 m)   Wt 227 lb 15.3 oz (103.4 kg)   SpO2 97%   BMI 28.49 kg/m²     Assessment:       Diagnosis Orders   1. Diffuse myofascial pain syndrome     2. Postconcussion syndrome     3. Impotence of organic origin     4. Bipolar affective disorder, currently depressed, mild ([address]     5. Deep vein thrombosis of peroneal vein, right (HCC)     6. Lumbar sprain, sequela     7. Intractable migraine with aura without status migrainosus     8. Lumbosacral radiculopathy at L5     9. Displacement of intervertebral disc at C4-C5 level     10. Arthritis of elbow, left     11. Urge incontinence of urine     12. Ulnar nerve entrapment at elbow, left     13. Degeneration of cervical intervertebral disc     14. Sprain of right ankle, unspecified ligament, sequela     15. Contusion of right side of back, sequela     16. Urinary incontinence without sensory awareness                   Plan:    Pt requests CT vs MRI head/brain  I agree since this is new symptom related to allowed diagnoses  To obtain test ASAP - ER  Otherwise he current medical regimen is effective;  continue present plan and medications. Pt refused d Asa 81 mg daily    Return in about 1 month (around 9/12/2019), or [facility] visit as per pt's request, for follow up. No orders of the defined types were placed in this encounter. Patient given educational materials - see patient instructions. Discussed use, benefit,and side effects of prescribed medications. All patient questions answered. Pt voiced understanding. Reviewed health maintenance. Instructed to continue current medications, diet and exercise. Patient agreed withtreatment plan. Follow up as directed. Electronically signed by Zaki Navas MD on 8/12/2019 at [address] [place] [address]    Have you changed or started any medications since your last visit including any over-the-counter medicines, vitamins, or herbal medicines?  no   Are you having any side effects from any of your medications? -  no  Have you stopped taking any of your medications? Is so, why? -  no    Have you seen any other physician or provider since your last visit? No  Have you had any other diagnostic tests since your last visit? No  Have you been seen in the emergency room and/or had an admission to a hospital since we last saw you? No  Have you had your routine dental cleaning in the past 6 months? no    Have you activated your [employer] account? If not, what are your barriers?  No:     Patient Care Team:  Mj Neal MD as PCP - General (Family Medicine)  Mj Neal MD as PCP - [facility]    Medical History Review  Past Medical, Family, and Social History reviewed and does not contribute to the patient presenting condition    Health Maintenance   Topic Date Due    Hepatitis C screen  1960    A1C test (Diabetic or Prediabetic)  10/29/1969    HIV screen  10/29/1974    DTaP/Tdap/Td vaccine (1 - Tdap) 10/29/1978    Lipid screen  10/29/1999    Shingles Vaccine (1 of 2) 10/29/2009    Flu vaccine (1) 09/01/2019    Colon cancer screen colonoscopy  10/11/2028    Pneumococcal 0-64 years Vaccine  Aged Out

## 2019-09-04 ENCOUNTER — OFFICE VISIT (OUTPATIENT)
Dept: FAMILY MEDICINE CLINIC | Age: 59
End: 2019-09-04
Payer: COMMERCIAL

## 2019-09-04 VITALS
OXYGEN SATURATION: 99 % | HEIGHT: 75 IN | SYSTOLIC BLOOD PRESSURE: 118 MMHG | BODY MASS INDEX: 28.23 KG/M2 | WEIGHT: 227 LBS | DIASTOLIC BLOOD PRESSURE: 78 MMHG | HEART RATE: 72 BPM

## 2019-09-04 DIAGNOSIS — N39.41 URGE INCONTINENCE OF URINE: ICD-10-CM

## 2019-09-04 DIAGNOSIS — M50.221 DISPLACEMENT OF INTERVERTEBRAL DISC AT C4-C5 LEVEL: Primary | ICD-10-CM

## 2019-09-04 DIAGNOSIS — S20.221S: ICD-10-CM

## 2019-09-04 DIAGNOSIS — F07.81 POSTCONCUSSION SYNDROME: ICD-10-CM

## 2019-09-04 DIAGNOSIS — I82.451 DEEP VEIN THROMBOSIS OF PERONEAL VEIN, RIGHT (HCC): ICD-10-CM

## 2019-09-04 DIAGNOSIS — G56.22 ULNAR NERVE ENTRAPMENT AT ELBOW, LEFT: ICD-10-CM

## 2019-09-04 DIAGNOSIS — N52.9 IMPOTENCE OF ORGANIC ORIGIN: ICD-10-CM

## 2019-09-04 DIAGNOSIS — S33.5XXS LUMBAR SPRAIN, SEQUELA: ICD-10-CM

## 2019-09-04 DIAGNOSIS — F31.31 BIPOLAR AFFECTIVE DISORDER, CURRENTLY DEPRESSED, MILD (HCC): ICD-10-CM

## 2019-09-04 DIAGNOSIS — M50.30 DEGENERATION OF CERVICAL INTERVERTEBRAL DISC: ICD-10-CM

## 2019-09-04 DIAGNOSIS — M19.022 ARTHRITIS OF ELBOW, LEFT: ICD-10-CM

## 2019-09-04 DIAGNOSIS — M54.17 LUMBOSACRAL RADICULOPATHY AT L5: ICD-10-CM

## 2019-09-04 DIAGNOSIS — S93.401S SPRAIN OF RIGHT ANKLE, UNSPECIFIED LIGAMENT, SEQUELA: ICD-10-CM

## 2019-09-04 DIAGNOSIS — G43.119 INTRACTABLE MIGRAINE WITH AURA WITHOUT STATUS MIGRAINOSUS: ICD-10-CM

## 2019-09-04 DIAGNOSIS — M79.18 DIFFUSE MYOFASCIAL PAIN SYNDROME: ICD-10-CM

## 2019-09-04 DIAGNOSIS — N39.42 URINARY INCONTINENCE WITHOUT SENSORY AWARENESS: ICD-10-CM

## 2019-09-04 PROCEDURE — 99214 OFFICE O/P EST MOD 30 MIN: CPT | Performed by: FAMILY MEDICINE

## 2019-09-04 ASSESSMENT — ENCOUNTER SYMPTOMS
PHOTOPHOBIA: 0
APNEA: 0
VOMITING: 0
TROUBLE SWALLOWING: 0
BACK PAIN: 1
FACIAL SWELLING: 0
EYE DISCHARGE: 0
ANAL BLEEDING: 0
NAUSEA: 0
ABDOMINAL DISTENTION: 0
WHEEZING: 0
ABDOMINAL PAIN: 0
SHORTNESS OF BREATH: 0
EYE PAIN: 0
COLOR CHANGE: 0
SORE THROAT: 0
DIARRHEA: 0
SINUS PRESSURE: 0
CHEST TIGHTNESS: 0
CONSTIPATION: 0

## 2019-09-12 ENCOUNTER — TELEPHONE (OUTPATIENT)
Dept: FAMILY MEDICINE CLINIC | Age: 59
End: 2019-09-12

## 2019-09-23 ENCOUNTER — TELEPHONE (OUTPATIENT)
Dept: FAMILY MEDICINE CLINIC | Age: 59
End: 2019-09-23

## 2019-09-23 RX ORDER — AZITHROMYCIN 500 MG/1
500 TABLET, FILM COATED ORAL DAILY
Qty: 5 TABLET | Refills: 0 | Status: SHIPPED | OUTPATIENT
Start: 2019-09-23 | End: 2019-09-28

## 2019-09-25 ENCOUNTER — TELEPHONE (OUTPATIENT)
Dept: FAMILY MEDICINE CLINIC | Age: 59
End: 2019-09-25

## 2019-10-02 ENCOUNTER — OFFICE VISIT (OUTPATIENT)
Dept: FAMILY MEDICINE CLINIC | Age: 59
End: 2019-10-02
Payer: COMMERCIAL

## 2019-10-02 VITALS
HEIGHT: 75 IN | SYSTOLIC BLOOD PRESSURE: 126 MMHG | DIASTOLIC BLOOD PRESSURE: 81 MMHG | OXYGEN SATURATION: 97 % | HEART RATE: 88 BPM | BODY MASS INDEX: 27.98 KG/M2 | WEIGHT: 225 LBS

## 2019-10-02 DIAGNOSIS — M50.221 DISPLACEMENT OF INTERVERTEBRAL DISC AT C4-C5 LEVEL: ICD-10-CM

## 2019-10-02 DIAGNOSIS — N39.42 URINARY INCONTINENCE WITHOUT SENSORY AWARENESS: ICD-10-CM

## 2019-10-02 DIAGNOSIS — S33.5XXS LUMBAR SPRAIN, SEQUELA: ICD-10-CM

## 2019-10-02 DIAGNOSIS — G56.22 ULNAR NERVE ENTRAPMENT AT ELBOW, LEFT: ICD-10-CM

## 2019-10-02 DIAGNOSIS — M54.17 LUMBOSACRAL RADICULOPATHY AT L5: ICD-10-CM

## 2019-10-02 DIAGNOSIS — M50.30 DEGENERATION OF CERVICAL INTERVERTEBRAL DISC: Primary | ICD-10-CM

## 2019-10-02 DIAGNOSIS — I82.451 DEEP VEIN THROMBOSIS OF PERONEAL VEIN, RIGHT (HCC): ICD-10-CM

## 2019-10-02 DIAGNOSIS — G43.119 INTRACTABLE MIGRAINE WITH AURA WITHOUT STATUS MIGRAINOSUS: ICD-10-CM

## 2019-10-02 DIAGNOSIS — M79.18 DIFFUSE MYOFASCIAL PAIN SYNDROME: ICD-10-CM

## 2019-10-02 DIAGNOSIS — M19.022 ARTHRITIS OF ELBOW, LEFT: ICD-10-CM

## 2019-10-02 DIAGNOSIS — S93.401S SPRAIN OF RIGHT ANKLE, UNSPECIFIED LIGAMENT, SEQUELA: ICD-10-CM

## 2019-10-02 DIAGNOSIS — N52.9 IMPOTENCE OF ORGANIC ORIGIN: ICD-10-CM

## 2019-10-02 DIAGNOSIS — F07.81 POSTCONCUSSION SYNDROME: ICD-10-CM

## 2019-10-02 DIAGNOSIS — S20.221S: ICD-10-CM

## 2019-10-02 DIAGNOSIS — F31.31 BIPOLAR AFFECTIVE DISORDER, CURRENTLY DEPRESSED, MILD (HCC): ICD-10-CM

## 2019-10-02 DIAGNOSIS — N39.41 URGE INCONTINENCE OF URINE: ICD-10-CM

## 2019-10-02 PROCEDURE — 99214 OFFICE O/P EST MOD 30 MIN: CPT | Performed by: FAMILY MEDICINE

## 2019-10-02 ASSESSMENT — ENCOUNTER SYMPTOMS
BACK PAIN: 1
NAUSEA: 0
EYE PAIN: 0
COLOR CHANGE: 0
PHOTOPHOBIA: 0
ABDOMINAL DISTENTION: 0
CONSTIPATION: 0
SINUS PRESSURE: 0
VISUAL CHANGE: 1
APNEA: 0
TROUBLE SWALLOWING: 1
ANAL BLEEDING: 0
WHEEZING: 1
VOMITING: 0
SORE THROAT: 0
ABDOMINAL PAIN: 0
DIARRHEA: 0
CHEST TIGHTNESS: 0
FACIAL SWELLING: 0
EYE DISCHARGE: 0
SHORTNESS OF BREATH: 0

## 2019-10-30 ENCOUNTER — OFFICE VISIT (OUTPATIENT)
Dept: FAMILY MEDICINE CLINIC | Age: 59
End: 2019-10-30
Payer: COMMERCIAL

## 2019-10-30 ENCOUNTER — NURSE ONLY (OUTPATIENT)
Dept: FAMILY MEDICINE CLINIC | Age: 59
End: 2019-10-30
Payer: MEDICARE

## 2019-10-30 VITALS
OXYGEN SATURATION: 98 % | BODY MASS INDEX: 28.23 KG/M2 | SYSTOLIC BLOOD PRESSURE: 126 MMHG | HEART RATE: 91 BPM | HEIGHT: 75 IN | WEIGHT: 227 LBS | DIASTOLIC BLOOD PRESSURE: 78 MMHG

## 2019-10-30 DIAGNOSIS — M50.30 DEGENERATION OF CERVICAL INTERVERTEBRAL DISC: Primary | ICD-10-CM

## 2019-10-30 DIAGNOSIS — M19.022 ARTHRITIS OF ELBOW, LEFT: ICD-10-CM

## 2019-10-30 DIAGNOSIS — N52.9 IMPOTENCE OF ORGANIC ORIGIN: ICD-10-CM

## 2019-10-30 DIAGNOSIS — G43.119 INTRACTABLE MIGRAINE WITH AURA WITHOUT STATUS MIGRAINOSUS: ICD-10-CM

## 2019-10-30 DIAGNOSIS — F07.81 POSTCONCUSSION SYNDROME: ICD-10-CM

## 2019-10-30 DIAGNOSIS — M79.18 DIFFUSE MYOFASCIAL PAIN SYNDROME: ICD-10-CM

## 2019-10-30 DIAGNOSIS — M54.17 LUMBOSACRAL RADICULOPATHY AT L5: ICD-10-CM

## 2019-10-30 DIAGNOSIS — N39.41 URGE INCONTINENCE OF URINE: ICD-10-CM

## 2019-10-30 DIAGNOSIS — M50.221 DISPLACEMENT OF INTERVERTEBRAL DISC AT C4-C5 LEVEL: ICD-10-CM

## 2019-10-30 DIAGNOSIS — G56.22 ULNAR NERVE ENTRAPMENT AT ELBOW, LEFT: ICD-10-CM

## 2019-10-30 DIAGNOSIS — S33.5XXS LUMBAR SPRAIN, SEQUELA: ICD-10-CM

## 2019-10-30 DIAGNOSIS — S93.401S SPRAIN OF RIGHT ANKLE, UNSPECIFIED LIGAMENT, SEQUELA: ICD-10-CM

## 2019-10-30 DIAGNOSIS — I82.451 DEEP VEIN THROMBOSIS OF PERONEAL VEIN, RIGHT (HCC): ICD-10-CM

## 2019-10-30 DIAGNOSIS — F31.31 BIPOLAR AFFECTIVE DISORDER, CURRENTLY DEPRESSED, MILD (HCC): ICD-10-CM

## 2019-10-30 DIAGNOSIS — Z23 NEED FOR INFLUENZA VACCINATION: Primary | ICD-10-CM

## 2019-10-30 DIAGNOSIS — S20.221S: ICD-10-CM

## 2019-10-30 PROCEDURE — 90686 IIV4 VACC NO PRSV 0.5 ML IM: CPT | Performed by: FAMILY MEDICINE

## 2019-10-30 PROCEDURE — 99214 OFFICE O/P EST MOD 30 MIN: CPT | Performed by: FAMILY MEDICINE

## 2019-10-30 PROCEDURE — G0008 ADMIN INFLUENZA VIRUS VAC: HCPCS | Performed by: FAMILY MEDICINE

## 2019-10-30 RX ORDER — PREDNISONE 10 MG/1
TABLET ORAL
Qty: 24 TABLET | Refills: 0 | Status: SHIPPED | OUTPATIENT
Start: 2019-10-30 | End: 2021-01-20

## 2019-10-30 RX ORDER — AZITHROMYCIN 500 MG/1
500 TABLET, FILM COATED ORAL DAILY
Qty: 5 TABLET | Refills: 0 | Status: SHIPPED | OUTPATIENT
Start: 2019-10-30 | End: 2019-11-04

## 2019-10-30 ASSESSMENT — ENCOUNTER SYMPTOMS
SINUS PRESSURE: 0
CONSTIPATION: 0
ABDOMINAL PAIN: 0
EYE PAIN: 0
BACK PAIN: 1
CHEST TIGHTNESS: 0
EYE DISCHARGE: 0
FACIAL SWELLING: 0
APNEA: 0
PHOTOPHOBIA: 0
ANAL BLEEDING: 0
VOMITING: 0
SORE THROAT: 0
COLOR CHANGE: 0
NAUSEA: 0
WHEEZING: 0
SHORTNESS OF BREATH: 0
TROUBLE SWALLOWING: 0
ABDOMINAL DISTENTION: 0
DIARRHEA: 0

## 2019-11-01 ENCOUNTER — TELEPHONE (OUTPATIENT)
Dept: FAMILY MEDICINE CLINIC | Age: 59
End: 2019-11-01

## 2019-11-04 ENCOUNTER — OFFICE VISIT (OUTPATIENT)
Dept: FAMILY MEDICINE CLINIC | Age: 59
End: 2019-11-04
Payer: COMMERCIAL

## 2019-11-04 ENCOUNTER — HOSPITAL ENCOUNTER (OUTPATIENT)
Age: 59
Setting detail: SPECIMEN
Discharge: HOME | End: 2019-11-04
Payer: MEDICARE

## 2019-11-04 VITALS
HEIGHT: 75 IN | SYSTOLIC BLOOD PRESSURE: 114 MMHG | BODY MASS INDEX: 27.58 KG/M2 | WEIGHT: 221.8 LBS | OXYGEN SATURATION: 97 % | HEART RATE: 78 BPM | DIASTOLIC BLOOD PRESSURE: 75 MMHG | RESPIRATION RATE: 16 BRPM

## 2019-11-04 DIAGNOSIS — I82.451 DEEP VEIN THROMBOSIS OF PERONEAL VEIN, RIGHT (HCC): ICD-10-CM

## 2019-11-04 DIAGNOSIS — M50.30 DEGENERATION OF CERVICAL INTERVERTEBRAL DISC: Primary | ICD-10-CM

## 2019-11-04 DIAGNOSIS — I26.02 ACUTE SADDLE PULMONARY EMBOLISM WITH ACUTE COR PULMONALE (HCC): ICD-10-CM

## 2019-11-04 LAB — D-DIMER QUANTITATIVE: 0.44 MG/L FEU

## 2019-11-04 PROCEDURE — 99214 OFFICE O/P EST MOD 30 MIN: CPT | Performed by: FAMILY MEDICINE

## 2019-11-04 ASSESSMENT — ENCOUNTER SYMPTOMS
CHEST TIGHTNESS: 0
WHEEZING: 1
SORE THROAT: 0
COLOR CHANGE: 0
ABDOMINAL PAIN: 0
TROUBLE SWALLOWING: 0
BACK PAIN: 0
CONSTIPATION: 0
EYE DISCHARGE: 0
SHORTNESS OF BREATH: 1
ABDOMINAL DISTENTION: 0
SINUS PRESSURE: 0
EYE PAIN: 0
FACIAL SWELLING: 0
ANAL BLEEDING: 0
COUGH: 1
APNEA: 0
NAUSEA: 0
PHOTOPHOBIA: 0
VOMITING: 0
DIARRHEA: 0

## 2019-11-21 DIAGNOSIS — F51.01 PRIMARY INSOMNIA: Primary | ICD-10-CM

## 2019-11-21 DIAGNOSIS — F32.1 MAJOR DEPRESSIVE DISORDER, SINGLE EPISODE, MODERATE (HCC): ICD-10-CM

## 2019-11-21 DIAGNOSIS — M54.17 LUMBOSACRAL RADICULOPATHY AT L5: ICD-10-CM

## 2019-11-21 RX ORDER — TRAZODONE HYDROCHLORIDE 50 MG/1
50 TABLET ORAL NIGHTLY PRN
Qty: 15 TABLET | Refills: 0 | Status: SHIPPED | OUTPATIENT
Start: 2019-11-21 | End: 2019-12-09 | Stop reason: SDUPTHER

## 2019-11-26 ENCOUNTER — OFFICE VISIT (OUTPATIENT)
Dept: FAMILY MEDICINE CLINIC | Age: 59
End: 2019-11-26
Payer: COMMERCIAL

## 2019-11-26 VITALS
HEART RATE: 76 BPM | WEIGHT: 222 LBS | SYSTOLIC BLOOD PRESSURE: 121 MMHG | DIASTOLIC BLOOD PRESSURE: 73 MMHG | OXYGEN SATURATION: 94 % | BODY MASS INDEX: 27.6 KG/M2 | HEIGHT: 75 IN

## 2019-11-26 DIAGNOSIS — M54.17 LUMBOSACRAL RADICULOPATHY AT L5: ICD-10-CM

## 2019-11-26 DIAGNOSIS — M50.221 DISPLACEMENT OF INTERVERTEBRAL DISC AT C4-C5 LEVEL: Primary | ICD-10-CM

## 2019-11-26 DIAGNOSIS — S33.5XXS LUMBAR SPRAIN, SEQUELA: ICD-10-CM

## 2019-11-26 DIAGNOSIS — F07.81 POSTCONCUSSION SYNDROME: ICD-10-CM

## 2019-11-26 DIAGNOSIS — M50.30 DEGENERATION OF CERVICAL INTERVERTEBRAL DISC: ICD-10-CM

## 2019-11-26 DIAGNOSIS — M79.18 DIFFUSE MYOFASCIAL PAIN SYNDROME: ICD-10-CM

## 2019-11-26 DIAGNOSIS — I82.451 DEEP VEIN THROMBOSIS OF PERONEAL VEIN, RIGHT (HCC): ICD-10-CM

## 2019-11-26 DIAGNOSIS — G56.22 CUBITAL TUNNEL SYNDROME ON LEFT: ICD-10-CM

## 2019-11-26 PROCEDURE — 3017F COLORECTAL CA SCREEN DOC REV: CPT | Performed by: FAMILY MEDICINE

## 2019-11-26 PROCEDURE — 1036F TOBACCO NON-USER: CPT | Performed by: FAMILY MEDICINE

## 2019-11-26 PROCEDURE — G8419 CALC BMI OUT NRM PARAM NOF/U: HCPCS | Performed by: FAMILY MEDICINE

## 2019-11-26 PROCEDURE — G8482 FLU IMMUNIZE ORDER/ADMIN: HCPCS | Performed by: FAMILY MEDICINE

## 2019-11-26 PROCEDURE — 99214 OFFICE O/P EST MOD 30 MIN: CPT | Performed by: FAMILY MEDICINE

## 2019-11-26 PROCEDURE — G8428 CUR MEDS NOT DOCUMENT: HCPCS | Performed by: FAMILY MEDICINE

## 2019-11-26 RX ORDER — IBUPROFEN 800 MG/1
800 TABLET ORAL DAILY
Qty: 30 TABLET | Refills: 0 | Status: SHIPPED | OUTPATIENT
Start: 2019-11-26 | End: 2020-05-29 | Stop reason: SDUPTHER

## 2019-11-26 RX ORDER — IBUPROFEN 800 MG/1
TABLET ORAL
Refills: 0 | COMMUNITY
Start: 2019-11-19 | End: 2019-11-26 | Stop reason: SDUPTHER

## 2019-11-26 ASSESSMENT — ENCOUNTER SYMPTOMS
TROUBLE SWALLOWING: 0
EYE PAIN: 0
BACK PAIN: 1
PHOTOPHOBIA: 0
SHORTNESS OF BREATH: 0
APNEA: 0
FACIAL SWELLING: 0
EYE DISCHARGE: 0
SINUS PRESSURE: 0
ABDOMINAL DISTENTION: 0
CONSTIPATION: 0
WHEEZING: 0
VOMITING: 0
ABDOMINAL PAIN: 0
CHEST TIGHTNESS: 0
DIARRHEA: 0
ANAL BLEEDING: 0
NAUSEA: 0
COLOR CHANGE: 0
SORE THROAT: 0

## 2019-11-27 RX ORDER — LEVOFLOXACIN 750 MG/1
750 TABLET ORAL DAILY
Qty: 7 TABLET | Refills: 0 | Status: SHIPPED | OUTPATIENT
Start: 2019-11-27 | End: 2019-12-04

## 2019-12-04 ENCOUNTER — TELEPHONE (OUTPATIENT)
Dept: FAMILY MEDICINE CLINIC | Age: 59
End: 2019-12-04

## 2019-12-04 DIAGNOSIS — G60.8: ICD-10-CM

## 2019-12-04 DIAGNOSIS — M50.221 DISPLACEMENT OF INTERVERTEBRAL DISC AT C4-C5 LEVEL: Primary | ICD-10-CM

## 2019-12-04 DIAGNOSIS — M79.18 DIFFUSE MYOFASCIAL PAIN SYNDROME: ICD-10-CM

## 2019-12-04 NOTE — TELEPHONE ENCOUNTER
Pt states [employer] needs a med script including diagnosis for motor wheel chair faxed to [phone]

## 2019-12-05 NOTE — TELEPHONE ENCOUNTER
[facility]  Fax # [phone]

## 2019-12-09 DIAGNOSIS — F51.01 PRIMARY INSOMNIA: ICD-10-CM

## 2019-12-09 RX ORDER — TRAZODONE HYDROCHLORIDE 50 MG/1
TABLET ORAL
Qty: 15 TABLET | Refills: 0 | Status: SHIPPED | OUTPATIENT
Start: 2019-12-09 | End: 2020-04-20 | Stop reason: SDUPTHER

## 2019-12-10 ENCOUNTER — TELEPHONE (OUTPATIENT)
Dept: FAMILY MEDICINE CLINIC | Age: 59
End: 2019-12-10

## 2019-12-12 ENCOUNTER — OFFICE VISIT (OUTPATIENT)
Dept: FAMILY MEDICINE CLINIC | Age: 59
End: 2019-12-12
Payer: MEDICARE

## 2019-12-12 VITALS
WEIGHT: 224 LBS | SYSTOLIC BLOOD PRESSURE: 118 MMHG | HEART RATE: 81 BPM | DIASTOLIC BLOOD PRESSURE: 78 MMHG | HEIGHT: 75 IN | OXYGEN SATURATION: 98 % | BODY MASS INDEX: 27.85 KG/M2

## 2019-12-12 DIAGNOSIS — R73.01 IMPAIRED FASTING GLUCOSE: ICD-10-CM

## 2019-12-12 DIAGNOSIS — R05.3 PERSISTENT COUGH: Primary | ICD-10-CM

## 2019-12-12 DIAGNOSIS — J45.909 ACUTE ASTHMATIC BRONCHITIS: ICD-10-CM

## 2019-12-12 DIAGNOSIS — F51.01 PRIMARY INSOMNIA: ICD-10-CM

## 2019-12-12 PROCEDURE — 1036F TOBACCO NON-USER: CPT | Performed by: FAMILY MEDICINE

## 2019-12-12 PROCEDURE — 3017F COLORECTAL CA SCREEN DOC REV: CPT | Performed by: FAMILY MEDICINE

## 2019-12-12 PROCEDURE — G8482 FLU IMMUNIZE ORDER/ADMIN: HCPCS | Performed by: FAMILY MEDICINE

## 2019-12-12 PROCEDURE — G8419 CALC BMI OUT NRM PARAM NOF/U: HCPCS | Performed by: FAMILY MEDICINE

## 2019-12-12 PROCEDURE — G8427 DOCREV CUR MEDS BY ELIG CLIN: HCPCS | Performed by: FAMILY MEDICINE

## 2019-12-12 PROCEDURE — 99213 OFFICE O/P EST LOW 20 MIN: CPT | Performed by: FAMILY MEDICINE

## 2019-12-12 RX ORDER — PREDNISONE 10 MG/1
TABLET ORAL
Qty: 10 TABLET | Refills: 0 | Status: SHIPPED | OUTPATIENT
Start: 2019-12-12 | End: 2021-01-20

## 2019-12-12 RX ORDER — AZITHROMYCIN 500 MG/1
500 TABLET, FILM COATED ORAL DAILY
Qty: 5 TABLET | Refills: 0 | Status: SHIPPED | OUTPATIENT
Start: 2019-12-12 | End: 2020-03-30 | Stop reason: SDUPTHER

## 2019-12-12 ASSESSMENT — ENCOUNTER SYMPTOMS
SORE THROAT: 1
RHINORRHEA: 1
WHEEZING: 1
BACK PAIN: 1
COLOR CHANGE: 0
ABDOMINAL DISTENTION: 0
APNEA: 0
ANAL BLEEDING: 0
SHORTNESS OF BREATH: 1
DIARRHEA: 0
PHOTOPHOBIA: 0
ABDOMINAL PAIN: 0
SINUS PRESSURE: 1
EYE PAIN: 0
COUGH: 1
TROUBLE SWALLOWING: 0
VOMITING: 0
FACIAL SWELLING: 0
EYE DISCHARGE: 0
NAUSEA: 0
CONSTIPATION: 0
CHEST TIGHTNESS: 0

## 2019-12-13 ENCOUNTER — TELEPHONE (OUTPATIENT)
Dept: FAMILY MEDICINE CLINIC | Age: 59
End: 2019-12-13

## 2019-12-18 ENCOUNTER — TELEPHONE (OUTPATIENT)
Dept: FAMILY MEDICINE CLINIC | Age: 59
End: 2019-12-18

## 2019-12-18 DIAGNOSIS — J45.909 ACUTE ASTHMATIC BRONCHITIS: ICD-10-CM

## 2019-12-18 DIAGNOSIS — R05.9 COUGH: Primary | ICD-10-CM

## 2019-12-18 DIAGNOSIS — R06.2 WHEEZING: ICD-10-CM

## 2019-12-19 RX ORDER — LEVOFLOXACIN 750 MG/1
750 TABLET ORAL DAILY
Qty: 7 TABLET | Refills: 0 | Status: SHIPPED | OUTPATIENT
Start: 2019-12-19 | End: 2019-12-26

## 2019-12-23 ENCOUNTER — OFFICE VISIT (OUTPATIENT)
Dept: FAMILY MEDICINE CLINIC | Age: 59
End: 2019-12-23
Payer: COMMERCIAL

## 2019-12-23 VITALS
HEART RATE: 76 BPM | DIASTOLIC BLOOD PRESSURE: 74 MMHG | WEIGHT: 216 LBS | BODY MASS INDEX: 26.86 KG/M2 | SYSTOLIC BLOOD PRESSURE: 130 MMHG | HEIGHT: 75 IN

## 2019-12-23 DIAGNOSIS — M54.17 LUMBOSACRAL RADICULOPATHY AT L5: ICD-10-CM

## 2019-12-23 DIAGNOSIS — M79.18 DIFFUSE MYOFASCIAL PAIN SYNDROME: ICD-10-CM

## 2019-12-23 DIAGNOSIS — F07.81 POSTCONCUSSION SYNDROME: ICD-10-CM

## 2019-12-23 DIAGNOSIS — I82.451 DEEP VEIN THROMBOSIS OF PERONEAL VEIN, RIGHT (HCC): ICD-10-CM

## 2019-12-23 DIAGNOSIS — M19.022 ARTHRITIS OF ELBOW, LEFT: ICD-10-CM

## 2019-12-23 DIAGNOSIS — M50.30 DEGENERATION OF CERVICAL INTERVERTEBRAL DISC: Primary | ICD-10-CM

## 2019-12-23 DIAGNOSIS — G43.119 INTRACTABLE MIGRAINE WITH AURA WITHOUT STATUS MIGRAINOSUS: ICD-10-CM

## 2019-12-23 DIAGNOSIS — N39.3 STRESS INCONTINENCE OF URINE: ICD-10-CM

## 2019-12-23 PROCEDURE — 99214 OFFICE O/P EST MOD 30 MIN: CPT | Performed by: FAMILY MEDICINE

## 2019-12-23 RX ORDER — PREDNISONE 10 MG/1
10 TABLET ORAL DAILY
Qty: 10 TABLET | Refills: 0 | Status: SHIPPED | OUTPATIENT
Start: 2019-12-23 | End: 2020-01-02

## 2019-12-23 ASSESSMENT — ENCOUNTER SYMPTOMS
NAUSEA: 0
CHEST TIGHTNESS: 0
APNEA: 0
DIARRHEA: 0
CONSTIPATION: 0
WHEEZING: 1
PHOTOPHOBIA: 0
COLOR CHANGE: 0
SORE THROAT: 0
ABDOMINAL PAIN: 0
SINUS PRESSURE: 0
TROUBLE SWALLOWING: 0
BACK PAIN: 1
EYE PAIN: 0
VOMITING: 0
FACIAL SWELLING: 0
ABDOMINAL DISTENTION: 0
SHORTNESS OF BREATH: 0
EYE DISCHARGE: 0
ANAL BLEEDING: 0

## 2019-12-24 DIAGNOSIS — G47.00 INSOMNIA, UNSPECIFIED TYPE: Primary | ICD-10-CM

## 2019-12-27 RX ORDER — TRAZODONE HYDROCHLORIDE 50 MG/1
50 TABLET ORAL NIGHTLY
Qty: 15 TABLET | Refills: 0 | Status: SHIPPED | OUTPATIENT
Start: 2019-12-27 | End: 2020-01-23 | Stop reason: SDUPTHER

## 2020-01-20 RX ORDER — TIZANIDINE 4 MG/1
4 TABLET ORAL NIGHTLY
Qty: 30 TABLET | Refills: 0 | Status: SHIPPED | OUTPATIENT
Start: 2020-01-20 | End: 2020-02-19

## 2020-01-23 RX ORDER — TRAZODONE HYDROCHLORIDE 50 MG/1
50 TABLET ORAL NIGHTLY
Qty: 15 TABLET | Refills: 0 | Status: SHIPPED | OUTPATIENT
Start: 2020-01-23 | End: 2020-02-10 | Stop reason: SDUPTHER

## 2020-01-28 ENCOUNTER — OFFICE VISIT (OUTPATIENT)
Dept: PULMONOLOGY | Age: 60
End: 2020-01-28
Payer: COMMERCIAL

## 2020-01-28 VITALS
OXYGEN SATURATION: 98 % | SYSTOLIC BLOOD PRESSURE: 114 MMHG | HEIGHT: 75 IN | BODY MASS INDEX: 27.73 KG/M2 | HEART RATE: 74 BPM | WEIGHT: 223 LBS | DIASTOLIC BLOOD PRESSURE: 71 MMHG | RESPIRATION RATE: 14 BRPM

## 2020-01-28 PROCEDURE — G8419 CALC BMI OUT NRM PARAM NOF/U: HCPCS | Performed by: INTERNAL MEDICINE

## 2020-01-28 PROCEDURE — 3017F COLORECTAL CA SCREEN DOC REV: CPT | Performed by: INTERNAL MEDICINE

## 2020-01-28 PROCEDURE — 94010 BREATHING CAPACITY TEST: CPT | Performed by: INTERNAL MEDICINE

## 2020-01-28 PROCEDURE — 99203 OFFICE O/P NEW LOW 30 MIN: CPT | Performed by: INTERNAL MEDICINE

## 2020-01-28 PROCEDURE — 94729 DIFFUSING CAPACITY: CPT | Performed by: INTERNAL MEDICINE

## 2020-01-28 PROCEDURE — G8427 DOCREV CUR MEDS BY ELIG CLIN: HCPCS | Performed by: INTERNAL MEDICINE

## 2020-01-28 PROCEDURE — G8482 FLU IMMUNIZE ORDER/ADMIN: HCPCS | Performed by: INTERNAL MEDICINE

## 2020-01-28 PROCEDURE — 1036F TOBACCO NON-USER: CPT | Performed by: INTERNAL MEDICINE

## 2020-01-28 ASSESSMENT — ENCOUNTER SYMPTOMS
BACK PAIN: 1
COUGH: 1
EYES NEGATIVE: 1
CHOKING: 1

## 2020-01-29 ENCOUNTER — OFFICE VISIT (OUTPATIENT)
Dept: FAMILY MEDICINE CLINIC | Age: 60
End: 2020-01-29
Payer: COMMERCIAL

## 2020-01-29 VITALS
SYSTOLIC BLOOD PRESSURE: 129 MMHG | WEIGHT: 223 LBS | OXYGEN SATURATION: 96 % | DIASTOLIC BLOOD PRESSURE: 86 MMHG | HEIGHT: 75 IN | BODY MASS INDEX: 27.73 KG/M2 | HEART RATE: 79 BPM

## 2020-01-29 PROCEDURE — 99214 OFFICE O/P EST MOD 30 MIN: CPT | Performed by: FAMILY MEDICINE

## 2020-01-29 RX ORDER — SILDENAFIL 50 MG/1
100 TABLET, FILM COATED ORAL DAILY PRN
Qty: 60 TABLET | Refills: 0 | Status: SHIPPED | OUTPATIENT
Start: 2020-01-29 | End: 2020-06-22 | Stop reason: SDUPTHER

## 2020-01-29 ASSESSMENT — ENCOUNTER SYMPTOMS
VOMITING: 0
APNEA: 0
SINUS PRESSURE: 0
WHEEZING: 0
TROUBLE SWALLOWING: 1
EYE DISCHARGE: 0
BACK PAIN: 1
ABDOMINAL PAIN: 0
ANAL BLEEDING: 0
COLOR CHANGE: 0
CHEST TIGHTNESS: 0
EYE PAIN: 0
SHORTNESS OF BREATH: 0
CONSTIPATION: 0
PHOTOPHOBIA: 0
DIARRHEA: 0
NAUSEA: 0
SORE THROAT: 0
ABDOMINAL DISTENTION: 0
FACIAL SWELLING: 0

## 2020-01-29 NOTE — PROGRESS NOTES
Lizzie Juan MD, PhD [facility]. [id number]      Nida Shahid is a 60 y.o. male who presents today for his medical conditions/complaints as noted below. Nida Shahid is c/o of   Chief Complaint   Patient presents with    Back Pain     [facility]         HPI:     Back Pain   This is a chronic problem. The current episode started more than 1 year ago. The problem has been waxing and waning since onset. The pain is present in the lumbar spine. The pain radiates to the left thigh. The pain is at a severity of 4/10. The pain is moderate. The pain is worse during the day. The symptoms are aggravated by bending, position, lying down, standing and twisting. Stiffness is present in the morning. Pertinent negatives include no abdominal pain, chest pain or fever.        No results found for: LABA1C          ( goal A1C is < 7)   No results found for: LABMICR  No results found for: LDLCHOLESTEROL, LDLCALC    (goal LDL is <100)   BUN (mg/dL)   Date Value   12/30/2015 13     BP Readings from Last 3 Encounters:   01/29/20 129/86   01/28/20 114/71   12/23/19 130/74          (goal 120/80)    Past Medical History:   Diagnosis Date    Acute asthmatic bronchitis     Anemia     Hyperplastic anemia yrs ago    Anesthesia complication     \"angry after surgery can pull things out\"    Congestion and hemorrhage of prostate     Contusion of back(922.31)     wc    DDD (degenerative disc disease)     H/O cardiac catheterization     no stent    Hx of blood clots     after last ankle surgery    Impaired fasting glucose     Impotence of organic origin     wc    Leaky heart valve     \"slight\"    Major depressive disorder, single episode, moderate (HCC)     wc    Migraine with aura, with intractable migraine, so stated, without mention of status migrainosus     wc   nausea and vominting daily patient on zofran     Other disorders of bone and cartilage(733.99)     wc    Persistent cough     finished both antibiotics    PONV (postoperative nausea and vomiting)     Postconcussion syndrome     wc    Primary insomnia     Risk for falls     Self-catheterizes urinary bladder     Shortness of breath     Sprain of ankle, unspecified site     wc    Sprain of foot, unspecified site     wc    Sprain of lumbar region     wc    Unspecified urinary incontinence     wc      Past Surgical History:   Procedure Laterality Date    ACHILLES TENDON SURGERY Right 11/19/2015    lengthing    CERVICAL FUSION      C4-5    COLONOSCOPY      ELBOW SURGERY Left     ENDOSCOPY, COLON, DIAGNOSTIC      FOOT SURGERY Bilateral     FOOT SURGERY Right 03/30/2017    right achilles revision    FRACTURE SURGERY Left     elbow  fall three months ago       GASTRIC FUNDOPLICATION      LEG SURGERY Bilateral     NJ DEEP DISSEC FOOT INFEC,1 BURSA Right 3/30/2017    RIGHT ACHILLES HYPERTROPHIC SCAR REVISION WITH EXCISION & ROTATIONAL FLAP  WITH PRP(CELLSAVER)  & CLARIX Livan Arthur GRAFT  performed by Kulwant Early MD at [facility]         No family history on file.     Social History     Tobacco Use    Smoking status: Never Smoker    Smokeless tobacco: Never Used   Substance Use Topics    Alcohol use: No      Current Outpatient Medications   Medication Sig Dispense Refill    sildenafil (VIAGRA) 50 MG tablet Take 2 tablets by mouth daily as needed for Erectile Dysfunction 60 tablet 0    tiZANidine (ZANAFLEX) 4 MG tablet Take 1 tablet by mouth nightly 30 tablet 0    traZODone (DESYREL) 50 MG tablet take 1 tablet by mouth every evening if needed for sleep 15 tablet 0    topiramate (TOPAMAX) 25 MG tablet take 1 tablet by mouth twice a day 60 tablet 3    magnesium oxide (MAG-OX) 400 (240 Mg) MG tablet take 1 tablet by mouth once daily 30 tablet 3    diclofenac sodium 1 % GEL Apply 2 g topically 4 times daily 6 Tube 1    mometasone (ELOCON) 0.1 % cream Apply topically daily. 45 Tube 0    montelukast (SINGULAIR) 10 MG tablet take 1 tablet by mouth once daily 30 tablet 0    promethazine (PHENERGAN) 25 MG tablet Take 25 mg by mouth every 8 hours as needed for Nausea      VIAGRA 50 MG tablet   1    butalbital-acetaminophen-caffeine (FIORICET, ESGIC) -40 MG per tablet   0    mirtazapine (REMERON) 45 MG tablet Take 45 mg by mouth nightly   0    baclofen (LIORESAL) 10 MG tablet Take 10 mg by mouth as needed   0    warfarin (COUMADIN) 5 MG tablet   0    cyclobenzaprine (FLEXERIL) 5 MG tablet Take 5 mg by mouth daily as needed   0    DEXILANT 60 MG CPDR capsule Take 60 mg by mouth daily   0    diazepam (VALIUM) 5 MG tablet Take 5 mg by mouth as needed noon  0    LYRICA 50 MG capsule Take 100 mg by mouth daily   0    traZODone (DESYREL) 50 MG tablet Take 1 tablet by mouth nightly for 15 days 15 tablet 0    predniSONE (DELTASONE) 10 MG tablet Take 10 mg for 10 days (Patient not taking: Reported on 1/29/2020) 10 tablet 0    Misc. Devices MISC 1 each by Does not apply route once for 1 dose 1 Device 0    ibuprofen (ADVIL;MOTRIN) 800 MG tablet Take 1 tablet by mouth daily 30 tablet 0    predniSONE (DELTASONE) 10 MG tablet Take 30 mg for 4 days, 20 mg for 4 days, 10 mg for 4 days (Patient not taking: Reported on 1/29/2020) 24 tablet 0    Misc. Devices MISC 1 each by Does not apply route once for 1 dose 1 Device 0    fluconazole (DIFLUCAN) 200 MG tablet Take one tablet QOD for 5 days (Patient not taking: Reported on 1/29/2020) 14 tablet 0    AFLURIA PRESERVATIVE FREE 0.5 ML JANA injection inject 0.5 milliliter intramuscularly  0     No current facility-administered medications for this visit. Allergies   Allergen Reactions    Xarelto [Rivaroxaban] Shortness Of Breath    Asa [Aspirin] Other (See Comments)     intolerance    Iv Dye [Iodides]      Allergy to Car Cath Dye (from free text allergy report).     Penicillins     Sulfa Antibiotics Other (See days after surgery showed fluid aspiration  Suggested dysphagia III diet with honey thickened fluids and speech therapy  He may benefit from repeat fluoroscopic swallow studies to assess dynamic of progression v regression    Plan:     The current medical regimen is effective;  continue present plan and medications. F/U Dr Adilene Singh [facility]  Rx Viagra prn  Fall precautions/mobility device cane  Return in about 2 months (around 3/29/2020) for follow up. No orders of the defined types were placed in this encounter. Patient given educational materials - see patient instructions. Discussed use, benefit,and side effects of prescribed medications. All patient questions answered. Pt voiced understanding. Reviewed health maintenance. Instructed to continue current medications, diet and exercise. Patient agreed withtreatment plan. Follow up as directed.      Electronically signed by Awilda Jenkins MD on 1/29/2020 at 2:56 PM
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
## 2020-01-30 ENCOUNTER — HOSPITAL ENCOUNTER (OUTPATIENT)
Dept: GENERAL RADIOLOGY | Age: 60
Discharge: HOME | End: 2020-02-01
Payer: MEDICARE

## 2020-01-30 PROCEDURE — 74230 X-RAY XM SWLNG FUNCJ C+: CPT

## 2020-01-30 PROCEDURE — 92611 MOTION FLUOROSCOPY/SWALLOW: CPT

## 2020-01-30 NOTE — PROCEDURES
INSTRUMENTAL SWALLOW REPORT  MODIFIED BARIUM SWALLOW    NAME: Pili Dunn   : 1960  MRN: [id number]       Date of Eval: 2020        Radiologist: Dr. Gerardo Burden        Past Medical History:  has a past medical history of Acute asthmatic bronchitis, Anemia, Anesthesia complication, Congestion and hemorrhage of prostate, Contusion of back(922.31), DDD (degenerative disc disease), H/O cardiac catheterization, Hx of blood clots, Impaired fasting glucose, Impotence of organic origin, Leaky heart valve, Major depressive disorder, single episode, moderate ([address] Migraine with aura, with intractable migraine, so stated, without mention of status migrainosus, Other disorders of bone and cartilage(733.99), Persistent cough, PONV (postoperative nausea and vomiting), Postconcussion syndrome, Primary insomnia, Risk for falls, Self-catheterizes urinary bladder, Shortness of breath, Sprain of ankle, unspecified site, Sprain of foot, unspecified site, Sprain of lumbar region, and Unspecified urinary incontinence. Past Surgical History:  has a past surgical history that includes Gastric fundoplication; Elbow surgery (Left); Leg Surgery (Bilateral); cervical fusion; Foot surgery (Bilateral); Colonoscopy; Endoscopy, colon, diagnostic; skin biopsy; fracture surgery (Left); Achilles tendon surgery (Right, 2015); Foot surgery (Right, 2017); and pr deep dissec foot infec,1 bursa (Right, 3/30/2017). Current Diet Solid Consistency: Regular  Current Diet Liquid Consistency: Thin       Type of Study: Repeat MBS  Results of Prior Study: Previous results showed + aspiraton of thin liquid and mild penetration with nectar thick liquid.      Patient Complaints/Reason for Referral:  Pili Dunn was referred for a MBS to assess the efficiency of his swallow function, assess for aspiration, and to make recommendations regarding safe dietary consistencies, effective compensatory strategies, and safe eating recommendations were reviewed with pt. following this exam.   Patient Education: yes  Patient Education Response: Verbalizes understanding    Prognosis  Prognosis for safe diet advancement: fair      Goals:    Long Term: To Maximize safety with intake, optimize nutrition/hydration and minimize risk for aspiration. Oral Preparation / Oral Phase  Oral Phase: Impaired   Oral Phase: Decreased A-P with all consistencies. Occasionally expectorated remainder of bolus in oral cavity. Pharyngeal Phase  Pharyngeal Phase: Impaired   Pharyngeal:  -Thin liquid:  + minimal trace penetration to the cords with thin liquid residual.  No aspiration. No cough. Min-mod vallecula and pyriform residual noted. -Nectar thick liquid:  no penetration, no aspiration. Min-mod vallecula residual noted. -Puree:  no penetration, no aspiration. Min-mod vallecula and pyriform residual noted. Decreased with subsequent swallows. Unable to fully clear vallecula. -Soft solid:  no penetration, no aspiration.       -Regular solid:  no penetration, no aspiration. Min-mod vallecula residual noted. Decreased with liquid wash. Esophageal Phase  Esophageal Screen: WFL    Pain   Patient Currently in Pain: No       Therapy Time:   Individual Concurrent Group Co-treatment   Time In 1425         Time Out 1440         Minutes [address]  [facility], 1/30/2020, 3:10 PM

## 2020-01-31 ENCOUNTER — TELEPHONE (OUTPATIENT)
Dept: FAMILY MEDICINE CLINIC | Age: 60
End: 2020-01-31

## 2020-01-31 RX ORDER — MONTELUKAST SODIUM 10 MG/1
TABLET ORAL
Qty: 30 TABLET | Refills: 0 | Status: SHIPPED | OUTPATIENT
Start: 2020-01-31 | End: 2020-03-04 | Stop reason: SDUPTHER

## 2020-01-31 NOTE — TELEPHONE ENCOUNTER
Pt requests results of Swallow study done yesterday. Pt states he was asked by tech if he had pneumonia and he has some questions.

## 2020-02-04 ENCOUNTER — OFFICE VISIT (OUTPATIENT)
Dept: PULMONOLOGY | Age: 60
End: 2020-02-04
Payer: MEDICARE

## 2020-02-04 VITALS
SYSTOLIC BLOOD PRESSURE: 132 MMHG | RESPIRATION RATE: 12 BRPM | WEIGHT: 222.6 LBS | OXYGEN SATURATION: 98 % | HEART RATE: 79 BPM | HEIGHT: 75 IN | BODY MASS INDEX: 27.68 KG/M2 | DIASTOLIC BLOOD PRESSURE: 83 MMHG

## 2020-02-04 PROBLEM — G62.9 POLYNEUROPATHY: Status: ACTIVE | Noted: 2018-12-25

## 2020-02-04 PROBLEM — R93.1 ABNORMAL FINDINGS DIAGNOSTIC IMAGING OF HEART AND CORONARY CIRCULATION: Status: ACTIVE | Noted: 2017-12-28

## 2020-02-04 PROBLEM — E78.49 OTHER HYPERLIPIDEMIA: Status: ACTIVE | Noted: 2017-03-21

## 2020-02-04 PROBLEM — K92.1 MELENA: Status: ACTIVE | Noted: 2017-01-12

## 2020-02-04 PROBLEM — M19.022 ARTHRITIS OF LEFT ELBOW: Status: ACTIVE | Noted: 2018-02-28

## 2020-02-04 PROBLEM — I82.409 ACUTE EMBOLISM AND THROMBOSIS OF DEEP VEIN OF LOWER EXTREMITY (HCC): Status: ACTIVE | Noted: 2017-01-05

## 2020-02-04 PROBLEM — E03.9 HYPOTHYROIDISM: Status: ACTIVE | Noted: 2018-02-28

## 2020-02-04 PROBLEM — R29.6 FREQUENT FALLS: Status: ACTIVE | Noted: 2018-12-25

## 2020-02-04 PROBLEM — R29.898 LEFT ARM WEAKNESS: Status: ACTIVE | Noted: 2017-01-06

## 2020-02-04 PROBLEM — R26.81 GAIT INSTABILITY: Status: ACTIVE | Noted: 2018-08-06

## 2020-02-04 PROBLEM — I25.10 CORONARY ATHEROSCLEROSIS: Status: ACTIVE | Noted: 2018-02-28

## 2020-02-04 PROBLEM — Z91.89 RISK FACTORS FOR OBSTRUCTIVE SLEEP APNEA: Status: ACTIVE | Noted: 2017-12-08

## 2020-02-04 PROBLEM — H90.3 SENSORINEURAL HEARING LOSS (SNHL), BILATERAL: Status: ACTIVE | Noted: 2018-08-06

## 2020-02-04 PROBLEM — Z01.810 ENCOUNTER FOR PREPROCEDURAL CARDIOVASCULAR EXAMINATION: Status: ACTIVE | Noted: 2017-03-21

## 2020-02-04 PROBLEM — I49.9 ARRHYTHMIA: Status: ACTIVE | Noted: 2018-02-28

## 2020-02-04 PROBLEM — I49.9 IRREGULAR HEART BEAT: Status: ACTIVE | Noted: 2018-01-03

## 2020-02-04 PROBLEM — R07.89 OTHER CHEST PAIN: Status: ACTIVE | Noted: 2017-03-21

## 2020-02-04 PROBLEM — M50.30 DEGENERATION OF INTERVERTEBRAL DISC OF CERVICAL REGION: Status: ACTIVE | Noted: 2018-01-03

## 2020-02-04 PROBLEM — R51.9 ACUTE INTRACTABLE HEADACHE: Status: ACTIVE | Noted: 2018-12-25

## 2020-02-04 PROBLEM — R53.83 OTHER FATIGUE: Status: ACTIVE | Noted: 2017-12-08

## 2020-02-04 PROBLEM — G57.93 NEUROPATHIC PAIN OF BOTH FEET: Status: ACTIVE | Noted: 2018-02-28

## 2020-02-04 PROBLEM — I35.1 NONRHEUMATIC AORTIC VALVE INSUFFICIENCY: Status: ACTIVE | Noted: 2017-03-22

## 2020-02-04 PROCEDURE — 99213 OFFICE O/P EST LOW 20 MIN: CPT | Performed by: INTERNAL MEDICINE

## 2020-02-04 PROCEDURE — 3017F COLORECTAL CA SCREEN DOC REV: CPT | Performed by: INTERNAL MEDICINE

## 2020-02-04 PROCEDURE — 1036F TOBACCO NON-USER: CPT | Performed by: INTERNAL MEDICINE

## 2020-02-04 PROCEDURE — G8482 FLU IMMUNIZE ORDER/ADMIN: HCPCS | Performed by: INTERNAL MEDICINE

## 2020-02-04 PROCEDURE — G8419 CALC BMI OUT NRM PARAM NOF/U: HCPCS | Performed by: INTERNAL MEDICINE

## 2020-02-04 PROCEDURE — G8427 DOCREV CUR MEDS BY ELIG CLIN: HCPCS | Performed by: INTERNAL MEDICINE

## 2020-02-04 ASSESSMENT — ENCOUNTER SYMPTOMS
EYES NEGATIVE: 1
BACK PAIN: 1
CHOKING: 1
COUGH: 1
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
## 2020-02-10 ENCOUNTER — TELEPHONE (OUTPATIENT)
Dept: FAMILY MEDICINE CLINIC | Age: 60
End: 2020-02-10

## 2020-02-10 RX ORDER — TRAZODONE HYDROCHLORIDE 50 MG/1
50 TABLET ORAL NIGHTLY
Qty: 15 TABLET | Refills: 0 | Status: SHIPPED | OUTPATIENT
Start: 2020-02-10 | End: 2020-03-24

## 2020-02-10 NOTE — TELEPHONE ENCOUNTER
Pt requests reports of swallow study and office notes from Dr. Phu Zhang and Dr. Isabel Rubio office notes.

## 2020-02-19 ENCOUNTER — OFFICE VISIT (OUTPATIENT)
Dept: FAMILY MEDICINE CLINIC | Age: 60
End: 2020-02-19
Payer: COMMERCIAL

## 2020-02-19 VITALS
HEART RATE: 102 BPM | BODY MASS INDEX: 28.23 KG/M2 | SYSTOLIC BLOOD PRESSURE: 118 MMHG | OXYGEN SATURATION: 96 % | DIASTOLIC BLOOD PRESSURE: 89 MMHG | HEIGHT: 75 IN | WEIGHT: 227 LBS

## 2020-02-19 PROCEDURE — 99214 OFFICE O/P EST MOD 30 MIN: CPT | Performed by: FAMILY MEDICINE

## 2020-02-19 RX ORDER — AMITRIPTYLINE HYDROCHLORIDE 10 MG/1
TABLET, FILM COATED ORAL
COMMUNITY
Start: 2020-02-11 | End: 2021-08-18

## 2020-02-19 RX ORDER — PREDNISONE 10 MG/1
TABLET ORAL
Qty: 15 TABLET | Refills: 0 | Status: SHIPPED | OUTPATIENT
Start: 2020-02-19 | End: 2021-01-20

## 2020-02-19 ASSESSMENT — ENCOUNTER SYMPTOMS
ANAL BLEEDING: 0
BACK PAIN: 1
TROUBLE SWALLOWING: 1
WHEEZING: 0
NAUSEA: 0
VOMITING: 0
CONSTIPATION: 0
SORE THROAT: 0
EYE DISCHARGE: 0
EYE PAIN: 0
SHORTNESS OF BREATH: 0
PHOTOPHOBIA: 0
ABDOMINAL PAIN: 0
SINUS PRESSURE: 0
FACIAL SWELLING: 0
CHEST TIGHTNESS: 0
DIARRHEA: 0
APNEA: 0
COLOR CHANGE: 0
ABDOMINAL DISTENTION: 0

## 2020-02-20 RX ORDER — TIZANIDINE 4 MG/1
4 TABLET ORAL NIGHTLY
Qty: 30 TABLET | Refills: 2 | Status: SHIPPED | OUTPATIENT
Start: 2020-02-20 | End: 2020-03-21

## 2020-02-20 RX ORDER — LEVOFLOXACIN 750 MG/1
750 TABLET ORAL DAILY
Qty: 7 TABLET | Refills: 0 | Status: SHIPPED | OUTPATIENT
Start: 2020-02-20 | End: 2020-02-27

## 2020-02-20 RX ORDER — LEVOFLOXACIN 750 MG/1
750 TABLET ORAL DAILY
Qty: 7 TABLET | Refills: 0 | Status: SHIPPED | OUTPATIENT
Start: 2020-02-20 | End: 2020-02-20 | Stop reason: SDUPTHER

## 2020-02-20 NOTE — PROGRESS NOTES
Take 25 mg by mouth every 8 hours as needed for Nausea      butalbital-acetaminophen-caffeine (FIORICET, ESGIC) -40 MG per tablet   0    baclofen (LIORESAL) 10 MG tablet Take 10 mg by mouth as needed   0    cyclobenzaprine (FLEXERIL) 5 MG tablet Take 5 mg by mouth daily as needed   0    DEXILANT 60 MG CPDR capsule Take 60 mg by mouth daily   0    diazepam (VALIUM) 5 MG tablet Take 5 mg by mouth as needed noon  0    LYRICA 50 MG capsule Take 100 mg by mouth daily   0    amitriptyline (ELAVIL) 10 MG tablet take 1 tablet by mouth at bedtime for 1 week then take 2 tablets by mouth at bedtime THEREAFTER      traZODone (DESYREL) 50 MG tablet Take 1 tablet by mouth nightly for 15 days (Patient not taking: Reported on 2/19/2020) 15 tablet 0    predniSONE (DELTASONE) 10 MG tablet Take 10 mg for 10 days (Patient not taking: Reported on 2/19/2020) 10 tablet 0    Misc. Devices MISC 1 each by Does not apply route once for 1 dose 1 Device 0    ibuprofen (ADVIL;MOTRIN) 800 MG tablet Take 1 tablet by mouth daily 30 tablet 0    predniSONE (DELTASONE) 10 MG tablet Take 30 mg for 4 days, 20 mg for 4 days, 10 mg for 4 days (Patient not taking: Reported on 2/19/2020) 24 tablet 0    Misc. Devices MISC 1 each by Does not apply route once for 1 dose 1 Device 0    mometasone (ELOCON) 0.1 % cream Apply topically daily. 45 Tube 0    fluconazole (DIFLUCAN) 200 MG tablet Take one tablet QOD for 5 days (Patient not taking: Reported on 2/19/2020) 14 tablet 0    VIAGRA 50 MG tablet   1    AFLURIA PRESERVATIVE FREE 0.5 ML JANA injection inject 0.5 milliliter intramuscularly  0    mirtazapine (REMERON) 45 MG tablet Take 45 mg by mouth nightly   0    warfarin (COUMADIN) 5 MG tablet   0     No current facility-administered medications for this visit.       Allergies   Allergen Reactions    Xarelto [Rivaroxaban] Shortness Of Breath    Asa [Aspirin] Other (See Comments)     intolerance    Iv Dye [Iodides]      Allergy to Car Cath Dye (from free text allergy report).  Penicillins     Sulfa Antibiotics Other (See Comments)       Health Maintenance   Topic Date Due    TSH testing  1960    Hepatitis C screen  1960    A1C test (Diabetic or Prediabetic)  10/29/1969    DTaP/Tdap/Td vaccine (1 - Tdap) 10/29/1970    HIV screen  10/29/1974    Lipid screen  10/29/1999    Shingles Vaccine (1 of 2) 10/29/2009    Colon cancer screen colonoscopy  10/11/2028    Flu vaccine  Completed    Pneumococcal 0-64 years Vaccine  Completed    Hepatitis A vaccine  Aged Out    Hepatitis B vaccine  Aged Out    Hib vaccine  Aged Out    Meningococcal (ACWY) vaccine  Aged Out       Subjective:      Review of Systems   Constitutional: Negative for fatigue, fever and unexpected weight change. HENT: Positive for trouble swallowing. Negative for congestion, ear discharge, facial swelling, sinus pressure and sore throat. Eyes: Negative for photophobia, pain and discharge. Respiratory: Negative for apnea, chest tightness, shortness of breath and wheezing. Cardiovascular: Negative for chest pain and palpitations. Gastrointestinal: Negative for abdominal distention, abdominal pain, anal bleeding, constipation, diarrhea, nausea and vomiting. Endocrine: Negative for cold intolerance, heat intolerance, polydipsia, polyphagia and polyuria. Genitourinary: Negative for difficulty urinating, flank pain, frequency and hematuria. Musculoskeletal: Positive for arthralgias, back pain, gait problem, neck pain and neck stiffness. Skin: Negative for color change and rash. Neurological: Positive for weakness, numbness and headaches. Negative for dizziness, syncope, facial asymmetry, speech difficulty and light-headedness. Hematological: Negative for adenopathy. Psychiatric/Behavioral: Positive for decreased concentration, dysphoric mood and sleep disturbance.  Negative for agitation, behavioral problems, confusion, hallucinations and suicidal ideas. The patient is nervous/anxious. The patient is not hyperactive. Objective:     Physical Exam  Vitals signs and nursing note reviewed. Constitutional:       General: He is not in acute distress. Appearance: He is well-developed. HENT:      Head: Normocephalic. Neck:      Musculoskeletal: Normal range of motion and neck supple. Thyroid: No thyromegaly. Cardiovascular:      Rate and Rhythm: Normal rate and regular rhythm. Heart sounds: Normal heart sounds. No murmur. Pulmonary:      Breath sounds: Wheezing present. No rales. Chest:      Chest wall: No tenderness. Abdominal:      General: Bowel sounds are normal. There is no distension. Palpations: Abdomen is soft. There is no mass. Tenderness: There is no abdominal tenderness. There is no guarding or rebound. Musculoskeletal:         General: Tenderness present. Left elbow: He exhibits decreased range of motion and deformity. Tenderness found. Right ankle: He exhibits decreased range of motion. Tenderness. Cervical back: He exhibits decreased range of motion and tenderness. Lumbar back: He exhibits decreased range of motion and tenderness. Lymphadenopathy:      Cervical: No cervical adenopathy. Skin:     General: Skin is warm. Findings: No rash. Neurological:      Mental Status: He is alert and oriented to person, place, and time. Psychiatric:         Attention and Perception: Attention and perception normal.         Mood and Affect: Mood is anxious and depressed. Affect is labile, blunt and tearful. Speech: Speech normal.         Behavior: Behavior normal.         Thought Content: Thought content normal.         Cognition and Memory: Cognition and memory normal.         Judgment: Judgment normal.       /89   Pulse 102   Ht 6' 3\" (1.905 m)   Wt 227 lb (103 kg)   SpO2 96%   BMI 28.37 kg/m²     Assessment:       Diagnosis Orders   1.  Lumbosacral

## 2020-02-25 ENCOUNTER — OFFICE VISIT (OUTPATIENT)
Dept: FAMILY MEDICINE CLINIC | Age: 60
End: 2020-02-25
Payer: COMMERCIAL

## 2020-02-25 VITALS
WEIGHT: 227 LBS | DIASTOLIC BLOOD PRESSURE: 88 MMHG | HEIGHT: 75 IN | HEART RATE: 84 BPM | OXYGEN SATURATION: 97 % | SYSTOLIC BLOOD PRESSURE: 126 MMHG | BODY MASS INDEX: 28.23 KG/M2

## 2020-02-25 PROCEDURE — 99214 OFFICE O/P EST MOD 30 MIN: CPT | Performed by: FAMILY MEDICINE

## 2020-02-25 ASSESSMENT — ENCOUNTER SYMPTOMS
FACIAL SWELLING: 0
ABDOMINAL PAIN: 0
EYE DISCHARGE: 0
COUGH: 1
NAUSEA: 0
VOMITING: 0
APNEA: 0
EYE PAIN: 0
TROUBLE SWALLOWING: 1
CONSTIPATION: 0
SHORTNESS OF BREATH: 0
COLOR CHANGE: 0
WHEEZING: 1
CHEST TIGHTNESS: 0
SINUS PRESSURE: 0
BACK PAIN: 1
SORE THROAT: 0
PHOTOPHOBIA: 0
ANAL BLEEDING: 0
DIARRHEA: 0
ABDOMINAL DISTENTION: 0

## 2020-03-04 RX ORDER — MONTELUKAST SODIUM 10 MG/1
TABLET ORAL
Qty: 30 TABLET | Refills: 0 | Status: SHIPPED | OUTPATIENT
Start: 2020-03-04 | End: 2020-04-07 | Stop reason: SDUPTHER

## 2020-03-05 PROBLEM — Z01.810 ENCOUNTER FOR PREPROCEDURAL CARDIOVASCULAR EXAMINATION: Status: RESOLVED | Noted: 2017-03-21 | Resolved: 2020-03-05

## 2020-03-19 ENCOUNTER — HOSPITAL ENCOUNTER (OUTPATIENT)
Dept: GENERAL RADIOLOGY | Facility: CLINIC | Age: 60
Discharge: HOME | End: 2020-03-21
Payer: COMMERCIAL

## 2020-03-19 ENCOUNTER — HOSPITAL ENCOUNTER (OUTPATIENT)
Facility: CLINIC | Age: 60
Discharge: HOME | End: 2020-03-21
Payer: COMMERCIAL

## 2020-03-19 ENCOUNTER — TELEPHONE (OUTPATIENT)
Dept: FAMILY MEDICINE CLINIC | Age: 60
End: 2020-03-19

## 2020-03-19 ENCOUNTER — OFFICE VISIT (OUTPATIENT)
Dept: PRIMARY CARE CLINIC | Age: 60
End: 2020-03-19
Payer: MEDICARE

## 2020-03-19 VITALS
WEIGHT: 226.6 LBS | HEIGHT: 76 IN | TEMPERATURE: 98.7 F | SYSTOLIC BLOOD PRESSURE: 132 MMHG | HEART RATE: 86 BPM | RESPIRATION RATE: 18 BRPM | BODY MASS INDEX: 27.59 KG/M2 | OXYGEN SATURATION: 99 % | DIASTOLIC BLOOD PRESSURE: 88 MMHG

## 2020-03-19 PROBLEM — K21.9 GASTROESOPHAGEAL REFLUX DISEASE: Status: ACTIVE | Noted: 2020-03-19

## 2020-03-19 PROBLEM — Z98.890 HISTORY OF FUNDOPLICATION: Status: ACTIVE | Noted: 2020-03-19

## 2020-03-19 PROBLEM — G60.9 IDIOPATHIC PERIPHERAL NEUROPATHY: Status: ACTIVE | Noted: 2020-03-19

## 2020-03-19 LAB
INFLUENZA A ANTIBODY: NEGATIVE
INFLUENZA B ANTIBODY: NEGATIVE

## 2020-03-19 PROCEDURE — G8427 DOCREV CUR MEDS BY ELIG CLIN: HCPCS | Performed by: NURSE PRACTITIONER

## 2020-03-19 PROCEDURE — 87804 INFLUENZA ASSAY W/OPTIC: CPT | Performed by: NURSE PRACTITIONER

## 2020-03-19 PROCEDURE — G8419 CALC BMI OUT NRM PARAM NOF/U: HCPCS | Performed by: NURSE PRACTITIONER

## 2020-03-19 PROCEDURE — G8482 FLU IMMUNIZE ORDER/ADMIN: HCPCS | Performed by: NURSE PRACTITIONER

## 2020-03-19 PROCEDURE — 1036F TOBACCO NON-USER: CPT | Performed by: NURSE PRACTITIONER

## 2020-03-19 PROCEDURE — 71046 X-RAY EXAM CHEST 2 VIEWS: CPT

## 2020-03-19 PROCEDURE — 99214 OFFICE O/P EST MOD 30 MIN: CPT | Performed by: NURSE PRACTITIONER

## 2020-03-19 PROCEDURE — 3017F COLORECTAL CA SCREEN DOC REV: CPT | Performed by: NURSE PRACTITIONER

## 2020-03-19 RX ORDER — ONDANSETRON 8 MG/1
TABLET, ORALLY DISINTEGRATING ORAL
COMMUNITY
Start: 2020-03-10

## 2020-03-19 RX ORDER — LEVOTHYROXINE SODIUM 0.05 MG/1
TABLET ORAL
COMMUNITY
Start: 2020-03-03 | End: 2021-11-05

## 2020-03-19 RX ORDER — OXYCODONE HYDROCHLORIDE AND ACETAMINOPHEN 5; 325 MG/1; MG/1
TABLET ORAL
COMMUNITY
Start: 2020-02-05

## 2020-03-19 ASSESSMENT — ENCOUNTER SYMPTOMS
ABDOMINAL PAIN: 0
NAUSEA: 0
SHORTNESS OF BREATH: 0
VOMITING: 0
EYE PAIN: 0
CHEST TIGHTNESS: 0
COUGH: 1
RHINORRHEA: 0
WHEEZING: 0
SORE THROAT: 0

## 2020-03-19 NOTE — PATIENT INSTRUCTIONS
Patient Education        Chronic Cough: Care Instructions  Your Care Instructions    A cough is your body's response to something that bothers your throat or airways. Many things can cause a cough. You might cough because of a cold or the flu, bronchitis, or asthma. Smoking, postnasal drip, allergies, and stomach acid that backs up into your throat also can cause a cough. A cough can be short-term (acute) or long-term (chronic). A chronic cough lasts more than 3 weeks. A chronic cough is often caused by a long-term problem, such as asthma. Another cause might be a medicine, such as an ACE inhibitor. A cough is a symptom, not a disease. To treat a chronic cough, you may need to treat the problem that causes it. You can take a few steps at home to cough less and feel better. Some people cough or clear their throat out of habit for no clear reason. Follow-up care is a key part of your treatment and safety. Be sure to make and go to all appointments, and call your doctor if you are having problems. It's also a good idea to know your test results and keep a list of the medicines you take. How can you care for yourself at home? · Drink plenty of water and other fluids. This may help soothe a dry or sore throat. Honey or lemon juice in hot water or tea may ease a dry cough. · Prop up your head on pillows to help you breathe and ease a cough. · Do not smoke or allow others to smoke around you. Smoke can make a cough worse. If you need help quitting, talk to your doctor about stop-smoking programs and medicines. These can increase your chances of quitting for good. · Avoid exposure to smoke, dust, or other pollutants, or wear a face mask. Check with your doctor or pharmacist to find out which type of face mask will give you the most benefit. · Take cough medicine as directed by your doctor. · Try cough drops to soothe a dry or sore throat. Cough drops don't stop a cough.  Medicine-flavored cough drops are no petting, snuggling, being kissed or licked, and sharing food. If you must care for your pet or be around animals while you are sick, wash your hands before and after you interact with pets and wear a facemask. Call ahead before visiting your doctor  If you have a medical appointment, call the healthcare provider and tell them that you have or may have COVID-19. This will help the healthcare providers office take steps to keep other people from getting infected or exposed. Wear a facemask  You should wear a facemask when you are around other people (e.g., sharing a room or vehicle) or pets and before you enter a healthcare providers office. If you are not able to wear a facemask (for example, because it causes trouble breathing), then people who live with you should not stay in the same room with you, or they should wear a facemask if they enter your room. Cover your coughs and sneezes  Cover your mouth and nose with a tissue when you cough or sneeze. Throw used tissues in a lined trash can. Immediately wash your hands with soap and water for at least 20 seconds or, if soap and water are not available, clean your hands with an alcohol-based hand  that contains at least 60% alcohol. Clean your hands often  Wash your hands often with soap and water for at least 20 seconds, especially after blowing your nose, coughing, or sneezing; going to the bathroom; and before eating or preparing food. If soap and water are not readily available, use an alcohol-based hand  with at least 60% alcohol, covering all surfaces of your hands and rubbing them together until they feel dry. Soap and water are the best option if hands are visibly dirty. Avoid touching your eyes, nose, and mouth with unwashed hands. Avoid sharing personal household items  You should not share dishes, drinking glasses, cups, eating utensils, towels, or bedding with other people or pets in your home.  After using these items, they should

## 2020-03-19 NOTE — PROGRESS NOTES
[facility]  [facility]  [address] [place] [address]  [address] [address]  Dept: [phone]  Dept Fax: [phone]    Anna Roberts is a 60 y.o. male who presents today for his medical conditions/complaints of   Chief Complaint   Patient presents with    Cough    Congestion          HPI:     /88 (Site: Left Upper Arm, Position: Sitting)   Pulse 86   Temp 98.7 °F (37.1 °C) (Temporal)   Resp 18   Ht 6' 4\" (1.93 m)   Wt 226 lb 9.6 oz (102.8 kg)   SpO2 99%   BMI 27.58 kg/m²       HPI Pt presented to the clinic today with c/o cough. This is a problem that has been going on for 2.5 months. Pt states he was treated with different antibiotics and oral prednisone by his PCP. He also follows with pulmonary, Dr. Silvia Rodrigez. The cough is dry. He also c/o headache, fatigue, body aches. No fever, chills, SOB, chest pain, abdominal pain, nausea, vomiting. Pt states that he had possible exposure to COVID-19 while at a .  He is requesting testing to be done, however we are not able to perform testing at this location at this time. He states he was sent here by his PCP.      Past Medical History:   Diagnosis Date    Acute asthmatic bronchitis     Anemia     Hyperplastic anemia yrs ago    Anesthesia complication     \"angry after surgery can pull things out\"    Congestion and hemorrhage of prostate     Contusion of back(802.31)     wc    DDD (degenerative disc disease)     H/O cardiac catheterization     no stent    Hx of blood clots     after last ankle surgery    Impaired fasting glucose     Impotence of organic origin     wc    Leaky heart valve     \"slight\"    Major depressive disorder, single episode, moderate (HCC)     wc    Migraine with aura, with intractable migraine, so stated, without mention of status migrainosus     wc   nausea and vominting daily patient on zofran     Other disorders of bone and cartilage(927.99)     wc    Persistent cough finished both antibiotics    PONV (postoperative nausea and vomiting)     Postconcussion syndrome     wc    Primary insomnia     Risk for falls     Self-catheterizes urinary bladder     Shortness of breath     Sprain of ankle, unspecified site     wc    Sprain of foot, unspecified site     wc    Sprain of lumbar region     wc    Unspecified urinary incontinence     wc        Past Surgical History:   Procedure Laterality Date    ACHILLES TENDON SURGERY Right 11/19/2015    lengthing    CERVICAL FUSION      C4-5    COLONOSCOPY      ELBOW SURGERY Left     ENDOSCOPY, COLON, DIAGNOSTIC      FOOT SURGERY Bilateral     FOOT SURGERY Right 03/30/2017    right achilles revision    FRACTURE SURGERY Left     elbow  fall three months ago       GASTRIC FUNDOPLICATION      LEG SURGERY Bilateral     MN DEEP DISSEC FOOT INFEC,1 BURSA Right 3/30/2017    RIGHT ACHILLES HYPERTROPHIC SCAR REVISION WITH EXCISION & ROTATIONAL FLAP  WITH PRP(CELLSAVER)  & CLARIX [place] GRAFT  performed by Jenae Sandhu MD at [facility]         History reviewed. No pertinent family history. Social History     Tobacco Use    Smoking status: Never Smoker    Smokeless tobacco: Never Used   Substance Use Topics    Alcohol use: No        Prior to Visit Medications    Medication Sig Taking?  Authorizing Provider   oxyCODONE-acetaminophen (PERCOCET) 5-325 MG per tablet take 1 tablet by mouth once daily if needed Yes Historical Provider, MD   ondansetron (ZOFRAN-ODT) 8 MG TBDP disintegrating tablet dissolve 1 tablet under the tongue twice a day if needed Yes Historical Provider, MD   levothyroxine (SYNTHROID) 50 MCG tablet take 1 tablet by mouth every morning ON AN EMPTY STOMACH Yes Historical Provider, MD   promethazine (PHENERGAN) 25 MG tablet Take 25 mg by mouth every 8 hours as needed for Nausea Yes Historical Provider, MD   montelukast (SINGULAIR) 10 MG tablet One daily  Patient not taking: Reported on 3/19/2020 Reported on 3/19/2020  Michelle Lai MD   mometasone (ELOCON) 0.1 % cream Apply topically daily. Patient not taking: Reported on 3/19/2020  Michelle Lai MD   fluconazole (DIFLUCAN) 200 MG tablet Take one tablet QOD for 5 days  Patient not taking: Reported on 2/25/2020  Michelle Lai MD   VIAGRA 50 MG tablet   Historical Provider, MD   AFLURIA PRESERVATIVE FREE 0.5 ML JANA injection inject 0.5 milliliter intramuscularly  Historical Provider, MD   butalbital-acetaminophen-caffeine (FIORICET, ESGIC) 50-56-36 MG per tablet   Historical Provider, MD   mirtazapine (REMERON) 45 MG tablet Take 45 mg by mouth nightly   Historical Provider, MD   baclofen (LIORESAL) 10 MG tablet Take 10 mg by mouth as needed   Historical Provider, MD   warfarin (COUMADIN) 5 MG tablet   Historical Provider, MD   cyclobenzaprine (FLEXERIL) 5 MG tablet Take 5 mg by mouth daily as needed   Historical Provider, MD   DEXILANT 60 MG CPDR capsule Take 60 mg by mouth daily   Historical Provider, MD   diazepam (VALIUM) 5 MG tablet Take 5 mg by mouth as needed noon  Historical Provider, MD   LYRICA 50 MG capsule Take 100 mg by mouth daily   Historical Provider, MD       Allergies   Allergen Reactions    Rivaroxaban Shortness Of Breath and Other (See Comments)    Asa [Aspirin] Other (See Comments)     intolerance    Iv Dye [Iodides]      Allergy to Car Cath Dye (from free text allergy report).  Penicillins     Sulfa Antibiotics Other (See Comments)         Subjective:      Review of Systems   Constitutional: Positive for fatigue. Negative for chills and fever. HENT: Positive for congestion. Negative for ear pain, rhinorrhea and sore throat. Eyes: Negative for pain and visual disturbance. Respiratory: Positive for cough (dry). Negative for chest tightness, shortness of breath and wheezing. Cardiovascular: Negative for chest pain, palpitations and leg swelling.    Gastrointestinal: Negative for abdominal pain, nausea and vomiting. Genitourinary: Negative for decreased urine volume and difficulty urinating. Musculoskeletal: Positive for arthralgias. Negative for gait problem, myalgias and neck pain. Skin: Negative for pallor and rash. Neurological: Positive for headaches. Negative for weakness and light-headedness. Psychiatric/Behavioral: Negative for sleep disturbance. Objective:     Physical Exam  Vitals signs and nursing note reviewed. Constitutional:       General: He is not in acute distress. Appearance: Normal appearance. He is not ill-appearing. HENT:      Head: Normocephalic and atraumatic. Right Ear: Tympanic membrane and ear canal normal.      Left Ear: Tympanic membrane and ear canal normal.      Nose: Congestion present. Right Sinus: No maxillary sinus tenderness or frontal sinus tenderness. Left Sinus: No maxillary sinus tenderness or frontal sinus tenderness. Mouth/Throat:      Lips: Pink. Mouth: Mucous membranes are moist.      Pharynx: Oropharynx is clear. Uvula midline. No pharyngeal swelling, oropharyngeal exudate, posterior oropharyngeal erythema or uvula swelling. Comments: Post nasal drip present. Eyes:      Extraocular Movements: Extraocular movements intact. Conjunctiva/sclera: Conjunctivae normal.      Pupils: Pupils are equal, round, and reactive to light. Neck:      Musculoskeletal: Normal range of motion and neck supple. Cardiovascular:      Rate and Rhythm: Normal rate and regular rhythm. Pulses: Normal pulses. Pulmonary:      Effort: Pulmonary effort is normal. No tachypnea. Breath sounds: Normal breath sounds. No wheezing, rhonchi or rales. Abdominal:      General: Bowel sounds are normal. There is no distension. Palpations: Abdomen is soft. Tenderness: There is no abdominal tenderness. Musculoskeletal: Normal range of motion. Lymphadenopathy:      Cervical: No cervical adenopathy.    Skin: signs of parenchymal consolidation on exam. There are no changes in mental status or behavioral changes. The patient does not have chronic obstructive pulmonary disease. Pt to fill and take medications as prescribed. Rest, increase fluids. Return if no improvement in symptoms. Go to the ER for any emergent concern. Patient given educational materials - see patientinstructions. Discussed use, benefit, and side effects of prescribed medications. All patient questions answered. Pt verbalized understanding. Instructed to continue current medications, diet and exercise. Patient agreedwith treatment plan. Follow up as directed.      Electronically signed by ALEKSANDRA Maher CNP on 3/19/2020 at 4:14 PM

## 2020-03-19 NOTE — PROGRESS NOTES
Pt is here for cough, loose stools, and chest congestion. He states PCP has been treating him for pneumonia x2.5 months. The nuns who sat near him at a recent  tested positive for COVID-19. Pt states he has had a low-grade fever off and on.

## 2020-03-20 ENCOUNTER — OFFICE VISIT (OUTPATIENT)
Dept: FAMILY MEDICINE CLINIC | Age: 60
End: 2020-03-20
Payer: MEDICARE

## 2020-03-20 VITALS
WEIGHT: 229 LBS | BODY MASS INDEX: 27.89 KG/M2 | DIASTOLIC BLOOD PRESSURE: 75 MMHG | HEART RATE: 83 BPM | TEMPERATURE: 97.5 F | SYSTOLIC BLOOD PRESSURE: 117 MMHG | HEIGHT: 76 IN

## 2020-03-20 PROCEDURE — 3017F COLORECTAL CA SCREEN DOC REV: CPT | Performed by: FAMILY MEDICINE

## 2020-03-20 PROCEDURE — G8482 FLU IMMUNIZE ORDER/ADMIN: HCPCS | Performed by: FAMILY MEDICINE

## 2020-03-20 PROCEDURE — 99214 OFFICE O/P EST MOD 30 MIN: CPT | Performed by: FAMILY MEDICINE

## 2020-03-20 PROCEDURE — G8419 CALC BMI OUT NRM PARAM NOF/U: HCPCS | Performed by: FAMILY MEDICINE

## 2020-03-20 PROCEDURE — G8427 DOCREV CUR MEDS BY ELIG CLIN: HCPCS | Performed by: FAMILY MEDICINE

## 2020-03-20 PROCEDURE — 1036F TOBACCO NON-USER: CPT | Performed by: FAMILY MEDICINE

## 2020-03-20 RX ORDER — CIPROFLOXACIN 500 MG/1
500 TABLET, FILM COATED ORAL 2 TIMES DAILY
Qty: 14 TABLET | Refills: 0 | Status: SHIPPED | OUTPATIENT
Start: 2020-03-20 | End: 2020-03-27

## 2020-03-20 ASSESSMENT — ENCOUNTER SYMPTOMS
ABDOMINAL PAIN: 1
PHOTOPHOBIA: 0
EYE PAIN: 0
DIARRHEA: 0
APNEA: 0
CONSTIPATION: 0
ABDOMINAL DISTENTION: 0
VOMITING: 0
EYE DISCHARGE: 0
SINUS PRESSURE: 0
CHEST TIGHTNESS: 0
COLOR CHANGE: 0
FACIAL SWELLING: 0
WHEEZING: 0
BACK PAIN: 0
NAUSEA: 1
SHORTNESS OF BREATH: 0
TROUBLE SWALLOWING: 0
ANAL BLEEDING: 0
SORE THROAT: 0

## 2020-03-20 NOTE — PROGRESS NOTES
cartilage(733.99)     wc    Persistent cough     finished both antibiotics    PONV (postoperative nausea and vomiting)     Postconcussion syndrome     wc    Primary insomnia     Risk for falls     Self-catheterizes urinary bladder     Shortness of breath     Sprain of ankle, unspecified site     wc    Sprain of foot, unspecified site     wc    Sprain of lumbar region     wc    Unspecified urinary incontinence     wc      Past Surgical History:   Procedure Laterality Date    ACHILLES TENDON SURGERY Right 11/19/2015    lengthing    CERVICAL FUSION      C4-5    COLONOSCOPY      ELBOW SURGERY Left     ENDOSCOPY, COLON, DIAGNOSTIC      FOOT SURGERY Bilateral     FOOT SURGERY Right 03/30/2017    right achilles revision    FRACTURE SURGERY Left     elbow  fall three months ago       GASTRIC FUNDOPLICATION      LEG SURGERY Bilateral     NM DEEP DISSEC FOOT INFEC,1 BURSA Right 3/30/2017    RIGHT ACHILLES HYPERTROPHIC SCAR REVISION WITH EXCISION & ROTATIONAL FLAP  WITH PRP(CELLSAVER)  & CLARIX Earla Macadam GRAFT  performed by Paul Montelongo MD at [facility]         No family history on file.     Social History     Tobacco Use    Smoking status: Never Smoker    Smokeless tobacco: Never Used   Substance Use Topics    Alcohol use: No      Current Outpatient Medications   Medication Sig Dispense Refill    ciprofloxacin (CIPRO) 500 MG tablet Take 1 tablet by mouth 2 times daily for 7 days 14 tablet 0    oxyCODONE-acetaminophen (PERCOCET) 5-325 MG per tablet take 1 tablet by mouth once daily if needed      ondansetron (ZOFRAN-ODT) 8 MG TBDP disintegrating tablet dissolve 1 tablet under the tongue twice a day if needed      levothyroxine (SYNTHROID) 50 MCG tablet take 1 tablet by mouth every morning ON AN EMPTY STOMACH      montelukast (SINGULAIR) 10 MG tablet One daily 30 tablet 0    topiramate (TOPAMAX) 25 MG tablet take 1 tablet by mouth twice a day 60 tablet 3    tiZANidine (ZANAFLEX) 4 MG tablet Take 1 tablet by mouth nightly 30 tablet 2    amitriptyline (ELAVIL) 10 MG tablet take 1 tablet by mouth at bedtime for 1 week then take 2 tablets by mouth at bedtime THEREAFTER      diclofenac sodium 1 % GEL Apply 2 g topically 4 times daily 6 Tube 1    sildenafil (VIAGRA) 50 MG tablet Take 2 tablets by mouth daily as needed for Erectile Dysfunction 60 tablet 0    traZODone (DESYREL) 50 MG tablet take 1 tablet by mouth every evening if needed for sleep 15 tablet 0    magnesium oxide (MAG-OX) 400 (240 Mg) MG tablet take 1 tablet by mouth once daily 30 tablet 3    mometasone (ELOCON) 0.1 % cream Apply topically daily. 45 Tube 0    promethazine (PHENERGAN) 25 MG tablet Take 25 mg by mouth every 8 hours as needed for Nausea      VIAGRA 50 MG tablet   1    butalbital-acetaminophen-caffeine (FIORICET, ESGIC) -40 MG per tablet   0    mirtazapine (REMERON) 45 MG tablet Take 45 mg by mouth nightly   0    baclofen (LIORESAL) 10 MG tablet Take 10 mg by mouth as needed   0    warfarin (COUMADIN) 5 MG tablet   0    cyclobenzaprine (FLEXERIL) 5 MG tablet Take 5 mg by mouth daily as needed   0    DEXILANT 60 MG CPDR capsule Take 60 mg by mouth daily   0    diazepam (VALIUM) 5 MG tablet Take 5 mg by mouth as needed noon  0    LYRICA 50 MG capsule Take 100 mg by mouth daily   0    predniSONE (DELTASONE) 10 MG tablet Take 2 tab a day x 5 days then 1 tab a day x 5 days (Patient not taking: Reported on 2/25/2020) 15 tablet 0    traZODone (DESYREL) 50 MG tablet Take 1 tablet by mouth nightly for 15 days (Patient not taking: Reported on 2/19/2020) 15 tablet 0    predniSONE (DELTASONE) 10 MG tablet Take 10 mg for 10 days (Patient not taking: Reported on 2/19/2020) 10 tablet 0    Misc.  Devices MISC 1 each by Does not apply route once for 1 dose 1 Device 0    ibuprofen (ADVIL;MOTRIN) 800 MG tablet Take 1 tablet by mouth daily 30 tablet 0    predniSONE (DELTASONE) 10 MG tablet Take 30 mg for 4 days, 20 mg for 4 days, 10 mg for 4 days (Patient not taking: Reported on 2/19/2020) 24 tablet 0    Misc. Devices MISC 1 each by Does not apply route once for 1 dose 1 Device 0    fluconazole (DIFLUCAN) 200 MG tablet Take one tablet QOD for 5 days (Patient not taking: Reported on 2/25/2020) 14 tablet 0    AFLURIA PRESERVATIVE FREE 0.5 ML JANA injection inject 0.5 milliliter intramuscularly  0     No current facility-administered medications for this visit. Allergies   Allergen Reactions    Rivaroxaban Shortness Of Breath and Other (See Comments)    Asa [Aspirin] Other (See Comments)     intolerance    Iv Dye [Iodides]      Allergy to Car Cath Dye (from free text allergy report).  Penicillins     Sulfa Antibiotics Other (See Comments)       Health Maintenance   Topic Date Due    TSH testing  1960    Hepatitis C screen  1960    A1C test (Diabetic or Prediabetic)  10/29/1969    HIV screen  10/29/1974    DTaP/Tdap/Td vaccine (1 - Tdap) 10/29/1978    Lipid screen  10/29/1999    Shingles Vaccine (1 of 2) 10/29/2009    Colon cancer screen colonoscopy  10/11/2028    Flu vaccine  Completed    Pneumococcal 0-64 years Vaccine  Completed    Hepatitis A vaccine  Aged Out    Hepatitis B vaccine  Aged Out    Hib vaccine  Aged Out    Meningococcal (ACWY) vaccine  Aged Out       Subjective:      Review of Systems   Constitutional: Negative for fatigue, fever and unexpected weight change. HENT: Negative for congestion, ear discharge, facial swelling, sinus pressure, sore throat and trouble swallowing. Eyes: Negative for photophobia, pain and discharge. Respiratory: Negative for apnea, chest tightness, shortness of breath and wheezing. Cardiovascular: Negative for chest pain and palpitations. Gastrointestinal: Positive for abdominal pain and nausea. Negative for abdominal distention, anal bleeding, constipation, diarrhea and vomiting.    Endocrine: Negative for cold intolerance, heat labile. Speech: Speech normal.         Behavior: Behavior normal. Behavior is cooperative. Thought Content: Thought content normal.         Cognition and Memory: Cognition and memory normal.         Judgment: Judgment normal.       /75   Pulse 83   Temp 97.5 °F (36.4 °C) (Oral)   Ht 6' 3.98\" (1.93 m)   Wt 229 lb (103.9 kg)   BMI 27.89 kg/m²     Assessment:       Diagnosis Orders   1. Right lower quadrant pain  US APPENDIX    ciprofloxacin (CIPRO) 500 MG tablet   2. Other acute appendicitis SUSPECTED    3. Bipolar affective disorder, currently depressed, mild ([address] compensated    4. Persistent cough improving                  Plan:    Cipro bid x 7  U/S appendix  Dietary modifications  Otherwise  the current medical regimen is effective;  continue present plan and medications. Counseling    Return in about 1 week (around 3/27/2020) for follow up. Orders Placed This Encounter   Procedures    US APPENDIX     Standing Status:   Future     Standing Expiration Date:   3/20/2021     Order Specific Question:   Reason for exam:     Answer:   DX rt lower quadrant pain        Patient given educational materials - see patient instructions. Discussed use, benefit,and side effects of prescribed medications. All patient questions answered. Pt voiced understanding. Reviewed health maintenance. Instructed to continue current medications, diet and exercise. Patient agreed withtreatment plan. Follow up as directed.      Electronically signed by Angela Mancini MD on 3/20/2020 at 11:25 AM

## 2020-03-23 ENCOUNTER — TELEPHONE (OUTPATIENT)
Dept: PULMONOLOGY | Age: 60
End: 2020-03-23

## 2020-03-23 NOTE — TELEPHONE ENCOUNTER
I called the patient to reschedule if possible. The patient said that he is still having difficulty. He is still SOB and says he is wheezing and fees crappy. He saw his PCP and had a CXR 3/19/20 which was clear. He also says he was exposed to Covid 19 and was sent to the flu clinic but they did not test him, I'm unclear why. He is angry about that.  Please advise

## 2020-03-24 RX ORDER — TRAZODONE HYDROCHLORIDE 50 MG/1
TABLET ORAL
Qty: 15 TABLET | Refills: 0 | Status: SHIPPED | OUTPATIENT
Start: 2020-03-24 | End: 2020-04-06

## 2020-03-25 ENCOUNTER — TELEPHONE (OUTPATIENT)
Dept: FAMILY MEDICINE CLINIC | Age: 60
End: 2020-03-25

## 2020-03-25 NOTE — TELEPHONE ENCOUNTER
Central scheduling calling to see the urgency of US appendix. Can pt wait to have this done or is this urgent?  Scheduling is limited due to covid-19

## 2020-03-30 ENCOUNTER — TELEPHONE (OUTPATIENT)
Dept: FAMILY MEDICINE CLINIC | Age: 60
End: 2020-03-30

## 2020-03-30 RX ORDER — AZITHROMYCIN 500 MG/1
500 TABLET, FILM COATED ORAL DAILY
Qty: 5 TABLET | Refills: 0 | Status: SHIPPED | OUTPATIENT
Start: 2020-03-30 | End: 2020-03-31 | Stop reason: SDUPTHER

## 2020-03-30 NOTE — TELEPHONE ENCOUNTER
Pt c/o sinus infection with yellow phlegm, eye discharge and sinus pressure for 3 days and request zpak. Med is pended.

## 2020-03-31 RX ORDER — AZITHROMYCIN 500 MG/1
500 TABLET, FILM COATED ORAL DAILY
Qty: 5 TABLET | Refills: 0 | Status: SHIPPED | OUTPATIENT
Start: 2020-03-31 | End: 2020-04-05

## 2020-04-06 RX ORDER — TRAZODONE HYDROCHLORIDE 50 MG/1
TABLET ORAL
Qty: 15 TABLET | Refills: 0 | Status: SHIPPED | OUTPATIENT
Start: 2020-04-06 | End: 2020-05-21 | Stop reason: SDUPTHER

## 2020-04-07 RX ORDER — MONTELUKAST SODIUM 10 MG/1
TABLET ORAL
Qty: 30 TABLET | Refills: 5 | Status: SHIPPED | OUTPATIENT
Start: 2020-04-07 | End: 2021-01-20

## 2020-04-20 RX ORDER — TRAZODONE HYDROCHLORIDE 50 MG/1
TABLET ORAL
Qty: 30 TABLET | Refills: 0 | Status: SHIPPED | OUTPATIENT
Start: 2020-04-20 | End: 2021-01-20

## 2020-04-24 ENCOUNTER — OFFICE VISIT (OUTPATIENT)
Dept: FAMILY MEDICINE CLINIC | Age: 60
End: 2020-04-24
Payer: COMMERCIAL

## 2020-04-24 VITALS
BODY MASS INDEX: 27.89 KG/M2 | HEIGHT: 76 IN | WEIGHT: 229 LBS | SYSTOLIC BLOOD PRESSURE: 132 MMHG | HEART RATE: 60 BPM | OXYGEN SATURATION: 98 % | DIASTOLIC BLOOD PRESSURE: 83 MMHG

## 2020-04-24 PROCEDURE — 99214 OFFICE O/P EST MOD 30 MIN: CPT | Performed by: FAMILY MEDICINE

## 2020-04-24 ASSESSMENT — ENCOUNTER SYMPTOMS
TROUBLE SWALLOWING: 1
VOMITING: 0
BACK PAIN: 1
CHEST TIGHTNESS: 0
ABDOMINAL PAIN: 0
EYE DISCHARGE: 0
SINUS PRESSURE: 0
DIARRHEA: 0
APNEA: 0
SORE THROAT: 0
COLOR CHANGE: 0
ANAL BLEEDING: 0
NAUSEA: 0
ABDOMINAL DISTENTION: 0
SHORTNESS OF BREATH: 0
PHOTOPHOBIA: 0
EYE PAIN: 0
WHEEZING: 0
FACIAL SWELLING: 0
CONSTIPATION: 0

## 2020-04-24 NOTE — PROGRESS NOTES
Titi Carter MD, PhD FAAFP  [place] [address]      Kaiser Winter is a 60 y.o. male who presents today for his medical conditions/complaints as noted below. Kaiser Winter is c/o of   Chief Complaint   Patient presents with    1 Month Follow-Up     [facility]         HPI:     Neck Pain    This is a chronic problem. The current episode started more than 1 year ago. The problem has been waxing and waning. The pain is associated with a remote injury. The pain is present in the left side, midline and occipital region. The pain is at a severity of 4/10. The pain is moderate. Associated symptoms include numbness, paresis and trouble swallowing. Pertinent negatives include no chest pain, fever or photophobia.        No results found for: LABA1C          ( goal A1C is < 7)   No results found for: LABMICR  No results found for: LDLCHOLESTEROL, LDLCALC    (goal LDL is <100)   BUN (mg/dL)   Date Value   12/30/2015 13     BP Readings from Last 3 Encounters:   04/24/20 132/83   03/20/20 117/75   03/19/20 132/88          (goal 120/80)    Past Medical History:   Diagnosis Date    Acute asthmatic bronchitis     Anemia     Hyperplastic anemia yrs ago    Anesthesia complication     \"angry after surgery can pull things out\"    Congestion and hemorrhage of prostate     Contusion of back(922.31)     wc    DDD (degenerative disc disease)     H/O cardiac catheterization     no stent    Hx of blood clots     after last ankle surgery    Impaired fasting glucose     Impotence of organic origin     wc    Leaky heart valve     \"slight\"    Major depressive disorder, single episode, moderate (HCC)     wc    Migraine with aura, with intractable migraine, so stated, without mention of status migrainosus     wc   nausea and vominting daily patient on zofran     Other disorders of bone and cartilage(733.99)     wc    Persistent cough     finished both antibiotics    PONV (postoperative nausea and vomiting)     Postconcussion syndrome     wc    Primary insomnia     Risk for falls     Self-catheterizes urinary bladder     Shortness of breath     Sprain of ankle, unspecified site     wc    Sprain of foot, unspecified site     wc    Sprain of lumbar region     wc    Unspecified urinary incontinence     wc      Past Surgical History:   Procedure Laterality Date    ACHILLES TENDON SURGERY Right 11/19/2015    lengthing    CERVICAL FUSION      C4-5    COLONOSCOPY      ELBOW SURGERY Left     ENDOSCOPY, COLON, DIAGNOSTIC      FOOT SURGERY Bilateral     FOOT SURGERY Right 03/30/2017    right achilles revision    FRACTURE SURGERY Left     elbow  fall three months ago       GASTRIC FUNDOPLICATION      LEG SURGERY Bilateral     AL DEEP DISSEC FOOT INFEC,1 BURSA Right 3/30/2017    RIGHT ACHILLES HYPERTROPHIC SCAR REVISION WITH EXCISION & ROTATIONAL FLAP  WITH PRP(CELLSAVER)  & CLARIX Ma Balloon GRAFT  performed by Rich Pack MD at [address]         No family history on file.     Social History     Tobacco Use    Smoking status: Never Smoker    Smokeless tobacco: Never Used   Substance Use Topics    Alcohol use: No      Current Outpatient Medications   Medication Sig Dispense Refill    traZODone (DESYREL) 50 MG tablet take 1 tablet by mouth every evening if needed for sleep 30 tablet 0    montelukast (SINGULAIR) 10 MG tablet One daily 30 tablet 5    traZODone (DESYREL) 50 MG tablet take 1 tablet by mouth every evening 15 tablet 0    oxyCODONE-acetaminophen (PERCOCET) 5-325 MG per tablet take 1 tablet by mouth once daily if needed      ondansetron (ZOFRAN-ODT) 8 MG TBDP disintegrating tablet dissolve 1 tablet under the tongue twice a day if needed      levothyroxine (SYNTHROID) 50 MCG tablet take 1 tablet by mouth every morning ON AN EMPTY STOMACH      topiramate (TOPAMAX) 25 MG tablet take 1 tablet by mouth twice a day 60 tablet 3    Neurological: Positive for numbness. Negative for dizziness, syncope, facial asymmetry, speech difficulty and light-headedness. Hematological: Negative for adenopathy. Psychiatric/Behavioral: Positive for dysphoric mood and sleep disturbance. Negative for agitation, behavioral problems, confusion, hallucinations and suicidal ideas. The patient is nervous/anxious. The patient is not hyperactive. Objective:     Physical Exam  Musculoskeletal:         General: Tenderness and deformity present. Left elbow: He exhibits decreased range of motion and deformity. Tenderness found. Right ankle: He exhibits decreased range of motion. Tenderness. Cervical back: He exhibits decreased range of motion and tenderness. Lumbar back: He exhibits decreased range of motion and tenderness. Back:         Arms:    Psychiatric:         Attention and Perception: Attention and perception normal.         Mood and Affect: Mood is anxious. Affect is labile. Speech: Speech normal.         Behavior: Behavior normal. Behavior is cooperative. Thought Content: Thought content normal.         Cognition and Memory: Cognition and memory normal.         Judgment: Judgment normal.       /83   Pulse 60   Ht 6' 4\" (1.93 m)   Wt 229 lb (103.9 kg)   SpO2 98%   BMI 27.87 kg/m²     Assessment:       Diagnosis Orders   1. Displacement of intervertebral disc at C4-C5 level     2. Lumbosacral radiculopathy at L5     3. Postconcussion syndrome     4. Cubital tunnel syndrome on left     5. Degeneration of cervical intervertebral disc     6. Lesion of left lateral popliteal nerve     7. Cervical dysphagia                   Plan:    Aspiration and fall precautions  The current medical regimen is effective;  continue present plan and medications. Counseling  ROM cervical/lumbar left elbow and right ankle exercise daily  Return in about 1 month (around 5/24/2020) for follow up.     No orders of the

## 2020-05-21 RX ORDER — TRAZODONE HYDROCHLORIDE 50 MG/1
TABLET ORAL
Qty: 15 TABLET | Refills: 0 | Status: SHIPPED | OUTPATIENT
Start: 2020-05-21 | End: 2020-06-10

## 2020-05-29 ENCOUNTER — OFFICE VISIT (OUTPATIENT)
Dept: FAMILY MEDICINE CLINIC | Age: 60
End: 2020-05-29
Payer: COMMERCIAL

## 2020-05-29 VITALS
WEIGHT: 226.6 LBS | BODY MASS INDEX: 28.17 KG/M2 | TEMPERATURE: 98.3 F | DIASTOLIC BLOOD PRESSURE: 72 MMHG | HEART RATE: 57 BPM | HEIGHT: 75 IN | OXYGEN SATURATION: 96 % | SYSTOLIC BLOOD PRESSURE: 125 MMHG

## 2020-05-29 PROCEDURE — 99214 OFFICE O/P EST MOD 30 MIN: CPT | Performed by: FAMILY MEDICINE

## 2020-05-29 RX ORDER — IBUPROFEN 800 MG/1
800 TABLET ORAL DAILY
Qty: 30 TABLET | Refills: 0 | Status: SHIPPED | OUTPATIENT
Start: 2020-05-29 | End: 2020-06-22 | Stop reason: SDUPTHER

## 2020-05-29 RX ORDER — MONTELUKAST SODIUM 10 MG/1
10 TABLET ORAL NIGHTLY
Qty: 30 TABLET | Refills: 1 | Status: SHIPPED | OUTPATIENT
Start: 2020-05-29 | End: 2020-06-22 | Stop reason: SDUPTHER

## 2020-05-29 ASSESSMENT — ENCOUNTER SYMPTOMS
SORE THROAT: 0
EYE PAIN: 0
CONSTIPATION: 0
FACIAL SWELLING: 0
APNEA: 0
ANAL BLEEDING: 0
BACK PAIN: 1
COLOR CHANGE: 0
NAUSEA: 0
SHORTNESS OF BREATH: 0
SINUS PRESSURE: 0
EYE DISCHARGE: 0
PHOTOPHOBIA: 0
VOMITING: 0
DIARRHEA: 0
ABDOMINAL DISTENTION: 0
CHEST TIGHTNESS: 0
ABDOMINAL PAIN: 0
WHEEZING: 0
TROUBLE SWALLOWING: 1

## 2020-06-08 NOTE — TELEPHONE ENCOUNTER
Patient calling because he said he had appt with physician and physician state he had call in because of some of the symptoms patient is having patient states he has been experiencing fatigue, dizziness, bodyaches, patient states he needs labs I will pend routine labs for patient to have drawn for physician to sign.  Please advise

## 2020-06-09 ENCOUNTER — OFFICE VISIT (OUTPATIENT)
Dept: PULMONOLOGY | Age: 60
End: 2020-06-09
Payer: COMMERCIAL

## 2020-06-09 VITALS
DIASTOLIC BLOOD PRESSURE: 87 MMHG | SYSTOLIC BLOOD PRESSURE: 126 MMHG | HEART RATE: 66 BPM | BODY MASS INDEX: 28 KG/M2 | OXYGEN SATURATION: 98 % | WEIGHT: 224 LBS | RESPIRATION RATE: 18 BRPM

## 2020-06-09 PROCEDURE — 99213 OFFICE O/P EST LOW 20 MIN: CPT | Performed by: INTERNAL MEDICINE

## 2020-06-09 ASSESSMENT — ENCOUNTER SYMPTOMS
CHOKING: 1
EYES NEGATIVE: 1
BACK PAIN: 1
APNEA: 1
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
## 2020-06-10 RX ORDER — TRAZODONE HYDROCHLORIDE 50 MG/1
TABLET ORAL
Qty: 15 TABLET | Refills: 0 | Status: SHIPPED | OUTPATIENT
Start: 2020-06-10 | End: 2020-06-22 | Stop reason: SDUPTHER

## 2020-06-18 ENCOUNTER — HOSPITAL ENCOUNTER (OUTPATIENT)
Dept: SLEEP CENTER | Age: 60
Discharge: HOME | End: 2020-06-20
Payer: MEDICARE

## 2020-06-18 PROCEDURE — G0399 HOME SLEEP TEST/TYPE 3 PORTA: HCPCS

## 2020-06-22 ENCOUNTER — TELEPHONE (OUTPATIENT)
Dept: FAMILY MEDICINE CLINIC | Age: 60
End: 2020-06-22

## 2020-06-22 RX ORDER — TRAZODONE HYDROCHLORIDE 50 MG/1
TABLET ORAL
Qty: 15 TABLET | Refills: 0 | Status: SHIPPED | OUTPATIENT
Start: 2020-06-22 | End: 2020-07-23

## 2020-06-22 RX ORDER — IBUPROFEN 800 MG/1
800 TABLET ORAL DAILY
Qty: 30 TABLET | Refills: 0 | Status: SHIPPED | OUTPATIENT
Start: 2020-06-22 | End: 2020-07-23

## 2020-06-22 RX ORDER — MONTELUKAST SODIUM 10 MG/1
10 TABLET ORAL NIGHTLY
Qty: 30 TABLET | Refills: 1 | Status: SHIPPED | OUTPATIENT
Start: 2020-06-22 | End: 2020-07-24 | Stop reason: SDUPTHER

## 2020-06-22 RX ORDER — SILDENAFIL 50 MG/1
100 TABLET, FILM COATED ORAL DAILY PRN
Qty: 60 TABLET | Refills: 0 | Status: SHIPPED | OUTPATIENT
Start: 2020-06-22 | End: 2021-04-28

## 2020-06-24 ENCOUNTER — TELEMEDICINE (OUTPATIENT)
Dept: FAMILY MEDICINE CLINIC | Age: 60
End: 2020-06-24
Payer: COMMERCIAL

## 2020-06-24 LAB — STATUS: NORMAL

## 2020-06-24 PROCEDURE — 99214 OFFICE O/P EST MOD 30 MIN: CPT | Performed by: FAMILY MEDICINE

## 2020-06-24 ASSESSMENT — ENCOUNTER SYMPTOMS
APNEA: 0
PHOTOPHOBIA: 0
TROUBLE SWALLOWING: 1
SHORTNESS OF BREATH: 0
EYE PAIN: 0
CHEST TIGHTNESS: 0
ABDOMINAL PAIN: 0
VOMITING: 0
FACIAL SWELLING: 0
CONSTIPATION: 0
SORE THROAT: 0
NAUSEA: 0
WHEEZING: 0
SINUS PRESSURE: 0
EYE DISCHARGE: 0
BACK PAIN: 1
ABDOMINAL DISTENTION: 0
DIARRHEA: 0
ANAL BLEEDING: 0
COLOR CHANGE: 0

## 2020-06-24 NOTE — PROGRESS NOTES
Zully Cagle MD, PhD [address] [address]      Kalyn Levy is a 60 y.o. male who presents today for his medical conditions/complaints as noted below. Kalyn Levy is c/o of   Chief Complaint   Patient presents with    Back Pain    Neck Pain     [facility]         HPI:     Neck Pain    This is a chronic problem. The current episode started more than 1 year ago. The problem has been waxing and waning. The pain is associated with a remote injury. The pain is present in the left side. The quality of the pain is described as aching, burning, cramping and shooting. The pain is at a severity of 6/10. The pain is moderate. The symptoms are aggravated by bending, position and twisting. Stiffness is present in the morning. Associated symptoms include numbness (C 4-5 distal distribution bilaterally left>right and L 4-5 left), trouble swallowing and weakness. Pertinent negatives include no chest pain, fever or photophobia. Back Pain   This is a chronic problem. The current episode started more than 1 year ago. The problem occurs constantly. The pain is present in the lumbar spine, gluteal and sacro-iliac. The quality of the pain is described as aching, burning, cramping, shooting and stabbing. The pain radiates to the left thigh and left knee. The pain is at a severity of 5/10. The pain is moderate. The pain is the same all the time. The symptoms are aggravated by standing, position, bending and twisting. Stiffness is present all day. Associated symptoms include numbness (C 4-5 distal distribution bilaterally left>right and L 4-5 left) and weakness. Pertinent negatives include no abdominal pain, chest pain or fever.        No results found for: LABA1C          ( goal A1C is < 7)   No results found for: LABMICR  No results found for: LDLCHOLESTEROL, LDLCALC    (goal LDL is <100)   BUN (mg/dL)   Date Value   12/30/2015 13     BP intervertebral disc     2. Lumbar sprain, initial encounter     3. Lumbosacral radiculopathy at L5     4. Deep vein thrombosis of peroneal vein, right     5. Postconcussion syndrome     6. Displacement of intervertebral disc at C4-C5 level     7. Acute embolism and thrombosis of deep vein of right lower extremity (HCC)     8. Arthritis of elbow, left                   Plan:    Ongoing psychological assessment prior to pain stimulator implantation  Fall precautions  The current medical regimen is effective;  continue present plan and medications. Not able to be involved into gainful employment activities x next 1 year      Yumiko Wong is a 60 y.o. male being evaluated by a Virtual Visit (video visit) encounter to address concerns as mentioned above. A caregiver was present when appropriate. Due to this being a TeleHealth encounter (During WQDXF-85 public health emergency), evaluation of the following organ systems was limited: Vitals/Constitutional/EENT/Resp/CV/GI//MS/Neuro/Skin/Heme-Lymph-Imm. Pursuant to the emergency declaration under the [address] [place] [address] and the [place] and [employer] Act, this Virtual Visit was conducted with patient's (and/or legal guardian's) consent, to reduce the patient's risk of exposure to COVID-19 and provide necessary medical care. The patient (and/or legal guardian) has also been advised to contact this office for worsening conditions or problems, and seek emergency medical treatment and/or call 911 if deemed necessary. Patient identification was verified at the start of the visit: YES    Total time spent for this encounter: 30 minutes    Services were provided through a video synchronous discussion virtually to substitute for in-person clinic visit. Patient and provider were located at their individual homes.     --Lona Boyer MD on 6/24/2020 at 9:33 AM    An electronic signature was used to authenticate this note. Return in about 1 month (around 7/24/2020) for follow up. No orders of the defined types were placed in this encounter. Patient given educational materials - see patient instructions. Discussed use, benefit,and side effects of prescribed medications. All patient questions answered. Pt voiced understanding. Reviewed health maintenance. Instructed to continue current medications, diet and exercise. Patient agreed withtreatment plan. Follow up as directed.      Electronically signed by Liliana Machado MD on 6/24/2020 at 9:31 AM

## 2020-07-13 ENCOUNTER — TELEPHONE (OUTPATIENT)
Dept: FAMILY MEDICINE CLINIC | Age: 60
End: 2020-07-13

## 2020-07-14 RX ORDER — AZITHROMYCIN 250 MG/1
250 TABLET, FILM COATED ORAL SEE ADMIN INSTRUCTIONS
Qty: 6 TABLET | Refills: 0 | Status: SHIPPED | OUTPATIENT
Start: 2020-07-14 | End: 2020-07-19

## 2020-07-23 RX ORDER — IBUPROFEN 800 MG/1
TABLET ORAL
Qty: 30 TABLET | Refills: 0 | Status: SHIPPED | OUTPATIENT
Start: 2020-07-23 | End: 2020-07-24 | Stop reason: SDUPTHER

## 2020-07-23 RX ORDER — TRAZODONE HYDROCHLORIDE 50 MG/1
TABLET ORAL
Qty: 15 TABLET | Refills: 0 | Status: SHIPPED | OUTPATIENT
Start: 2020-07-23 | End: 2021-01-20 | Stop reason: ALTCHOICE

## 2020-07-23 NOTE — TELEPHONE ENCOUNTER
Frannie Morgan is calling to request a refill on the following medication(s):    Last Visit Date (If Applicable):      Next Visit Date:    2020    Medication Request:  Requested Prescriptions     Pending Prescriptions Disp Refills    ibuprofen (ADVIL;MOTRIN) 800 MG tablet [Pharmacy Med Name: IBUPROFEN 800MG] 30 tablet 0     SiT QD    traZODone (DESYREL) 50 MG tablet [Pharmacy Med Name: TRAZODONE 50MG] 15 tablet 0     SiT QHS PRN

## 2020-07-24 ENCOUNTER — OFFICE VISIT (OUTPATIENT)
Dept: FAMILY MEDICINE CLINIC | Age: 60
End: 2020-07-24
Payer: COMMERCIAL

## 2020-07-24 VITALS
HEART RATE: 91 BPM | DIASTOLIC BLOOD PRESSURE: 84 MMHG | WEIGHT: 229.6 LBS | RESPIRATION RATE: 16 BRPM | BODY MASS INDEX: 28.7 KG/M2 | SYSTOLIC BLOOD PRESSURE: 138 MMHG | OXYGEN SATURATION: 97 %

## 2020-07-24 PROCEDURE — 99214 OFFICE O/P EST MOD 30 MIN: CPT | Performed by: FAMILY MEDICINE

## 2020-07-24 RX ORDER — IBUPROFEN 800 MG/1
TABLET ORAL
Qty: 30 TABLET | Refills: 2 | Status: SHIPPED | OUTPATIENT
Start: 2020-07-24 | End: 2020-09-03 | Stop reason: SDUPTHER

## 2020-07-24 RX ORDER — MONTELUKAST SODIUM 10 MG/1
10 TABLET ORAL NIGHTLY
Qty: 30 TABLET | Refills: 2 | Status: SHIPPED | OUTPATIENT
Start: 2020-07-24 | End: 2020-09-03 | Stop reason: SDUPTHER

## 2020-07-24 ASSESSMENT — ENCOUNTER SYMPTOMS
ANAL BLEEDING: 0
SORE THROAT: 0
NAUSEA: 0
FACIAL SWELLING: 0
CONSTIPATION: 0
PHOTOPHOBIA: 0
COLOR CHANGE: 0
BACK PAIN: 1
TROUBLE SWALLOWING: 0
VOMITING: 0
ABDOMINAL DISTENTION: 0
CHEST TIGHTNESS: 0
WHEEZING: 0
SINUS PRESSURE: 0
ABDOMINAL PAIN: 0
EYE PAIN: 0
APNEA: 0
SHORTNESS OF BREATH: 0
EYE DISCHARGE: 0
DIARRHEA: 0

## 2020-07-24 NOTE — PROGRESS NOTES
Segundo Rios MD, PhD FAA[address] [address]      Latasha Agosto is a 60 y.o. male who presents today for his medical conditions/complaints as noted below. Latasha Agosto is c/o of   Chief Complaint   Patient presents with    Other     [facility]    Back Pain         HPI:     Neck Pain    The current episode started more than 1 year ago. The problem has been waxing and waning. The pain is associated with a remote injury. The pain is present in the right side, left side and midline. The quality of the pain is described as burning, aching, cramping and shooting. The pain is at a severity of 5/10. The pain is moderate. The symptoms are aggravated by bending, position and twisting. Associated symptoms include numbness and weakness. Pertinent negatives include no chest pain, fever, photophobia or trouble swallowing.        No results found for: LABA1C          ( goal A1C is < 7)   No results found for: LABMICR  No results found for: LDLCHOLESTEROL, LDLCALC    (goal LDL is <100)   BUN (mg/dL)   Date Value   12/30/2015 13     BP Readings from Last 3 Encounters:   07/24/20 138/84   06/09/20 126/87   05/29/20 125/72          (goal 120/80)    Past Medical History:   Diagnosis Date    Acute asthmatic bronchitis     Anemia     Hyperplastic anemia yrs ago    Anesthesia complication     \"angry after surgery can pull things out\"    Congestion and hemorrhage of prostate     Contusion of back(922.31)     wc    DDD (degenerative disc disease)     H/O cardiac catheterization     no stent    Hx of blood clots     after last ankle surgery    Impaired fasting glucose     Impotence of organic origin     wc    Leaky heart valve     \"slight\"    Major depressive disorder, single episode, moderate (HCC)     wc    Migraine with aura, with intractable migraine, so stated, without mention of status migrainosus     wc   nausea and vominting daily patient on zofran     Other disorders of bone and cartilage(733.99)     wc    Persistent cough     finished both antibiotics    PONV (postoperative nausea and vomiting)     Postconcussion syndrome     wc    Primary insomnia     Risk for falls     Self-catheterizes urinary bladder     Shortness of breath     Sprain of ankle, unspecified site     wc    Sprain of foot, unspecified site     wc    Sprain of lumbar region     wc    Unspecified urinary incontinence     wc      Past Surgical History:   Procedure Laterality Date    ACHILLES TENDON SURGERY Right 11/19/2015    lengthing    CERVICAL FUSION      C4-5    COLONOSCOPY      ELBOW SURGERY Left     ENDOSCOPY, COLON, DIAGNOSTIC      FOOT SURGERY Bilateral     FOOT SURGERY Right 03/30/2017    right achilles revision    FRACTURE SURGERY Left     elbow  fall three months ago       GASTRIC FUNDOPLICATION      LEG SURGERY Bilateral     MS DEEP DISSEC FOOT INFEC,1 BURSA Right 3/30/2017    RIGHT ACHILLES HYPERTROPHIC SCAR REVISION WITH EXCISION & ROTATIONAL FLAP  WITH PRP(CELLSAVER)  & CLARIX Fannie Malou GRAFT  performed by Marijane Prader, MD at [address] [place] [address]         No family history on file.     Social History     Tobacco Use    Smoking status: Never Smoker    Smokeless tobacco: Never Used   Substance Use Topics    Alcohol use: No      Current Outpatient Medications   Medication Sig Dispense Refill    montelukast (SINGULAIR) 10 MG tablet Take 1 tablet by mouth nightly 30 tablet 2    ibuprofen (ADVIL;MOTRIN) 800 MG tablet 1T QD 30 tablet 2    traZODone (DESYREL) 50 MG tablet 1T QHS PRN 15 tablet 0    sildenafil (VIAGRA) 50 MG tablet Take 2 tablets by mouth daily as needed for Erectile Dysfunction 60 tablet 0    diclofenac sodium (VOLTAREN) 1 % GEL Apply 2 g topically 4 times daily 6 Tube 1    traZODone (DESYREL) 50 MG tablet take 1 tablet by mouth every evening if needed for sleep 30 tablet 0    montelukast (SINGULAIR) 10 MG tablet One daily 30 tablet 5    oxyCODONE-acetaminophen (PERCOCET) 5-325 MG per tablet take 1 tablet by mouth once daily if needed      ondansetron (ZOFRAN-ODT) 8 MG TBDP disintegrating tablet dissolve 1 tablet under the tongue twice a day if needed      levothyroxine (SYNTHROID) 50 MCG tablet take 1 tablet by mouth every morning ON AN EMPTY STOMACH      topiramate (TOPAMAX) 25 MG tablet take 1 tablet by mouth twice a day 60 tablet 3    amitriptyline (ELAVIL) 10 MG tablet take 1 tablet by mouth at bedtime for 1 week then take 2 tablets by mouth at bedtime THEREAFTER      predniSONE (DELTASONE) 10 MG tablet Take 2 tab a day x 5 days then 1 tab a day x 5 days 15 tablet 0    predniSONE (DELTASONE) 10 MG tablet Take 10 mg for 10 days 10 tablet 0    Misc. Devices MISC 1 each by Does not apply route once for 1 dose 1 Device 0    predniSONE (DELTASONE) 10 MG tablet Take 30 mg for 4 days, 20 mg for 4 days, 10 mg for 4 days 24 tablet 0    Misc. Devices MISC 1 each by Does not apply route once for 1 dose 1 Device 0    magnesium oxide (MAG-OX) 400 (240 Mg) MG tablet take 1 tablet by mouth once daily 30 tablet 3    mometasone (ELOCON) 0.1 % cream Apply topically daily.  45 Tube 0    fluconazole (DIFLUCAN) 200 MG tablet Take one tablet QOD for 5 days 14 tablet 0    promethazine (PHENERGAN) 25 MG tablet Take 25 mg by mouth every 8 hours as needed for Nausea      VIAGRA 50 MG tablet   1    AFLURIA PRESERVATIVE FREE 0.5 ML JANA injection inject 0.5 milliliter intramuscularly  0    butalbital-acetaminophen-caffeine (FIORICET, ESGIC) -40 MG per tablet   0    mirtazapine (REMERON) 45 MG tablet Take 45 mg by mouth nightly   0    baclofen (LIORESAL) 10 MG tablet Take 10 mg by mouth as needed   0    warfarin (COUMADIN) 5 MG tablet   0    cyclobenzaprine (FLEXERIL) 5 MG tablet Take 5 mg by mouth daily as needed   0    DEXILANT 60 MG CPDR capsule Take 60 mg by mouth daily   0    diazepam (VALIUM) 5 MG tablet Take 5 mg by mouth as needed noon  0    LYRICA 50 MG capsule Take 100 mg by mouth daily   0     No current facility-administered medications for this visit. Allergies   Allergen Reactions    Rivaroxaban Shortness Of Breath and Other (See Comments)    Asa [Aspirin] Other (See Comments)     intolerance    Iv Dye [Iodides]      Allergy to Car Cath Dye (from free text allergy report).  Penicillins     Sulfa Antibiotics Other (See Comments)       Health Maintenance   Topic Date Due    TSH testing  1960    Hepatitis C screen  1960    A1C test (Diabetic or Prediabetic)  10/29/1969    HIV screen  10/29/1974    DTaP/Tdap/Td vaccine (1 - Tdap) 10/29/1978    Lipid screen  10/29/1999    Shingles Vaccine (1 of 2) 10/29/2009    Flu vaccine (1) 09/01/2020    Colon cancer screen colonoscopy  10/11/2028    Pneumococcal 0-64 years Vaccine  Completed    Hepatitis A vaccine  Aged Out    Hepatitis B vaccine  Aged Out    Hib vaccine  Aged Out    Meningococcal (ACWY) vaccine  Aged Out       Subjective:      Review of Systems   Constitutional: Positive for fatigue. Negative for fever and unexpected weight change. HENT: Negative for congestion, ear discharge, facial swelling, sinus pressure, sore throat and trouble swallowing. Eyes: Negative for photophobia, pain and discharge. Respiratory: Negative for apnea, chest tightness, shortness of breath and wheezing. Cardiovascular: Negative for chest pain and palpitations. Gastrointestinal: Negative for abdominal distention, abdominal pain, anal bleeding, constipation, diarrhea, nausea and vomiting. Endocrine: Negative for cold intolerance, heat intolerance, polydipsia, polyphagia and polyuria. Genitourinary: Positive for difficulty urinating. Negative for flank pain, frequency and hematuria. Musculoskeletal: Positive for arthralgias, back pain, gait problem, joint swelling, neck pain and neck stiffness. Skin: Negative for color change and rash. Neurological: Positive for weakness, light-headedness and numbness. Negative for dizziness, syncope, facial asymmetry and speech difficulty. Hematological: Negative for adenopathy. Psychiatric/Behavioral: Positive for dysphoric mood and sleep disturbance. Negative for agitation, behavioral problems, confusion, hallucinations and suicidal ideas. The patient is nervous/anxious. The patient is not hyperactive. Objective:     Physical Exam  Vitals signs and nursing note reviewed. Constitutional:       General: He is not in acute distress. Appearance: He is well-developed. HENT:      Head: Normocephalic. Neck:      Musculoskeletal: Normal range of motion and neck supple. Thyroid: No thyromegaly. Cardiovascular:      Rate and Rhythm: Normal rate and regular rhythm. Heart sounds: Normal heart sounds. No murmur. Pulmonary:      Breath sounds: Normal breath sounds. No wheezing or rales. Chest:      Chest wall: No tenderness. Abdominal:      General: Bowel sounds are normal. There is no distension. Palpations: Abdomen is soft. There is no mass. Tenderness: There is no abdominal tenderness. There is no guarding or rebound. Musculoskeletal:         General: Tenderness and deformity present. Left elbow: He exhibits decreased range of motion and deformity. Tenderness found. Right ankle: He exhibits decreased range of motion. Tenderness. Cervical back: He exhibits decreased range of motion and tenderness. Lumbar back: He exhibits decreased range of motion and tenderness. Back:    Lymphadenopathy:      Cervical: No cervical adenopathy. Skin:     General: Skin is warm. Findings: No rash. Neurological:      Mental Status: He is alert and oriented to person, place, and time. Psychiatric:         Attention and Perception: Attention and perception normal.         Mood and Affect: Mood is depressed. Affect is flat.          Speech: Speech normal. Behavior: Behavior normal. Behavior is cooperative. Comments: Not suicidal       /84   Pulse 91   Resp 16   Wt 229 lb 9.6 oz (104.1 kg)   SpO2 97%   BMI 28.70 kg/m²     Assessment:       Diagnosis Orders   1. Degeneration of cervical intervertebral disc     2. Major depressive disorder, single episode, moderate (HCC)  montelukast (SINGULAIR) 10 MG tablet   3. Lumbosacral radiculopathy at L5  montelukast (SINGULAIR) 10 MG tablet    ibuprofen (ADVIL;MOTRIN) 800 MG tablet   4. Deep vein thrombosis of peroneal vein, right     5. Postconcussion syndrome     6. Displacement of intervertebral disc at C4-C5 level     7. Acute embolism and thrombosis of deep vein of right lower extremity (HCC)     8. Arthritis of elbow, left     9. Diffuse myofascial pain syndrome     10. Arthritis of left elbow                   Plan:     The current medical regimen is effective;  continue present plan and medications. Analgesic pump under consideration (Pain Management)  Fall precautions  Dr Crooks Favors opinion pending  Return in about 2 months (around 9/24/2020) for follow up. No orders of the defined types were placed in this encounter. Patient given educational materials - see patient instructions. Discussed use, benefit,and side effects of prescribed medications. All patient questions answered. Pt voiced understanding. Reviewed health maintenance. Instructed to continue current medications, diet and exercise. Patient agreed withtreatment plan. Follow up as directed.      Electronically signed by David Morataya MD on 7/24/2020 at 2:47 PM

## 2020-07-28 ENCOUNTER — NURSE ONLY (OUTPATIENT)
Dept: FAMILY MEDICINE CLINIC | Age: 60
End: 2020-07-28

## 2020-09-03 ENCOUNTER — OFFICE VISIT (OUTPATIENT)
Dept: FAMILY MEDICINE CLINIC | Age: 60
End: 2020-09-03
Payer: COMMERCIAL

## 2020-09-03 ENCOUNTER — HOSPITAL ENCOUNTER (OUTPATIENT)
Age: 60
Setting detail: SPECIMEN
Discharge: HOME | End: 2020-09-03
Payer: COMMERCIAL

## 2020-09-03 VITALS
HEART RATE: 71 BPM | DIASTOLIC BLOOD PRESSURE: 85 MMHG | HEIGHT: 75 IN | SYSTOLIC BLOOD PRESSURE: 139 MMHG | TEMPERATURE: 98.4 F | WEIGHT: 230 LBS | OXYGEN SATURATION: 97 % | BODY MASS INDEX: 28.6 KG/M2

## 2020-09-03 PROCEDURE — 99214 OFFICE O/P EST MOD 30 MIN: CPT | Performed by: FAMILY MEDICINE

## 2020-09-03 RX ORDER — MONTELUKAST SODIUM 10 MG/1
10 TABLET ORAL NIGHTLY
Qty: 30 TABLET | Refills: 2 | Status: SHIPPED | OUTPATIENT
Start: 2020-09-03 | End: 2021-01-20 | Stop reason: SDUPTHER

## 2020-09-03 RX ORDER — HYDROXYZINE PAMOATE 25 MG/1
CAPSULE ORAL
COMMUNITY
Start: 2020-08-14 | End: 2021-01-20

## 2020-09-03 RX ORDER — OXYCODONE HYDROCHLORIDE AND ACETAMINOPHEN 5; 325 MG/1; MG/1
1 TABLET ORAL 2 TIMES DAILY
Qty: 60 TABLET | Refills: 0 | Status: SHIPPED | OUTPATIENT
Start: 2020-09-03 | End: 2020-10-03

## 2020-09-03 RX ORDER — ALFUZOSIN HYDROCHLORIDE 10 MG/1
10 TABLET, EXTENDED RELEASE ORAL
COMMUNITY
Start: 2020-06-03 | End: 2021-08-18

## 2020-09-03 RX ORDER — IBUPROFEN 800 MG/1
TABLET ORAL
Qty: 30 TABLET | Refills: 2 | Status: SHIPPED | OUTPATIENT
Start: 2020-09-03 | End: 2021-01-20 | Stop reason: SDUPTHER

## 2020-09-04 LAB
AMPHETAMINE SCREEN URINE: NEGATIVE
BARBITURATE SCREEN URINE: NEGATIVE
BENZODIAZEPINE SCREEN, URINE: NEGATIVE
BUPRENORPHINE URINE: NORMAL
CANNABINOID SCREEN URINE: NEGATIVE
COCAINE METABOLITE, URINE: NEGATIVE
MDMA URINE: NORMAL
METHADONE SCREEN, URINE: NEGATIVE
METHAMPHETAMINE, URINE: NORMAL
OPIATES, URINE: NEGATIVE
OXYCODONE SCREEN URINE: NEGATIVE
PHENCYCLIDINE, URINE: NEGATIVE
PROPOXYPHENE, URINE: NORMAL
TEST INFORMATION: NORMAL
TRICYCLIC ANTIDEPRESSANTS, UR: NORMAL

## 2020-09-08 ASSESSMENT — ENCOUNTER SYMPTOMS
NAUSEA: 0
BACK PAIN: 1
FACIAL SWELLING: 0
DIARRHEA: 0
TROUBLE SWALLOWING: 0
PHOTOPHOBIA: 0
EYE PAIN: 0
VOMITING: 0
WHEEZING: 0
SHORTNESS OF BREATH: 0
COLOR CHANGE: 0
SORE THROAT: 0
ANAL BLEEDING: 0
APNEA: 0
CONSTIPATION: 0
CHEST TIGHTNESS: 0
ABDOMINAL PAIN: 0
ABDOMINAL DISTENTION: 0
SINUS PRESSURE: 0
EYE DISCHARGE: 0

## 2020-09-08 NOTE — PROGRESS NOTES
not apply route once for 1 dose 1 Device 0    Misc. Devices MISC 1 each by Does not apply route once for 1 dose 1 Device 0    AFLURIA PRESERVATIVE FREE 0.5 ML JANA injection inject 0.5 milliliter intramuscularly  0     No current facility-administered medications for this visit. Allergies   Allergen Reactions    Rivaroxaban Shortness Of Breath and Other (See Comments)    Asa [Aspirin] Other (See Comments)     intolerance    Iv Dye [Iodides]      Allergy to Car Cath Dye (from free text allergy report).  Penicillins     Sulfa Antibiotics Other (See Comments)       Health Maintenance   Topic Date Due    TSH testing  1960    Hepatitis C screen  1960    A1C test (Diabetic or Prediabetic)  10/29/1969    HIV screen  10/29/1974    DTaP/Tdap/Td vaccine (1 - Tdap) 10/29/1978    Lipid screen  10/29/1999    Shingles Vaccine (1 of 2) 10/29/2009    Flu vaccine (1) 09/01/2020    Colon cancer screen colonoscopy  10/11/2028    Pneumococcal 0-64 years Vaccine  Completed    Hepatitis A vaccine  Aged Out    Hepatitis B vaccine  Aged Out    Hib vaccine  Aged Out    Meningococcal (ACWY) vaccine  Aged Out       Subjective:      Review of Systems   Constitutional: Positive for fatigue. Negative for fever and unexpected weight change. HENT: Negative for congestion, ear discharge, facial swelling, sinus pressure, sore throat and trouble swallowing. Eyes: Negative for photophobia, pain and discharge. Respiratory: Negative for apnea, chest tightness, shortness of breath and wheezing. Cardiovascular: Negative for chest pain and palpitations. Gastrointestinal: Negative for abdominal distention, abdominal pain, anal bleeding, constipation, diarrhea, nausea and vomiting. Endocrine: Negative for cold intolerance, heat intolerance, polydipsia, polyphagia and polyuria. Genitourinary: Negative for difficulty urinating, flank pain, frequency and hematuria.    Musculoskeletal: Positive for Cervical: No cervical adenopathy. Skin:     General: Skin is warm. Findings: No rash. Neurological:      Mental Status: He is alert and oriented to person, place, and time. Psychiatric:         Attention and Perception: Attention and perception normal.         Mood and Affect: Mood is anxious and depressed. Speech: Speech normal.         Behavior: Behavior normal. Behavior is cooperative. Thought Content: Thought content normal.         Cognition and Memory: Cognition and memory normal.         Judgment: Judgment normal.       /85   Pulse 71   Temp 98.4 °F (36.9 °C) (Temporal)   Ht 6' 3\" (1.905 m)   Wt 230 lb (104.3 kg)   SpO2 97%   BMI 28.75 kg/m²     Assessment:       Diagnosis Orders   1. Displacement of intervertebral disc at C4-C5 level     2. Lumbosacral radiculopathy at L5  ibuprofen (ADVIL;MOTRIN) 800 MG tablet    montelukast (SINGULAIR) 10 MG tablet    oxyCODONE-acetaminophen (PERCOCET) 5-325 MG per tablet    Urine Drug Screen   3. Major depressive disorder, single episode, moderate (HCC)  montelukast (SINGULAIR) 10 MG tablet    oxyCODONE-acetaminophen (PERCOCET) 5-325 MG per tablet   4. Degeneration of cervical intervertebral disc     5. Lumbar sprain, initial encounter     6. Deep vein thrombosis of peroneal vein, right     7. Postconcussion syndrome     8. Arthritis of elbow, left     9. Diffuse myofascial pain syndrome                   Plan:     The current medical regimen is effective;  continue present plan and medications. Controlled Substance Monitoring:    Acute and Chronic Pain Monitoring:   RX Monitoring 9/8/2020   Periodic Controlled Substance Monitoring No signs of potential drug abuse or diversion identified. Low dose Percocet daily/addictive properties explained/pt agreeable to take a risk which is lower then benefits  Fall precautions    Return in about 1 month (around 10/3/2020) for follow up.     Orders Placed This Encounter   Procedures    Urine Drug Screen     Standing Status:   Future     Number of Occurrences:   1     Standing Expiration Date:   9/3/2021        Patient given educational materials - see patient instructions. Discussed use, benefit,and side effects of prescribed medications. All patient questions answered. Pt voiced understanding. Reviewed health maintenance. Instructed to continue current medications, diet and exercise. Patient agreed withtreatment plan. Follow up as directed.      Electronically signed by Akila Lopez MD on 9/8/2020 at 7:29 PM

## 2021-02-02 PROBLEM — R05.9 COUGH: Status: ACTIVE | Noted: 2021-02-02

## 2021-03-04 PROBLEM — R05.9 COUGH: Status: RESOLVED | Noted: 2021-02-02 | Resolved: 2021-03-04

## 2021-04-28 PROBLEM — I26.02 ACUTE SADDLE PULMONARY EMBOLISM WITH ACUTE COR PULMONALE (HCC): Status: ACTIVE | Noted: 2021-04-28

## 2021-04-28 PROBLEM — G47.30 SLEEP APNEA IN ADULT: Status: ACTIVE | Noted: 2021-04-28

## 2021-05-04 PROBLEM — Z11.59 NEED FOR HEPATITIS C SCREENING TEST: Status: ACTIVE | Noted: 2021-05-04

## 2021-05-04 PROBLEM — Z23 NEED FOR SHINGLES VACCINE: Status: ACTIVE | Noted: 2021-05-04

## 2021-06-03 PROBLEM — Z11.59 NEED FOR HEPATITIS C SCREENING TEST: Status: RESOLVED | Noted: 2021-05-04 | Resolved: 2021-06-03

## 2021-06-14 RX ORDER — DOCUSATE SODIUM 100 MG
CAPSULE ORAL
Qty: 28 CAPSULE | Refills: 0 | Status: SHIPPED | OUTPATIENT
Start: 2021-06-14

## 2021-07-19 PROBLEM — J30.9 ALLERGIC RHINITIS DUE TO ALLERGEN: Status: ACTIVE | Noted: 2021-07-19

## 2021-07-19 PROBLEM — K05.219 PERIODONTAL ABSCESS: Status: ACTIVE | Noted: 2021-07-19

## 2021-07-19 PROBLEM — K13.79 MOUTH PAIN: Status: ACTIVE | Noted: 2021-07-19

## 2021-08-17 PROBLEM — J32.9 RHINOSINUSITIS: Status: ACTIVE | Noted: 2021-08-17

## 2021-08-17 PROBLEM — J31.0 RHINOSINUSITIS: Status: ACTIVE | Noted: 2021-08-17

## 2021-08-28 NOTE — TELEPHONE ENCOUNTER
Patient requesting  something for jock itch, per dr Chen Williamson will call in [phone]
room air

## 2021-09-10 ENCOUNTER — APPOINTMENT (OUTPATIENT)
Dept: CT IMAGING | Facility: CLINIC | Age: 61
End: 2021-09-10
Payer: MEDICARE

## 2021-09-10 ENCOUNTER — HOSPITAL ENCOUNTER (OUTPATIENT)
Facility: CLINIC | Age: 61
Discharge: HOME | End: 2021-09-10
Payer: MEDICARE

## 2021-09-10 ENCOUNTER — HOSPITAL ENCOUNTER (EMERGENCY)
Facility: CLINIC | Age: 61
Discharge: HOME | End: 2021-09-10
Attending: EMERGENCY MEDICINE
Payer: MEDICARE

## 2021-09-10 VITALS
TEMPERATURE: 98 F | BODY MASS INDEX: 26.87 KG/M2 | WEIGHT: 215 LBS | HEART RATE: 62 BPM | DIASTOLIC BLOOD PRESSURE: 86 MMHG | SYSTOLIC BLOOD PRESSURE: 139 MMHG | RESPIRATION RATE: 16 BRPM | OXYGEN SATURATION: 96 %

## 2021-09-10 DIAGNOSIS — Z11.59 NEED FOR HEPATITIS C SCREENING TEST: ICD-10-CM

## 2021-09-10 DIAGNOSIS — S09.90XA CLOSED HEAD INJURY, INITIAL ENCOUNTER: Primary | ICD-10-CM

## 2021-09-10 DIAGNOSIS — T07.XXXA CONTUSION OF MULTIPLE SITES: ICD-10-CM

## 2021-09-10 DIAGNOSIS — R53.82 CHRONIC FATIGUE: ICD-10-CM

## 2021-09-10 LAB
ABSOLUTE EOS #: 0.1 K/UL (ref 0–0.4)
ABSOLUTE IMMATURE GRANULOCYTE: NORMAL K/UL (ref 0–0.3)
ABSOLUTE LYMPH #: 1.7 K/UL (ref 1–4.8)
ABSOLUTE MONO #: 0.4 K/UL (ref 0.1–1.2)
ALBUMIN SERPL-MCNC: 4.1 G/DL (ref 3.5–5.2)
ALBUMIN SERPL-MCNC: 4.1 G/DL (ref 3.5–5.2)
ALBUMIN/GLOBULIN RATIO: 1.7 (ref 1–2.5)
ALBUMIN/GLOBULIN RATIO: 1.7 (ref 1–2.5)
ALP BLD-CCNC: 88 U/L (ref 40–129)
ALP BLD-CCNC: 88 U/L (ref 40–129)
ALT SERPL-CCNC: 13 U/L (ref 5–41)
ALT SERPL-CCNC: 13 U/L (ref 5–41)
ANION GAP SERPL CALCULATED.3IONS-SCNC: 9 MMOL/L (ref 9–17)
ANION GAP SERPL CALCULATED.3IONS-SCNC: 9 MMOL/L (ref 9–17)
AST SERPL-CCNC: 14 U/L
AST SERPL-CCNC: 14 U/L
BASOPHILS # BLD: 1 % (ref 0–2)
BASOPHILS ABSOLUTE: 0 K/UL (ref 0–0.2)
BILIRUB SERPL-MCNC: 0.5 MG/DL (ref 0.3–1.2)
BILIRUB SERPL-MCNC: 0.5 MG/DL (ref 0.3–1.2)
BUN BLDV-MCNC: 10 MG/DL (ref 8–23)
BUN BLDV-MCNC: 10 MG/DL (ref 8–23)
BUN/CREAT BLD: ABNORMAL (ref 9–20)
BUN/CREAT BLD: ABNORMAL (ref 9–20)
CALCIUM SERPL-MCNC: 9.3 MG/DL (ref 8.6–10.4)
CALCIUM SERPL-MCNC: 9.3 MG/DL (ref 8.6–10.4)
CHLORIDE BLD-SCNC: 110 MMOL/L (ref 98–107)
CHLORIDE BLD-SCNC: 110 MMOL/L (ref 98–107)
CO2: 25 MMOL/L (ref 20–31)
CO2: 25 MMOL/L (ref 20–31)
CREAT SERPL-MCNC: 1 MG/DL (ref 0.7–1.2)
CREAT SERPL-MCNC: 1 MG/DL (ref 0.7–1.2)
DIFFERENTIAL TYPE: NORMAL
EOSINOPHILS RELATIVE PERCENT: 1 % (ref 1–4)
GFR AFRICAN AMERICAN: >60 ML/MIN
GFR AFRICAN AMERICAN: >60 ML/MIN
GFR NON-AFRICAN AMERICAN: >60 ML/MIN
GFR NON-AFRICAN AMERICAN: >60 ML/MIN
GFR SERPL CREATININE-BSD FRML MDRD: ABNORMAL ML/MIN/{1.73_M2}
GLUCOSE BLD-MCNC: 104 MG/DL (ref 70–99)
GLUCOSE BLD-MCNC: 104 MG/DL (ref 70–99)
HCT VFR BLD CALC: 41.2 % (ref 41–53)
HEMOGLOBIN: 14.4 G/DL (ref 13.5–17.5)
IMMATURE GRANULOCYTES: NORMAL %
LYMPHOCYTES # BLD: 30 % (ref 24–44)
MCH RBC QN AUTO: 31 PG (ref 26–34)
MCHC RBC AUTO-ENTMCNC: 34.9 G/DL (ref 31–37)
MCV RBC AUTO: 88.6 FL (ref 80–100)
MONOCYTES # BLD: 7 % (ref 2–11)
NRBC AUTOMATED: NORMAL PER 100 WBC
PDW BLD-RTO: 12.9 % (ref 12.5–15.4)
PLATELET # BLD: 174 K/UL (ref 140–450)
PLATELET ESTIMATE: NORMAL
PMV BLD AUTO: 9.4 FL (ref 6–12)
POTASSIUM SERPL-SCNC: 4.1 MMOL/L (ref 3.7–5.3)
POTASSIUM SERPL-SCNC: 4.1 MMOL/L (ref 3.7–5.3)
RBC # BLD: 4.65 M/UL (ref 4.5–5.9)
RBC # BLD: NORMAL 10*6/UL
SEG NEUTROPHILS: 61 % (ref 36–66)
SEGMENTED NEUTROPHILS ABSOLUTE COUNT: 3.5 K/UL (ref 1.8–7.7)
SODIUM BLD-SCNC: 144 MMOL/L (ref 135–144)
SODIUM BLD-SCNC: 144 MMOL/L (ref 135–144)
TOTAL PROTEIN: 6.5 G/DL (ref 6.4–8.3)
TOTAL PROTEIN: 6.5 G/DL (ref 6.4–8.3)
WBC # BLD: 5.7 K/UL (ref 3.5–11)
WBC # BLD: NORMAL 10*3/UL

## 2021-09-10 PROCEDURE — 80061 LIPID PANEL: CPT

## 2021-09-10 PROCEDURE — 84443 ASSAY THYROID STIM HORMONE: CPT

## 2021-09-10 PROCEDURE — 82306 VITAMIN D 25 HYDROXY: CPT

## 2021-09-10 PROCEDURE — 72125 CT NECK SPINE W/O DYE: CPT

## 2021-09-10 PROCEDURE — 99283 EMERGENCY DEPT VISIT LOW MDM: CPT

## 2021-09-10 PROCEDURE — 84439 ASSAY OF FREE THYROXINE: CPT

## 2021-09-10 PROCEDURE — 70450 CT HEAD/BRAIN W/O DYE: CPT

## 2021-09-10 PROCEDURE — 86803 HEPATITIS C AB TEST: CPT

## 2021-09-10 PROCEDURE — 80053 COMPREHEN METABOLIC PANEL: CPT

## 2021-09-10 PROCEDURE — 36415 COLL VENOUS BLD VENIPUNCTURE: CPT

## 2021-09-10 PROCEDURE — 83036 HEMOGLOBIN GLYCOSYLATED A1C: CPT

## 2021-09-10 PROCEDURE — 96372 THER/PROPH/DIAG INJ SC/IM: CPT

## 2021-09-10 PROCEDURE — 85025 COMPLETE CBC W/AUTO DIFF WBC: CPT

## 2021-09-10 PROCEDURE — 2580000003 HC RX 258: Performed by: EMERGENCY MEDICINE

## 2021-09-10 RX ORDER — SODIUM CHLORIDE 9 MG/ML
1000 INJECTION, SOLUTION INTRAVENOUS CONTINUOUS
Status: DISCONTINUED | OUTPATIENT
Start: 2021-09-10 | End: 2021-09-10 | Stop reason: HOSPADM

## 2021-09-10 RX ADMIN — SODIUM CHLORIDE 1000 ML: 9 INJECTION, SOLUTION INTRAVENOUS at 12:02

## 2021-09-10 ASSESSMENT — PAIN DESCRIPTION - PAIN TYPE: TYPE: CHRONIC PAIN

## 2021-09-10 ASSESSMENT — PAIN DESCRIPTION - FREQUENCY: FREQUENCY: INTERMITTENT

## 2021-09-10 ASSESSMENT — PAIN DESCRIPTION - ONSET: ONSET: ON-GOING

## 2021-09-10 ASSESSMENT — PAIN SCALES - GENERAL: PAINLEVEL_OUTOF10: 10

## 2021-09-10 ASSESSMENT — PAIN DESCRIPTION - LOCATION: LOCATION: HEAD;NECK

## 2021-09-10 ASSESSMENT — PAIN - FUNCTIONAL ASSESSMENT: PAIN_FUNCTIONAL_ASSESSMENT: PREVENTS OR INTERFERES SOME ACTIVE ACTIVITIES AND ADLS

## 2021-09-10 ASSESSMENT — PAIN DESCRIPTION - DESCRIPTORS: DESCRIPTORS: ACHING

## 2021-09-10 ASSESSMENT — PAIN DESCRIPTION - PROGRESSION: CLINICAL_PROGRESSION: NOT CHANGED
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
## 2021-09-10 NOTE — ED TRIAGE NOTES
Pt to ED with multiple complaints. Pt states today's visit is related to a fall and states falls are related to an old work injury requiring multiple neck surgeries. Pt states he has been struggling with migraine headaches and is due for Botox next week. Pt states today he was dizzy from lying down because of his neck pain. Pt states he got out of bed to vomit and fell from standing and landed on his left side. Pt does endorse hitting his head and left shoulder on hard floor. Pt drove himself here and was getting outpatient labs prior to checking into ED.

## 2021-09-11 LAB
CHOLESTEROL/HDL RATIO: 2.8
CHOLESTEROL: 127 MG/DL
ESTIMATED AVERAGE GLUCOSE: 105 MG/DL
HBA1C MFR BLD: 5.3 % (ref 4–6)
HDLC SERPL-MCNC: 45 MG/DL
HEPATITIS C ANTIBODY: NONREACTIVE
LDL CHOLESTEROL: 64 MG/DL (ref 0–130)
THYROXINE, FREE: 1.22 NG/DL (ref 0.93–1.7)
TRIGL SERPL-MCNC: 90 MG/DL
TSH SERPL DL<=0.05 MIU/L-ACNC: 2.13 MIU/L (ref 0.3–5)
VITAMIN D 25-HYDROXY: 36.3 NG/ML (ref 30–100)
VLDLC SERPL CALC-MCNC: NORMAL MG/DL (ref 1–30)

## 2021-11-04 ENCOUNTER — HOSPITAL ENCOUNTER (OUTPATIENT)
Age: 61
Setting detail: OUTPATIENT SURGERY
Discharge: HOME | End: 2021-11-04
Attending: INTERNAL MEDICINE | Admitting: INTERNAL MEDICINE
Payer: MEDICARE

## 2021-11-04 ENCOUNTER — ANESTHESIA (OUTPATIENT)
Dept: OPERATING ROOM | Age: 61
End: 2021-11-04
Payer: MEDICARE

## 2021-11-04 ENCOUNTER — ANESTHESIA EVENT (OUTPATIENT)
Dept: OPERATING ROOM | Age: 61
End: 2021-11-04
Payer: MEDICARE

## 2021-11-04 VITALS
BODY MASS INDEX: 27.98 KG/M2 | DIASTOLIC BLOOD PRESSURE: 72 MMHG | HEART RATE: 60 BPM | RESPIRATION RATE: 14 BRPM | WEIGHT: 225 LBS | SYSTOLIC BLOOD PRESSURE: 123 MMHG | TEMPERATURE: 98.6 F | HEIGHT: 75 IN | OXYGEN SATURATION: 99 %

## 2021-11-04 VITALS — DIASTOLIC BLOOD PRESSURE: 58 MMHG | OXYGEN SATURATION: 99 % | SYSTOLIC BLOOD PRESSURE: 107 MMHG

## 2021-11-04 PROCEDURE — 88305 TISSUE EXAM BY PATHOLOGIST: CPT

## 2021-11-04 PROCEDURE — 3700000001 HC ADD 15 MINUTES (ANESTHESIA): Performed by: INTERNAL MEDICINE

## 2021-11-04 PROCEDURE — 2709999900 HC NON-CHARGEABLE SUPPLY: Performed by: INTERNAL MEDICINE

## 2021-11-04 PROCEDURE — 6360000002 HC RX W HCPCS: Performed by: NURSE ANESTHETIST, CERTIFIED REGISTERED

## 2021-11-04 PROCEDURE — 2580000003 HC RX 258: Performed by: NURSE ANESTHETIST, CERTIFIED REGISTERED

## 2021-11-04 PROCEDURE — 3700000000 HC ANESTHESIA ATTENDED CARE: Performed by: INTERNAL MEDICINE

## 2021-11-04 PROCEDURE — 3609012400 HC EGD TRANSORAL BIOPSY SINGLE/MULTIPLE: Performed by: INTERNAL MEDICINE

## 2021-11-04 PROCEDURE — 7100000011 HC PHASE II RECOVERY - ADDTL 15 MIN: Performed by: INTERNAL MEDICINE

## 2021-11-04 PROCEDURE — 7100000010 HC PHASE II RECOVERY - FIRST 15 MIN: Performed by: INTERNAL MEDICINE

## 2021-11-04 PROCEDURE — 87077 CULTURE AEROBIC IDENTIFY: CPT

## 2021-11-04 PROCEDURE — 2500000003 HC RX 250 WO HCPCS: Performed by: NURSE ANESTHETIST, CERTIFIED REGISTERED

## 2021-11-04 PROCEDURE — 2580000003 HC RX 258: Performed by: ANESTHESIOLOGY

## 2021-11-04 PROCEDURE — C1769 GUIDE WIRE: HCPCS | Performed by: INTERNAL MEDICINE

## 2021-11-04 RX ORDER — PROPOFOL 10 MG/ML
INJECTION, EMULSION INTRAVENOUS PRN
Status: DISCONTINUED | OUTPATIENT
Start: 2021-11-04 | End: 2021-11-04 | Stop reason: SDUPTHER

## 2021-11-04 RX ORDER — SODIUM CHLORIDE, SODIUM LACTATE, POTASSIUM CHLORIDE, CALCIUM CHLORIDE 600; 310; 30; 20 MG/100ML; MG/100ML; MG/100ML; MG/100ML
INJECTION, SOLUTION INTRAVENOUS CONTINUOUS
Status: DISCONTINUED | OUTPATIENT
Start: 2021-11-04 | End: 2021-11-04 | Stop reason: HOSPADM

## 2021-11-04 RX ORDER — ONDANSETRON 2 MG/ML
INJECTION INTRAMUSCULAR; INTRAVENOUS PRN
Status: DISCONTINUED | OUTPATIENT
Start: 2021-11-04 | End: 2021-11-04 | Stop reason: SDUPTHER

## 2021-11-04 RX ORDER — 0.9 % SODIUM CHLORIDE 0.9 %
500 INTRAVENOUS SOLUTION INTRAVENOUS
Status: DISCONTINUED | OUTPATIENT
Start: 2021-11-04 | End: 2021-11-04 | Stop reason: HOSPADM

## 2021-11-04 RX ORDER — SODIUM CHLORIDE, SODIUM LACTATE, POTASSIUM CHLORIDE, CALCIUM CHLORIDE 600; 310; 30; 20 MG/100ML; MG/100ML; MG/100ML; MG/100ML
INJECTION, SOLUTION INTRAVENOUS CONTINUOUS PRN
Status: DISCONTINUED | OUTPATIENT
Start: 2021-11-04 | End: 2021-11-04 | Stop reason: SDUPTHER

## 2021-11-04 RX ORDER — PROMETHAZINE HYDROCHLORIDE 25 MG/ML
6.25 INJECTION, SOLUTION INTRAMUSCULAR; INTRAVENOUS
Status: DISCONTINUED | OUTPATIENT
Start: 2021-11-04 | End: 2021-11-04 | Stop reason: HOSPADM

## 2021-11-04 RX ORDER — LIDOCAINE HYDROCHLORIDE 20 MG/ML
INJECTION, SOLUTION EPIDURAL; INFILTRATION; INTRACAUDAL; PERINEURAL PRN
Status: DISCONTINUED | OUTPATIENT
Start: 2021-11-04 | End: 2021-11-04 | Stop reason: SDUPTHER

## 2021-11-04 RX ORDER — ONDANSETRON 2 MG/ML
4 INJECTION INTRAMUSCULAR; INTRAVENOUS
Status: DISCONTINUED | OUTPATIENT
Start: 2021-11-04 | End: 2021-11-04 | Stop reason: HOSPADM

## 2021-11-04 RX ADMIN — PROPOFOL 80 MG: 10 INJECTION, EMULSION INTRAVENOUS at 14:34

## 2021-11-04 RX ADMIN — PROPOFOL 50 MG: 10 INJECTION, EMULSION INTRAVENOUS at 14:40

## 2021-11-04 RX ADMIN — SODIUM CHLORIDE, POTASSIUM CHLORIDE, SODIUM LACTATE AND CALCIUM CHLORIDE: 600; 310; 30; 20 INJECTION, SOLUTION INTRAVENOUS at 13:22

## 2021-11-04 RX ADMIN — PROPOFOL 80 MG: 10 INJECTION, EMULSION INTRAVENOUS at 14:30

## 2021-11-04 RX ADMIN — ONDANSETRON 4 MG: 2 INJECTION, SOLUTION INTRAMUSCULAR; INTRAVENOUS at 14:30

## 2021-11-04 RX ADMIN — SODIUM CHLORIDE, POTASSIUM CHLORIDE, SODIUM LACTATE AND CALCIUM CHLORIDE: 600; 310; 30; 20 INJECTION, SOLUTION INTRAVENOUS at 14:30

## 2021-11-04 RX ADMIN — LIDOCAINE HYDROCHLORIDE 60 MG: 20 INJECTION, SOLUTION EPIDURAL; INFILTRATION; INTRACAUDAL; PERINEURAL at 14:30

## 2021-11-04 ASSESSMENT — PULMONARY FUNCTION TESTS
PIF_VALUE: 1

## 2021-11-04 ASSESSMENT — PAIN - FUNCTIONAL ASSESSMENT: PAIN_FUNCTIONAL_ASSESSMENT: 0-10

## 2021-11-04 ASSESSMENT — PAIN SCALES - GENERAL
PAINLEVEL_OUTOF10: 0

## 2021-11-04 ASSESSMENT — PAIN DESCRIPTION - DESCRIPTORS: DESCRIPTORS: CONSTANT;BURNING

## 2021-11-04 ASSESSMENT — ENCOUNTER SYMPTOMS: SHORTNESS OF BREATH: 1

## 2021-11-04 NOTE — ANESTHESIA POSTPROCEDURE EVALUATION
Department of Anesthesiology  Postprocedure Note    Patient: Tra Gaviria  MRN: [id number]  YOB: 1960  Date of evaluation: 11/4/2021  Time:  4:39 PM     Procedure Summary     Date: 11/04/21 Room / Location: [place] [address] / [facility] - INPATIENT    Anesthesia Start: 1427 Anesthesia Stop: 6812    Procedure: EGD BIOPSY, DILATION (N/A ) Diagnosis: (DX DYSPHAGIA, RUQ PAIN)    Surgeons: Hiram Vanegas MD Responsible Provider: Yazan Jones MD    Anesthesia Type: general, MAC ASA Status: 3          Anesthesia Type: general, MAC    Tereza Phase I:      Tereza Phase II:      Last vitals: Reviewed and per EMR flowsheets.        Anesthesia Post Evaluation    Patient location during evaluation: PACU  Patient participation: complete - patient participated  Level of consciousness: awake  Airway patency: patent  Nausea & Vomiting: no nausea  Complications: no  Cardiovascular status: blood pressure returned to baseline  Respiratory status: acceptable  Hydration status: euvolemic  Comments: Multimodal analgesia pain management as indicated by procedure  Multimodal analgesia pain management approach

## 2021-11-04 NOTE — ANESTHESIA PRE PROCEDURE
Department of Anesthesiology  Preprocedure Note       Name:  Joni Corey   Age:  57 y.o.  :  1960                                          MRN:  [id number]         Date:  2021      Surgeon: Speedy Garcia):  Mandeep Hussein MD    Procedure: Procedure(s):  EGD ESOPHAGOGASTRODUODENOSCOPY    Medications prior to admission:   Prior to Admission medications    Medication Sig Start Date End Date Taking? Authorizing Provider   ibuprofen (ADVIL;MOTRIN) 800 MG tablet TAKE 1 TABLET BY MOUTH EVERY DAY 21   Yany Samuels MD   amitriptyline (ELAVIL) 150 MG tablet Take 1 tablet by mouth daily 21   Historical Provider, MD   clonazePAM (KLONOPIN) 1 MG tablet Take 1 tablet by mouth 2 times daily. 21   Historical Provider, MD   hydrOXYzine (VISTARIL) 25 MG capsule Take 1 capsule by mouth 2 times daily 21   Historical Provider, MD   OLANZapine (ZYPREXA) 5 MG tablet Take 1 tablet by mouth daily 21   Historical Provider, MD   pregabalin (LYRICA) 100 MG capsule Take 1 capsule by mouth 2 times daily.  21   Historical Provider, MD   SENEXON-S 8.6-50 MG per tablet Take 1 tablet by mouth daily 21   Historical Provider, MD   alfuzosin (UROXATRAL) 10 MG extended release tablet Take 1 tablet by mouth daily 21   Historical Provider, MD   STOOL SOFTENER 100 MG capsule TAKE 1 TABLET BY MOUTH EVERY DAY AS NEEDED 21   Yany Samuels MD   montelukast (SINGULAIR) 10 MG tablet Take 1 tablet by mouth nightly 21   Yany Samuels MD   topiramate (TOPAMAX) 25 MG tablet take 1 tablet by mouth twice a day 21   Yany Samuels MD   magnesium oxide (MAG-OX) 400 (240 Mg) MG tablet take 1 tablet by mouth once daily 21   Yany Samuels MD   diclofenac sodium (VOLTAREN) 1 % GEL APPLY 2 GRAMS FOUR TIMES DAILY 20   Aneesh Jesus MD   oxyCODONE-acetaminophen (PERCOCET) 5-325 MG per tablet take 1 tablet by mouth once daily if needed 20   Historical Provider, MD ondansetron (ZOFRAN-ODT) 8 MG TBDP disintegrating tablet dissolve 1 tablet under the tongue twice a day if needed 3/10/20   Historical Provider, MD   levothyroxine (SYNTHROID) 50 MCG tablet take 1 tablet by mouth every morning ON AN EMPTY STOMACH 3/3/20   Historical Provider, MD   mometasone (ELOCON) 0.1 % cream Apply topically daily. 6/7/19   Leigh Nageotte, MD   promethazine (PHENERGAN) 25 MG tablet Take 25 mg by mouth every 8 hours as needed for Nausea    Historical Provider, MD   VIAGRA 50 MG tablet  12/30/16   Historical Provider, MD   butalbital-acetaminophen-caffeine (FIORICET, ESGIC) -70 MG per tablet  5/31/16   Historical Provider, MD   baclofen (LIORESAL) 10 MG tablet Take 10 mg by mouth as needed  2/17/16   Historical Provider, MD   cyclobenzaprine (FLEXERIL) 5 MG tablet Take 5 mg by mouth daily as needed  9/16/15   Historical Provider, MD   DEXILANT 60 MG CPDR capsule Take 60 mg by mouth daily  9/7/15   Historical Provider, MD   diazepam (VALIUM) 5 MG tablet Take 5 mg by mouth as needed noon 7/27/15   Historical Provider, MD       Current medications:    Current Facility-Administered Medications   Medication Dose Route Frequency Provider Last Rate Last Admin    lactated ringers infusion   IntraVENous Continuous Jamie Flores MD           Allergies: Allergies   Allergen Reactions    Rivaroxaban Shortness Of Breath and Other (See Comments)    Asa [Aspirin] Other (See Comments)     intolerance    Iv Dye [Iodides]      Allergy to Car Cath Dye (from free text allergy report). -- pt sts tolerates    Penicillins     Sulfa Antibiotics Other (See Comments)       Problem List:    Patient Active Problem List   Diagnosis Code    Sprain of lumbar region S33. 5XXA    Sprain of foot S93.609A    Sprain of ankle S93.409A    Degeneration of cervical intervertebral disc M50.30    Contusion of back S20.229A    Other disorders of bone and cartilage M94.8X9, M89.8X9    Urinary incontinence R32  Major depressive disorder, single episode, moderate (formerly [facility]) F32.1    Impotence of organic origin N52.9    Intractable migraine with aura G43.119    Postconcussion syndrome F07.81    Displacement of intervertebral disc at C4-C5 level M50.221    Other disorders of continuity of bone, left ankle and foot M84.872    Lesion of left lateral popliteal nerve G57.32    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 9) E66.3    Chronic fatigue R53.82    Abnormal findings diagnostic imaging of heart and coronary circulation R93.1    Acute embolism and thrombosis of deep vein of lower extremity (formerly [facility]) I82.409    Acute intractable headache R51.9    Arrhythmia I49.9    Coronary atherosclerosis I25.10    Frequent falls R29.6    Gait instability R26.81    Hypothyroidism E03.9    Irregular heart beat I49.9    Left arm weakness R29.898    Melena K92.1    Risk factors for obstructive sleep apnea Z91.89    Polyneuropathy G62.9    Other hyperlipidemia E78.49    Other chest pain R07.89    Nonrheumatic aortic valve insufficiency I35.1    Neuropathic pain of both feet G57.93    Shortness of breath R06.02    Sensorineural hearing loss (SNHL), bilateral H90.3    Arthritis of left elbow M19.022    Other fatigue R53.83    Degeneration of intervertebral disc of cervical region M50.30    Gastroesophageal reflux disease K21.9    History of fundoplication M01.681    Idiopathic peripheral neuropathy G60.9    Acute saddle pulmonary embolism with acute cor pulmonale (formerly [facility]) I26.02    Sleep apnea in adult G47.30    Need for shingles vaccine Z23    Allergic rhinitis due to allergen J30.9    Mouth pain K13.79    Periodontal abscess K05.219    Rhinosinusitis J31.0, J32.9       Past Medical History:        Diagnosis Date    Acute asthmatic bronchitis     Anemia     Hyperplastic anemia yrs ago    Anesthesia complication     \"angry after surgery can pull things out\"    Congestion and hemorrhage of prostate     Contusion of back(922.31)     wc    DDD (degenerative disc disease)     H/O cardiac catheterization     no stent    Hx of blood clots     after last ankle surgery    Impaired fasting glucose     Impotence of organic origin     wc    Leaky heart valve     \"slight\"    Major depressive disorder, single episode, moderate (HCC)     wc    Migraine with aura, with intractable migraine, so stated, without mention of status migrainosus     wc   nausea and vominting daily patient on zofran     Other disorders of bone and cartilage(733.99)     wc    Persistent cough     finished both antibiotics    PONV (postoperative nausea and vomiting)     Postconcussion syndrome     wc    Primary insomnia     Risk for falls     Self-catheterizes urinary bladder     Shortness of breath     Sprain of ankle, unspecified site     wc    Sprain of foot, unspecified site     wc    Sprain of lumbar region     wc    Unspecified urinary incontinence     wc       Past Surgical History:        Procedure Laterality Date    ACHILLES TENDON SURGERY Right 11/19/2015    lengthing    CERVICAL FUSION      C4-5    COLONOSCOPY      ELBOW SURGERY Left     ELBOW SURGERY Right     ENDOSCOPY, COLON, DIAGNOSTIC      FOOT SURGERY Bilateral     FOOT SURGERY Right 03/30/2017    right achilles revision    FRACTURE SURGERY Left     elbow  fall three months ago       GASTRIC FUNDOPLICATION      LEG SURGERY Bilateral     MA DEEP DISSEC FOOT INFEC,1 BURSA Right 3/30/2017    RIGHT ACHILLES HYPERTROPHIC SCAR REVISION WITH EXCISION & ROTATIONAL FLAP  WITH PRP(CELLSAVER)  & Vivia Donato Quinton [place] GRAFT  performed by Gena Hernandez MD at [facility] History:    Social History     Tobacco Use    Smoking status: Never Smoker    Smokeless tobacco: Never Used   Substance Use Topics    Alcohol use: No                                Counseling given: Not Answered      Vital Signs (Current):   Vitals:    11/04/21 1253 11/04/21 1306   BP: (!) 141/88    Pulse: 71    Resp: 18    Temp: 97.3 °F (36.3 °C)    TempSrc: Temporal    SpO2: 97%    Weight:  225 lb (102.1 kg)   Height:  6' 3\" (1.905 m)                                              BP Readings from Last 3 Encounters:   11/04/21 (!) 141/88   09/10/21 139/86   08/17/21 126/84       NPO Status:                                                                                 BMI:   Wt Readings from Last 3 Encounters:   11/04/21 225 lb (102.1 kg)   09/10/21 215 lb (97.5 kg)   08/17/21 215 lb 9.6 oz (97.8 kg)     Body mass index is 28.12 kg/m². CBC:   Lab Results   Component Value Date    WBC 5.7 09/10/2021    RBC 4.65 09/10/2021    HGB 14.4 09/10/2021    HCT 41.2 09/10/2021    MCV 88.6 09/10/2021    RDW 12.9 09/10/2021     09/10/2021       CMP:   Lab Results   Component Value Date     09/10/2021    K 4.1 09/10/2021     09/10/2021    CO2 25 09/10/2021    BUN 10 09/10/2021    CREATININE 1.00 09/10/2021    GFRAA >60 09/10/2021    LABGLOM >60 09/10/2021    GLUCOSE 104 09/10/2021    PROT 6.5 09/10/2021    CALCIUM 9.3 09/10/2021    BILITOT 0.50 09/10/2021    ALKPHOS 88 09/10/2021    AST 14 09/10/2021    ALT 13 09/10/2021       POC Tests: No results for input(s): POCGLU, POCNA, POCK, POCCL, POCBUN, POCHEMO, POCHCT in the last 72 hours.     Coags:   Lab Results   Component Value Date    PROTIME 10.6 12/30/2015    INR 1.0 12/30/2015    APTT 30.8 12/30/2015       HCG (If Applicable): No results found for: PREGTESTUR, PREGSERUM, HCG, HCGQUANT     ABGs: No results found for: PHART, PO2ART, QKT6MUG, EIQ0QJA, BEART, I3BKEFKJ     Type & Screen (If Applicable):  No results found for: LABABO, LABRH    Drug/Infectious Status (If Applicable):  Lab Results   Component Value Date    HEPCAB NONREACTIVE 09/10/2021       COVID-19 Screening (If Applicable): No results found for: COVID19        Anesthesia Evaluation  Patient summary reviewed and Nursing notes reviewed   history of anesthetic complications: PONV. Airway: Mallampati: I  TM distance: >3 FB   Neck ROM: full  Mouth opening: > = 3 FB Dental: normal exam         Pulmonary:normal exam  breath sounds clear to auscultation  (+) shortness of breath: chronic and no interval change,  sleep apnea:  asthma:                            Cardiovascular:    (+) CAD:, dysrhythmias:,       ECG reviewed  Rhythm: regular  Rate: normal    Stress test reviewed                Neuro/Psych:   (+) neuromuscular disease:, headaches:, psychiatric history:            GI/Hepatic/Renal:   (+) GERD:,          ROS comment: DYSPHAGIA, RUQ PAIN. Endo/Other:    (+) hypothyroidism::., .                 Abdominal:             Vascular:   + DVT, . Other Findings:           Anesthesia Plan      general     ASA 3     (GA with TIVA)  Induction: intravenous. MIPS: Prophylactic antiemetics administered. Anesthetic plan and risks discussed with patient. Plan discussed with CRNA.                   London Robles MD   11/4/2021

## 2021-11-04 NOTE — OP NOTE
small benign fundic gland polyps are seen throughout scattered in the body of the stomach are benign    Antrum: normal    Duodenum:     Descending: normal    Bulb: normal    The scope was removed and the patient tolerated the procedure well. Recommendations/Plan:   1. F/U Biopsies-if biopsies show evidence of Chávez's esophagus then he definitely needs repeat EGD in 3 years  2. I had a lengthy conversation with his wife indicating that his issues with dysphagia is probably more related to her his previous surgeries but I have no further recommendations other than cutting his food into small portions and chewing properly and washing it down with water . done with repeat EGD or any further testing. 3. F/U In Office in 3-4 weeks  4.  Discussed with the family    Electronically signed by Niecy Knott MD  on 11/4/2021 at 2:49 PM

## 2021-11-04 NOTE — H&P
Interval H&P Note    Pt Name: Dariela Kaplan  MRN: [id number]  YOB: 1960  Date of evaluation: 11/4/2021      [x] I have reviewed the hard copy gastroenterology progress note by Dr. Erik Khoury dated 11/3/2021 labeled in short chart which meets the criteria for an Interval History and Physical note. [x] I have examined  Dariela Kaplan  There are no changes to the patient who is scheduled for a EGD by Dr. Erik Khoury for dysphagia, RUQ pain. Patient has shortness of breath and chest discomfort associated with dysphagia. Patient has difficulty swallowing eating solids and liquids. The patient denies new health changes, fever, chills, wheezing, cough, open sores or wounds. Denies hx diabetes. Last Ibuprofen 11/1/2021. Vital signs: BP (!) 141/88   Pulse 71   Temp 97.3 °F (36.3 °C) (Temporal)   Resp 18   Ht 6' 3\" (1.905 m)   Wt 225 lb (102.1 kg)   SpO2 97%   BMI 28.12 kg/m²     Allergies:  Rivaroxaban, Asa [aspirin], Iv dye [iodides], Penicillins, and Sulfa antibiotics    Medications:    Prior to Admission medications    Medication Sig Start Date End Date Taking? Authorizing Provider   ibuprofen (ADVIL;MOTRIN) 800 MG tablet TAKE 1 TABLET BY MOUTH EVERY DAY 8/24/21   Laura Martinez MD   amitriptyline (ELAVIL) 150 MG tablet Take 1 tablet by mouth daily 6/18/21   Historical Provider, MD   clonazePAM (KLONOPIN) 1 MG tablet Take 1 tablet by mouth 2 times daily. 6/18/21   Historical Provider, MD   hydrOXYzine (VISTARIL) 25 MG capsule Take 1 capsule by mouth 2 times daily 6/18/21   Historical Provider, MD   OLANZapine (ZYPREXA) 5 MG tablet Take 1 tablet by mouth daily 5/24/21   Historical Provider, MD   pregabalin (LYRICA) 100 MG capsule Take 1 capsule by mouth 2 times daily.  6/18/21   Historical Provider, MD   SENEXON-S 8.6-50 MG per tablet Take 1 tablet by mouth daily 6/18/21   Historical Provider, MD   alfuzosin (UROXATRAL) 10 MG extended release tablet Take 1 tablet by mouth daily 6/14/21   Historical Provider, MD   STOOL SOFTENER 100 MG capsule TAKE 1 TABLET BY MOUTH EVERY DAY AS NEEDED 6/14/21   Warren Puentes MD   montelukast (SINGULAIR) 10 MG tablet Take 1 tablet by mouth nightly 5/17/21   Warren Puentes MD   topiramate (TOPAMAX) 25 MG tablet take 1 tablet by mouth twice a day 1/20/21   Warren Puentes MD   magnesium oxide (MAG-OX) 400 (240 Mg) MG tablet take 1 tablet by mouth once daily 1/20/21   Warren Puentes MD   diclofenac sodium (VOLTAREN) 1 % GEL APPLY 2 GRAMS FOUR TIMES DAILY 8/31/20   Reba Muhammad MD   oxyCODONE-acetaminophen (PERCOCET) 5-325 MG per tablet take 1 tablet by mouth once daily if needed 2/5/20   Historical Provider, MD   ondansetron (ZOFRAN-ODT) 8 MG TBDP disintegrating tablet dissolve 1 tablet under the tongue twice a day if needed 3/10/20   Historical Provider, MD   levothyroxine (SYNTHROID) 50 MCG tablet take 1 tablet by mouth every morning ON AN EMPTY STOMACH 3/3/20   Historical Provider, MD   mometasone (ELOCON) 0.1 % cream Apply topically daily.  6/7/19   Reba Muhammad MD   promethazine (PHENERGAN) 25 MG tablet Take 25 mg by mouth every 8 hours as needed for Nausea    Historical Provider, MD   VIAGRA 50 MG tablet  12/30/16   Historical Provider, MD   butalbital-acetaminophen-caffeine (FIORICET, ESGIC) -54 MG per tablet  5/31/16   Historical Provider, MD   baclofen (LIORESAL) 10 MG tablet Take 10 mg by mouth as needed  2/17/16   Historical Provider, MD   cyclobenzaprine (FLEXERIL) 5 MG tablet Take 5 mg by mouth daily as needed  9/16/15   Historical Provider, MD   DEXILANT 60 MG CPDR capsule Take 60 mg by mouth daily  9/7/15   Historical Provider, MD   diazepam (VALIUM) 5 MG tablet Take 5 mg by mouth as needed noon 7/27/15   Historical Provider, MD           Past Medical History:     Past Medical History:   Diagnosis Date    Acute asthmatic bronchitis     Anemia     Hyperplastic anemia yrs ago    Anesthesia complication     \"angry after surgery can pull things out\"    Congestion and hemorrhage of prostate     Contusion of back(922.31)     wc    DDD (degenerative disc disease)     H/O cardiac catheterization     no stent    Hx of blood clots     after last ankle surgery    Impaired fasting glucose     Impotence of organic origin     wc    Leaky heart valve     \"slight\"    Major depressive disorder, single episode, moderate (HCC)     wc    Migraine with aura, with intractable migraine, so stated, without mention of status migrainosus     wc   nausea and vominting daily patient on zofran     Other disorders of bone and cartilage(733.99)     wc    Persistent cough     finished both antibiotics    PONV (postoperative nausea and vomiting)     Postconcussion syndrome     wc    Primary insomnia     Risk for falls     Self-catheterizes urinary bladder     Shortness of breath     Sprain of ankle, unspecified site     wc    Sprain of foot, unspecified site     wc    Sprain of lumbar region     wc    Unspecified urinary incontinence     wc        Past Surgical History:     Past Surgical History:   Procedure Laterality Date    ACHILLES TENDON SURGERY Right 11/19/2015    lengthing    CERVICAL FUSION      C4-5    COLONOSCOPY      ELBOW SURGERY Left     ELBOW SURGERY Right     ENDOSCOPY, COLON, DIAGNOSTIC      FOOT SURGERY Bilateral     FOOT SURGERY Right 03/30/2017    right achilles revision    FRACTURE SURGERY Left     elbow  fall three months ago       GASTRIC FUNDOPLICATION      LEG SURGERY Bilateral     [place] DEEP DISSEC FOOT INFEC,1 BURSA Right 3/30/2017    RIGHT ACHILLES HYPERTROPHIC SCAR REVISION WITH EXCISION & ROTATIONAL FLAP  WITH PRP(CELLSAVER)  & CLARIDALMIS [place] GRAFT  performed by Andrez Gomez MD at [address] [place] [address]         Social History:     Social History     Socioeconomic History    Marital status:      Spouse name: None    Number of children: None    Years of education: None    Highest education level: None Occupational History    None   Tobacco Use    Smoking status: Never Smoker    Smokeless tobacco: Never Used   Vaping Use    Vaping Use: Never used   Substance and Sexual Activity    Alcohol use: No    Drug use: No    Sexual activity: None   Other Topics Concern    None   Social History Narrative    None     Social Determinants of Health     Financial Resource Strain:     Difficulty of Paying Living Expenses:    Food Insecurity:     Worried About Running Out of Food in the Last Year:     Ran Out of Food in the Last Year:    Transportation Needs:     Lack of Transportation (Medical):  Lack of Transportation (Non-Medical):    Physical Activity:     Days of Exercise per Week:     Minutes of Exercise per Session:    Stress:     Feeling of Stress :    Social Connections:     Frequency of Communication with Friends and Family:     Frequency of Social Gatherings with Friends and Family:     Attends Orthodoxy Services:     Active Member of Clubs or Organizations:     Attends Club or Organization Meetings:     Marital Status:    Intimate Partner Violence:     Fear of Current or Ex-Partner:     Emotionally Abused:     Physically Abused:     Sexually Abused:        Family History:     History reviewed. No pertinent family history. This is a 57 y.o. male who is pleasant, cooperative, alert and oriented x3, in no acute distress. Heart: Heart sounds are normal.  HR 71 regular rate and rhythm without murmur, gallop or rub. Lungs: Normal respiratory effort with equal expansion, good air exchange, unlabored and clear to auscultation without wheezes or rales bilaterally   Abdomen: soft, nontender, nondistended with bowel sounds active. Labs:  No results for input(s): HGB, HCT, WBC, MCV, PLT, NA, K, CL, CO2, BUN, CREATININE, GLUCOSE, INR, PROTIME, APTT, AST, ALT, LABALBU, HCG in the last 720 hours. No results for input(s): COVID19 in the last 720 hours.     Radha Avina, APRN - CNP  Electronically signed 11/4/2021 at 1:19 PM

## 2021-11-05 PROBLEM — Z98.890 PONV (POSTOPERATIVE NAUSEA AND VOMITING): Status: ACTIVE | Noted: 2021-11-05

## 2021-11-05 PROBLEM — N31.9 NEUROGENIC BLADDER: Status: ACTIVE | Noted: 2021-10-15

## 2021-11-05 PROBLEM — S20.229A CONTUSION OF BACK: Status: ACTIVE | Noted: 2021-10-13

## 2021-11-05 PROBLEM — M54.2 NECK ACHE: Status: ACTIVE | Noted: 2021-09-20

## 2021-11-05 PROBLEM — N52.9 ED (ERECTILE DYSFUNCTION) OF ORGANIC ORIGIN: Status: ACTIVE | Noted: 2021-10-13

## 2021-11-05 PROBLEM — R11.2 PONV (POSTOPERATIVE NAUSEA AND VOMITING): Status: ACTIVE | Noted: 2021-11-05

## 2021-11-05 LAB
DIRECT EXAM: POSITIVE
Lab: ABNORMAL
SPECIMEN DESCRIPTION: ABNORMAL

## 2021-11-06 PROBLEM — K29.70 HELICOBACTER POSITIVE GASTRITIS: Status: ACTIVE | Noted: 2021-11-06

## 2021-11-06 PROBLEM — B96.81 HELICOBACTER POSITIVE GASTRITIS: Status: ACTIVE | Noted: 2021-11-06

## 2021-11-08 LAB — SURGICAL PATHOLOGY REPORT: NORMAL

## 2021-12-06 PROBLEM — R05.9 COUGH: Status: RESOLVED | Noted: 2021-02-02 | Resolved: 2021-12-06

## 2022-02-21 PROBLEM — A63.0 CONDYLOMA ACUMINATUM IN MALE: Status: ACTIVE | Noted: 2021-12-05

## 2022-02-21 PROBLEM — R07.2 PRECORDIAL PAIN: Status: ACTIVE | Noted: 2017-03-21

## 2022-02-21 PROBLEM — K40.90 INGUINAL HERNIA: Status: ACTIVE | Noted: 2021-11-12

## 2022-02-21 PROBLEM — K40.20 BILATERAL INGUINAL HERNIA WITHOUT OBSTRUCTION OR GANGRENE: Status: ACTIVE | Noted: 2021-11-15

## 2022-02-21 PROBLEM — N41.1 CHRONIC PROSTATITIS: Status: ACTIVE | Noted: 2022-01-26

## 2022-03-08 PROBLEM — E83.51 HYPOCALCEMIA: Status: ACTIVE | Noted: 2022-03-08

## 2022-03-08 PROBLEM — R20.0 NUMBNESS: Status: ACTIVE | Noted: 2022-03-08
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
## 2022-04-05 PROBLEM — G47.33 OBSTRUCTIVE SLEEP APNEA: Status: ACTIVE | Noted: 2021-04-28

## 2022-04-05 PROBLEM — D69.6 THROMBOCYTOPENIA (HCC): Status: ACTIVE | Noted: 2022-04-05
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
## 2022-07-15 RX ORDER — PREGABALIN 150 MG/1
CAPSULE ORAL
Qty: 56 CAPSULE | Refills: 0 | OUTPATIENT
Start: 2022-07-15

## 2022-07-18 RX ORDER — PREGABALIN 150 MG/1
CAPSULE ORAL
Qty: 56 CAPSULE | Refills: 0 | OUTPATIENT
Start: 2022-07-18

## 2022-09-12 ENCOUNTER — HOSPITAL ENCOUNTER (EMERGENCY)
Age: 62
Discharge: HOME | End: 2022-09-12
Attending: EMERGENCY MEDICINE
Payer: MEDICARE

## 2022-09-12 ENCOUNTER — APPOINTMENT (OUTPATIENT)
Dept: GENERAL RADIOLOGY | Age: 62
End: 2022-09-12
Payer: MEDICARE

## 2022-09-12 VITALS
BODY MASS INDEX: 28.01 KG/M2 | WEIGHT: 230 LBS | RESPIRATION RATE: 18 BRPM | SYSTOLIC BLOOD PRESSURE: 133 MMHG | HEART RATE: 80 BPM | OXYGEN SATURATION: 98 % | TEMPERATURE: 97.4 F | HEIGHT: 76 IN | DIASTOLIC BLOOD PRESSURE: 77 MMHG

## 2022-09-12 DIAGNOSIS — S83.91XA SPRAIN OF RIGHT KNEE, UNSPECIFIED LIGAMENT, INITIAL ENCOUNTER: Primary | ICD-10-CM

## 2022-09-12 PROCEDURE — 99283 EMERGENCY DEPT VISIT LOW MDM: CPT

## 2022-09-12 PROCEDURE — 73562 X-RAY EXAM OF KNEE 3: CPT

## 2022-09-12 ASSESSMENT — PAIN - FUNCTIONAL ASSESSMENT: PAIN_FUNCTIONAL_ASSESSMENT: 0-10

## 2022-09-12 ASSESSMENT — PAIN SCALES - GENERAL: PAINLEVEL_OUTOF10: 9

## 2022-09-12 ASSESSMENT — ENCOUNTER SYMPTOMS
COLOR CHANGE: 0
SHORTNESS OF BREATH: 0

## 2022-09-12 NOTE — ED PROVIDER NOTES
[facility] ED  eMERGENCY dEPARTMENT eNCOUnter      Pt Name: Dionicio Okeefe  MRN: [id number]  [place] 1960  Date of evaluation: 9/12/2022  Provider: ALEKSANDRA Quintero [id number]       Chief Complaint   Patient presents with    Fall     Friday, right knee pn         HISTORY OF PRESENT ILLNESS  (Location/Symptom, Timing/Onset, Context/Setting, Quality, Duration, Modifying Factors, Severity.)   Dionicio Okeefe is a 57 y.o. male who presents to the emergency department. States he fell onto his right knee on Friday 9/9/22 onto tile floor. Denies N/T, fever, chills. Reports swelling to the knee. The pain is generalized. He has been applying ice and elevating the knee. Reports previous knee issues/surgery. States he had a migraine at the time he fell. Reports he has workman's comp claims for migraines which makes this a workman's comp claim. Rates his pain 9/10 at this time. Nursing Notes were reviewed. ALLERGIES     Rivaroxaban, Asa [aspirin], Iv dye [iodides], Penicillins, and Sulfa antibiotics    CURRENT MEDICATIONS       Discharge Medication List as of 9/12/2022 10:58 AM        CONTINUE these medications which have NOT CHANGED    Details   calcium carbonate-vitamin D (CALTRATE) 600-400 MG-UNIT TABS per tab Take 2 tablets by mouth daily, Disp-60 tablet, R-2Normal      !! sildenafil (VIAGRA) 100 MG tablet TAKE 1 TABLET BY MOUTH PER DAY AS NEEDED FOR ERECTILE DYSFUNCTION. TAKE 1 HOUR PRIOR TO INTERCOURSEHistorical Med      levothyroxine (SYNTHROID) 50 MCG tablet Take 1 tablet by mouth every morning (before breakfast) Start with half of tablet for 7 days. , Disp-30 tablet, R-0Normal      amitriptyline (ELAVIL) 150 MG tablet Take 1 tablet by mouth dailyHistorical Med      pregabalin (LYRICA) 100 MG capsule Take 1 capsule by mouth 2 times daily. Historical Med      SENEXON-S 8.6-50 MG per tablet Take 1 tablet by mouth daily, DAWHistorical Med      alfuzosin (UROXATRAL) 10 MG extended release tablet Take 1 tablet by mouth dailyHistorical Med      STOOL SOFTENER 100 MG capsule TAKE 1 TABLET BY MOUTH EVERY DAY AS NEEDED, Disp-28 capsule, R-006/11/2021 11:35:54 AMNormal      oxyCODONE-acetaminophen (PERCOCET) 5-325 MG per tablet take 1 tablet by mouth once daily if neededHistorical Med      ondansetron (ZOFRAN-ODT) 8 MG TBDP disintegrating tablet dissolve 1 tablet under the tongue twice a day if neededHistorical Med      promethazine (PHENERGAN) 25 MG tablet Take 25 mg by mouth every 8 hours as needed for NauseaHistorical Med      !! VIAGRA 50 MG tablet R-1, DAW       !! - Potential duplicate medications found. Please discuss with provider.           PAST MEDICAL HISTORY         Diagnosis Date    Acute asthmatic bronchitis     Anemia     Hyperplastic anemia yrs ago    Anesthesia complication     \"angry after surgery can pull things out\"    Congestion and hemorrhage of prostate     Contusion of back(922.31)     wc    DDD (degenerative disc disease)     H/O cardiac catheterization     no stent    Hx of blood clots     after last ankle surgery    Impaired fasting glucose     Impotence of organic origin     wc    Leaky heart valve     \"slight\"    Major depressive disorder, single episode, moderate (HCC)     wc    Migraine with aura, with intractable migraine, so stated, without mention of status migrainosus     wc   nausea and vominting daily patient on zofran     Other disorders of bone and cartilage(573.99)     wc    Persistent cough     finished both antibiotics    PONV (postoperative nausea and vomiting)     Postconcussion syndrome     wc    Primary insomnia     Risk for falls     Self-catheterizes urinary bladder     Shortness of breath     Sprain of ankle, unspecified site     wc    Sprain of foot, unspecified site     wc    Sprain of lumbar region     wc    Unspecified urinary incontinence     wc       SURGICAL HISTORY           Procedure Laterality Date    ACHILLES TENDON SURGERY Right 11/19/2015    lengthing    CERVICAL FUSION      C4-5    COLONOSCOPY      ELBOW SURGERY Left     ELBOW SURGERY Right     ENDOSCOPY, COLON, DIAGNOSTIC      FOOT SURGERY Bilateral     FOOT SURGERY Right 03/30/2017    right achilles revision    FRACTURE SURGERY Left     elbow  fall three months ago       GASTRIC FUNDOPLICATION      LEG SURGERY Bilateral     [place] DEEP DISSEC FOOT INFEC,1 BURSA Right 3/30/2017    RIGHT ACHILLES HYPERTROPHIC SCAR REVISION WITH EXCISION & ROTATIONAL FLAP  WITH PRP(CELLSAVER)  & CLARIX Rosa Elena [place]anker GRAFT  performed by Sanjuanita Shafer MD at [facility] N/A 11/4/2021    EGD BIOPSY, DILATION performed by Becca Ventura MD at [place] [address]     No family history on file. No family status information on file. SOCIAL HISTORY      reports that he has never smoked. He has never used smokeless tobacco. He reports that he does not drink alcohol and does not use drugs. REVIEW OF SYSTEMS    (2-9 systems for level 4, 10 or more for level 5)     Review of Systems   Constitutional:  Negative for chills, diaphoresis and fatigue. Respiratory:  Negative for shortness of breath. Cardiovascular:  Negative for chest pain. Musculoskeletal:  Positive for arthralgias and myalgias. Skin:  Negative for color change, rash and wound. Neurological:  Negative for weakness and numbness. Except as noted above the remainder of the review of systems was reviewed and negative. PHYSICAL EXAM    (up to 7 for level 4, 8 or more for level 5)     ED Triage Vitals [09/12/22 0856]   BP Temp Temp Source Heart Rate Resp SpO2 Height Weight   133/77 97.4 °F (36.3 °C) Oral 80 18 98 % 6' 4\" (1.93 m) 230 lb (104.3 kg)     Physical Exam  Vitals reviewed. Constitutional:       General: He is not in acute distress. Appearance: He is well-developed. He is not diaphoretic. Eyes:      General: No scleral icterus.      Conjunctiva/sclera: Conjunctivae normal.   Cardiovascular:      Rate and Rhythm: Normal rate. Pulses: Normal pulses. Pulmonary:      Effort: Pulmonary effort is normal. No respiratory distress. Breath sounds: No stridor. Musculoskeletal:      Right upper leg: No swelling, deformity or bony tenderness. Right knee: Swelling present. No deformity. Decreased range of motion. Tenderness present. Right lower leg: No swelling, deformity or bony tenderness. Skin:     General: Skin is warm and dry. Capillary Refill: Capillary refill takes less than 2 seconds. Findings: No rash. Neurological:      Mental Status: He is alert and oriented to person, place, and time. Psychiatric:         Behavior: Behavior normal.        DIAGNOSTIC RESULTS     RADIOLOGY:   Non-plain film images such as CT, Ultrasound and MRI are read by the radiologist. Plain radiographic images are visualized and preliminarily interpreted by the emergency physician with the below findings:    Interpretation per the Radiologist below, if available at the time of this note:    XR KNEE RIGHT (3 VIEWS)    Result Date: 9/12/2022  EXAMINATION: THREE XRAY VIEWS OF THE RIGHT KNEE 9/12/2022 9:33 am COMPARISON: None. HISTORY: ORDERING SYSTEM PROVIDED HISTORY: injury TECHNOLOGIST PROVIDED HISTORY: injury Reason for Exam: Pt eval for knee pain s/p fall FINDINGS: No significant suprapatellar joint effusion. No acute fracture. No dislocation. No acute osseous abnormality         EMERGENCY DEPARTMENT COURSE and DIFFERENTIAL DIAGNOSIS/MDM:   Vitals:    Vitals:    09/12/22 0856   BP: 133/77   Pulse: 80   Resp: 18   Temp: 97.4 °F (36.3 °C)   TempSrc: Oral   SpO2: 98%   Weight: 230 lb (104.3 kg)   Height: 6' 4\" (1.93 m)         CLINICAL DECISION MAKING:  The patient presented alert with a nontoxic appearance and was seen in conjunction with Dr. Simonne Baumgarten. Imaging was negative for acute findings. OARRS was reviewed. He should have medication at home for pain.  A knee immobilizer was applied. The application was checked and was appropriate; the RLE remained NVI. Follow up with ortho for a recheck, further evaluation and treatment. Evaluation and treatment course in the ED, and plan of care upon discharge was discussed in length with the patient. Patient had no further questions prior to being discharged and was instructed to return to the ED for new or worsening symptoms. Care was provided during an unprecedented national emergency due to the novel coronavirus, Covid-19. FINAL IMPRESSION      1. Sprain of right knee, unspecified ligament, initial encounter            Problem List  Patient Active Problem List   Diagnosis Code    Sprain of lumbar region S33. 5XXA    Sprain of foot S93.609A    Sprain of ankle S93.409A    Degeneration of cervical intervertebral disc M50.30    Contusion of back S20.229A    Other disorders of bone and cartilage M94.8X9, M89.8X9    Urinary incontinence R32    Major depressive disorder, single episode, moderate ([facility]) F32.1    Erectile dysfunction N52.9    Intractable migraine with aura G43.119    Postconcussion syndrome F07.81    Displacement of intervertebral disc at C4-C5 level M50.221    Other disorders of continuity of bone, left ankle and foot M84.872    Lesion of left lateral popliteal nerve G57.32    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 9) E66.3    Chronic fatigue R53.82    Abnormal findings diagnostic imaging of heart and coronary circulation R93.1    Acute embolism and thrombosis of deep vein of lower extremity ([facility]) I82.409    Acute intractable headache R51.9    Arrhythmia I49.9    Coronary atherosclerosis I25.10    Frequent falls R29.6    Gait instability R26.81    Hypothyroidism E03.9    Irregular heart beat I49.9    Left arm weakness R29.898    Melena K92.1    Risk factors for obstructive sleep apnea Z91.89    Polyneuropathy G62.9    Other hyperlipidemia E78.49    Precordial pain R07.2    Nonrheumatic aortic valve insufficiency I35.1    Neuropathic pain of both feet G57.93    Shortness of breath R06.02    Sensorineural hearing loss (SNHL), bilateral H90.3    Arthritis of left elbow M19.022    Other fatigue R53.83    Degeneration of intervertebral disc of cervical region M50.30    Gastroesophageal reflux disease K21.9    History of fundoplication E16.572    Idiopathic peripheral neuropathy G60.9    Acute saddle pulmonary embolism with acute cor pulmonale (HCC) I26.02    Obstructive sleep apnea G47.33    Need for shingles vaccine Z23    Allergic rhinitis due to allergen J30.9    Mouth pain K13.79    Periodontal abscess K05.219    Rhinosinusitis J31.0, J32.9    Neck ache M54.2    Neurogenic bladder N31.9    PONV (postoperative nausea and vomiting) Y64.0, I93.080    Helicobacter positive gastritis K29.70, B96.81    Chronic prostatitis N41.1    Bilateral inguinal hernia without obstruction or gangrene K40.20    Condyloma acuminatum in male A63.0    Inguinal hernia K40.90    Hypocalcemia E83.51    Numbness R20.0    Thrombocytopenia ([address] D69.6         DISPOSITION/PLAN   DISPOSITION Decision To Discharge 09/12/2022 10:35:54 AM      PATIENT REFERRED TO:   MD Matilda Thompsonso [address]  [place] [address]  [facility], MD  [address] [place] [address]  [address] [place] [id number] [phone]          DISCHARGE MEDICATIONS:     Discharge Medication List as of 9/12/2022 10:58 AM              (Please note that portions of this note were completed with a voice recognition program.  Efforts were made to edit the dictations but occasionally words are mis-transcribed.)    ALEKSANDRA Keys CNP, APRN - CNP  09/12/22 1830

## 2022-09-12 NOTE — ED PROVIDER NOTES
eMERGENCY dEPARTMENT eNCOUnter   Independent Attestation     Pt Name: Raphael Weems  MRN: [id number]  [place] 1960  Date of evaluation: 9/12/22     Raphael Weems is a 57 y.o. male with CC: Fall (Friday, right knee pn)        This visit was performed by both a physician and an APC. I performed all aspects of the MDM as documented.       The care is provided during an unprecedented national emergency due to the novel coronavirus, Kelsey Mcginnis MD  Attending Emergency Physician           Arminda Michaels MD  09/12/22 9685

## 2022-09-13 RX ORDER — PREGABALIN 150 MG/1
CAPSULE ORAL
Qty: 56 CAPSULE | Refills: 0 | OUTPATIENT
Start: 2022-09-13

## 2022-09-14 RX ORDER — PREGABALIN 150 MG/1
CAPSULE ORAL
Qty: 56 CAPSULE | Refills: 0 | OUTPATIENT
Start: 2022-09-14

## 2022-10-12 RX ORDER — PREGABALIN 150 MG/1
CAPSULE ORAL
Qty: 56 CAPSULE | Refills: 0 | OUTPATIENT
Start: 2022-10-12

## 2022-10-14 RX ORDER — PREGABALIN 150 MG/1
CAPSULE ORAL
Qty: 56 CAPSULE | Refills: 0 | OUTPATIENT
Start: 2022-10-14

## 2022-12-07 ENCOUNTER — OFFICE VISIT (OUTPATIENT)
Dept: PRIMARY CARE CLINIC | Age: 62
End: 2022-12-07

## 2022-12-07 VITALS
OXYGEN SATURATION: 96 % | SYSTOLIC BLOOD PRESSURE: 123 MMHG | TEMPERATURE: 97.8 F | HEART RATE: 66 BPM | DIASTOLIC BLOOD PRESSURE: 74 MMHG

## 2022-12-07 DIAGNOSIS — H66.005 RECURRENT ACUTE SUPPURATIVE OTITIS MEDIA WITHOUT SPONTANEOUS RUPTURE OF LEFT TYMPANIC MEMBRANE: Primary | ICD-10-CM

## 2022-12-07 RX ORDER — LEVOFLOXACIN 500 MG/1
500 TABLET, FILM COATED ORAL DAILY
Qty: 7 TABLET | Refills: 0 | Status: SHIPPED | OUTPATIENT
Start: 2022-12-07 | End: 2022-12-14

## 2022-12-07 RX ORDER — PREDNISONE 20 MG/1
40 TABLET ORAL DAILY
Qty: 10 TABLET | Refills: 0 | Status: SHIPPED | OUTPATIENT
Start: 2022-12-07 | End: 2022-12-12

## 2022-12-07 ASSESSMENT — PATIENT HEALTH QUESTIONNAIRE - PHQ9
SUM OF ALL RESPONSES TO PHQ QUESTIONS 1-9: 20
4. FEELING TIRED OR HAVING LITTLE ENERGY: 3
1. LITTLE INTEREST OR PLEASURE IN DOING THINGS: 0
10. IF YOU CHECKED OFF ANY PROBLEMS, HOW DIFFICULT HAVE THESE PROBLEMS MADE IT FOR YOU TO DO YOUR WORK, TAKE CARE OF THINGS AT HOME, OR GET ALONG WITH OTHER PEOPLE: 2
3. TROUBLE FALLING OR STAYING ASLEEP: 3
8. MOVING OR SPEAKING SO SLOWLY THAT OTHER PEOPLE COULD HAVE NOTICED. OR THE OPPOSITE, BEING SO FIGETY OR RESTLESS THAT YOU HAVE BEEN MOVING AROUND A LOT MORE THAN USUAL: 3
7. TROUBLE CONCENTRATING ON THINGS, SUCH AS READING THE NEWSPAPER OR WATCHING TELEVISION: 3
5. POOR APPETITE OR OVEREATING: 3
SUM OF ALL RESPONSES TO PHQ QUESTIONS 1-9: 19
9. THOUGHTS THAT YOU WOULD BE BETTER OFF DEAD, OR OF HURTING YOURSELF: 1
SUM OF ALL RESPONSES TO PHQ QUESTIONS 1-9: 20
2. FEELING DOWN, DEPRESSED OR HOPELESS: 1
SUM OF ALL RESPONSES TO PHQ QUESTIONS 1-9: 20
SUM OF ALL RESPONSES TO PHQ9 QUESTIONS 1 & 2: 1
6. FEELING BAD ABOUT YOURSELF - OR THAT YOU ARE A FAILURE OR HAVE LET YOURSELF OR YOUR FAMILY DOWN: 3

## 2022-12-07 ASSESSMENT — COLUMBIA-SUICIDE SEVERITY RATING SCALE - C-SSRS
1. WITHIN THE PAST MONTH, HAVE YOU WISHED YOU WERE DEAD OR WISHED YOU COULD GO TO SLEEP AND NOT WAKE UP?: YES
6. HAVE YOU EVER DONE ANYTHING, STARTED TO DO ANYTHING, OR PREPARED TO DO ANYTHING TO END YOUR LIFE?: NO
2. HAVE YOU ACTUALLY HAD ANY THOUGHTS OF KILLING YOURSELF?: NO

## 2022-12-07 ASSESSMENT — ENCOUNTER SYMPTOMS
VOMITING: 0
DIARRHEA: 0
COUGH: 0
RHINORRHEA: 1

## 2022-12-07 NOTE — PROGRESS NOTES
[address] [facility]  [address] [place] [address] [address] [address]  Dept: [phone]  Dept Fax: [phone]    Adela Lundberg is a 60 y.o. male who presents today for his medical conditions/complaintsas noted below. Adela Lundberg is c/o of Otalgia (Ear infection on 10/16, been getting abx and still not cleared up, left ear )        HPI:     Otalgia   There is pain in the left ear. This is a chronic problem. The current episode started more than 1 month ago. The problem occurs constantly. The problem has been unchanged. There has been no fever. Associated symptoms include hearing loss and rhinorrhea. Pertinent negatives include no coughing, diarrhea, rash or vomiting. He has tried antibiotics (zpak,tessalon pearles, omnicef) for the symptoms. The treatment provided mild relief. His past medical history is significant for a chronic ear infection. Has been treated multiple time over past couple months, but ear infection has  not improved.  ENT referral sent in to Dr. Fabby Talamantes but cannot get in for 2-3 months  Past Medical History:   Diagnosis Date    Acute asthmatic bronchitis     Anemia     Hyperplastic anemia yrs ago    Anesthesia complication     \"angry after surgery can pull things out\"    Congestion and hemorrhage of prostate     Contusion of back(841.31)     wc    DDD (degenerative disc disease)     H/O cardiac catheterization     no stent    Hx of blood clots     after last ankle surgery    Impaired fasting glucose     Impotence of organic origin     wc    Leaky heart valve     \"slight\"    Major depressive disorder, single episode, moderate ([address]     wc    Migraine with aura, with intractable migraine, so stated, without mention of status migrainosus     wc   nausea and vominting daily patient on zofran     Other disorders of bone and cartilage(168.99)     wc    Persistent cough     finished both antibiotics    PONV (postoperative nausea and vomiting)     Postconcussion syndrome     wc    Primary insomnia     Risk for falls     Self-catheterizes urinary bladder     Shortness of breath     Sprain of ankle, unspecified site     wc    Sprain of foot, unspecified site     wc    Sprain of lumbar region     wc    Unspecified urinary incontinence     wc    Past medical history reviewed and pertinent positives/negatives in the HPI    Past Surgical History:   Procedure Laterality Date    ACHILLES TENDON SURGERY Right 11/19/2015    lengthing    CERVICAL FUSION      C4-5    COLONOSCOPY      ELBOW SURGERY Left     ELBOW SURGERY Right     ENDOSCOPY, COLON, DIAGNOSTIC      FOOT SURGERY Bilateral     FOOT SURGERY Right 03/30/2017    right achilles revision    FRACTURE SURGERY Left     elbow  fall three months ago       GASTRIC FUNDOPLICATION      LEG SURGERY Bilateral     KS DEEP DISSEC FOOT INFEC,1 BURSA Right 3/30/2017    RIGHT ACHILLES HYPERTROPHIC SCAR REVISION WITH EXCISION & ROTATIONAL FLAP  WITH PRP(CELLSAVER)  & CLARIX Sav Rodriguez GRAFT  performed by Linda Walter MD at [address] 11/4/2021    EGD BIOPSY, DILATION performed by Nneka Sandoval MD at [address] [place] [address]       History reviewed. No pertinent family history. Social History     Tobacco Use    Smoking status: Never    Smokeless tobacco: Never   Substance Use Topics    Alcohol use: No      Current Outpatient Medications   Medication Sig Dispense Refill    predniSONE (DELTASONE) 20 MG tablet Take 2 tablets by mouth daily for 5 days 10 tablet 0    levoFLOXacin (LEVAQUIN) 500 MG tablet Take 1 tablet by mouth daily for 7 days 7 tablet 0    calcium carbonate-vitamin D (CALTRATE) 600-400 MG-UNIT TABS per tab Take 2 tablets by mouth daily 60 tablet 2    sildenafil (VIAGRA) 100 MG tablet TAKE 1 TABLET BY MOUTH PER DAY AS NEEDED FOR ERECTILE DYSFUNCTION.  TAKE 1 HOUR PRIOR TO INTERCOURSE      levothyroxine (SYNTHROID) 50 MCG tablet Take 1 tablet by mouth every morning (before breakfast) Start with half of tablet for 7 days. 30 tablet 0    amitriptyline (ELAVIL) 150 MG tablet Take 1 tablet by mouth daily      pregabalin (LYRICA) 100 MG capsule Take 1 capsule by mouth 2 times daily. SENEXON-S 8.6-50 MG per tablet Take 1 tablet by mouth daily      alfuzosin (UROXATRAL) 10 MG extended release tablet Take 1 tablet by mouth daily      STOOL SOFTENER 100 MG capsule TAKE 1 TABLET BY MOUTH EVERY DAY AS NEEDED 28 capsule 0    oxyCODONE-acetaminophen (PERCOCET) 5-325 MG per tablet take 1 tablet by mouth once daily if needed      ondansetron (ZOFRAN-ODT) 8 MG TBDP disintegrating tablet dissolve 1 tablet under the tongue twice a day if needed      promethazine (PHENERGAN) 25 MG tablet Take 25 mg by mouth every 8 hours as needed for Nausea      VIAGRA 50 MG tablet   1     No current facility-administered medications for this visit. Allergies   Allergen Reactions    Rivaroxaban Shortness Of Breath and Other (See Comments)    Asa [Aspirin] Other (See Comments)     intolerance    Iv Dye [Iodides]      Allergy to Car Cath Dye (from free text allergy report). -- pt sts tolerates    Penicillins     Sulfa Antibiotics Other (See Comments)       Health Maintenance   Topic Date Due    Depression Monitoring  Never done    HIV screen  Never done    DTaP/Tdap/Td vaccine (1 - Tdap) Never done    Shingles vaccine (1 of 2) Never done    Annual Wellness Visit (AWV)  Never done    COVID-19 Vaccine (5 - Booster for Moderna series) 05/27/2022    Flu vaccine (1) 08/01/2022    A1C test (Diabetic or Prediabetic)  09/10/2022    Lipids  09/10/2026    Colorectal Cancer Screen  10/11/2028    Pneumococcal 0-64 years Vaccine  Completed    Hepatitis C screen  Completed    Hepatitis A vaccine  Aged Out    Hib vaccine  Aged Out    Meningococcal (ACWY) vaccine  Aged Out       :      Review of Systems   HENT:  Positive for ear pain, hearing loss and rhinorrhea. Respiratory:  Negative for cough.     Gastrointestinal: Negative for diarrhea and vomiting. Skin:  Negative for rash. Objective:     Physical Exam  Vitals and nursing note reviewed. Constitutional:       Appearance: Normal appearance. HENT:      Head: Normocephalic and atraumatic. Right Ear: Hearing, ear canal and external ear normal. A middle ear effusion is present. Left Ear: Hearing, ear canal and external ear normal. A middle ear effusion (cloudy) is present. Tympanic membrane is erythematous and bulging. Mouth/Throat:      Lips: Pink. Eyes:      Extraocular Movements: Extraocular movements intact. Conjunctiva/sclera: Conjunctivae normal.   Cardiovascular:      Rate and Rhythm: Normal rate. Pulmonary:      Effort: Pulmonary effort is normal.   Musculoskeletal:      Cervical back: Normal range of motion. No muscular tenderness. Skin:     General: Skin is warm and dry. Neurological:      Mental Status: He is alert and oriented to person, place, and time. Mental status is at baseline. Psychiatric:         Mood and Affect: Mood normal.         Behavior: Behavior normal.         Thought Content: Thought content normal.         Judgment: Judgment normal.     /74 (Site: Left Upper Arm, Position: Sitting, Cuff Size: Medium Adult)   Pulse 66   Temp 97.8 °F (36.6 °C) (Tympanic)   SpO2 96%     Assessment:       Diagnosis Orders   1.  Recurrent acute suppurative otitis media without spontaneous rupture of left tympanic membrane  JULIA - Leisa Cary MD, Otolaryngology, [facility]:    Take Levaquin and prednisone as prescribed for chronic ear infection  Follow up with ENT as soon as possible  If symptoms worsen or do not improve please follow-up with PCP or return to clinic    Orders Placed This Encounter   Procedures    Jennifer Restrepo MD, Otolaryngology, [place]     Referral Priority:   Urgent     Referral Type:   Eval and Treat     Referral Reason:   Specialty Services Required     Referred to Provider:   Leisa MD Raeann     Requested Specialty:   Otolaryngology     Number of Visits Requested:   1     Orders Placed This Encounter   Medications    predniSONE (DELTASONE) 20 MG tablet     Sig: Take 2 tablets by mouth daily for 5 days     Dispense:  10 tablet     Refill:  0    levoFLOXacin (LEVAQUIN) 500 MG tablet     Sig: Take 1 tablet by mouth daily for 7 days     Dispense:  7 tablet     Refill:  0      Patient given educational materials - see patient instructions. Discussed use, benefit, and side effects of prescribed medications. All patient questions answered. Pt voiced understanding. Patient agreed with treatment plan. Follow up as directed.      Electronicallysigned by Kelsey Price MD on 12/7/2022 at 9:37 AM

## 2022-12-12 RX ORDER — PREGABALIN 150 MG/1
CAPSULE ORAL
Qty: 56 CAPSULE | Refills: 0 | OUTPATIENT
Start: 2022-12-12

## 2022-12-14 ENCOUNTER — TELEPHONE (OUTPATIENT)
Dept: PRIMARY CARE CLINIC | Age: 62
End: 2022-12-14

## 2022-12-14 NOTE — TELEPHONE ENCOUNTER
Patient called stating that he tried to set up an appointment with Dr. Krystina Cartwright, but since the referral was not ordered Stat, they are not able to get him in any time soon. Patient says he is still having the same symptoms and is having a hard time hearing. Could you please change the referral to stat so we can try to get him seen there sooner?

## 2023-10-30 ENCOUNTER — OFFICE VISIT (OUTPATIENT)
Age: 63
End: 2023-10-30

## 2023-10-30 VITALS
SYSTOLIC BLOOD PRESSURE: 143 MMHG | DIASTOLIC BLOOD PRESSURE: 84 MMHG | HEIGHT: 75 IN | WEIGHT: 237 LBS | HEART RATE: 68 BPM | BODY MASS INDEX: 29.47 KG/M2

## 2023-10-30 DIAGNOSIS — G89.4 CHRONIC PAIN SYNDROME: Primary | ICD-10-CM

## 2023-10-30 NOTE — PROGRESS NOTES
female who presents with a longstanding history of chronic discomfort in his neck and lower back as well as pain and numbness coursing throughout both upper and lower extremities. Logic examination is fairly unremarkable with normal strength and no clear signs of myelopathy. Radiographic imaging in the form of the MRI of the cervical and lumbar spine demonstrate age-appropriate degenerative changes as well as postoperative changes anteriorly in his neck. There was no evidence of marked spinal stenosis, spinal cord compression or marked nerve root impingement. I did review these images in the office today with the patient. In reviewing the MRI images as stated above, I do not identify specific abnormality to which I feel surgical intervention would adequately alleviate his ongoing chronic discomfort. Thus no surgery has been recommended. Given that he has been through extensive conservative measures thus far, we did discuss the option of an implantable pain device such as a spinal cord stimulator. The patient indicates that he has talked about this with his pain management doctor in the past.  Thus I encouraged him to pain management doctor again about the possibility of a spinal cord stimulator. I took this opportunity to answer intermittent questions that he may have had. Again from a surgeon's perspective there is no specific abnormality noted on his current cervical or lumbar MRI imaging to which I feel surgical intervention would adequately alleviate his ongoing chronic pain. Thus no surgery has been recommended. Followup: No follow-ups on file. Prescriptions Ordered:  No orders of the defined types were placed in this encounter. Orders Placed:  No orders of the defined types were placed in this encounter. Electronically signed by Sukhjinder Beck MD on 10/30/2023 at 9:27 AM    Please note that this chart was generated using voice recognition Dragon dictation software.   Although every

## 2024-01-26 ENCOUNTER — INITIAL CONSULT (OUTPATIENT)
Dept: ONCOLOGY | Age: 64
End: 2024-01-26

## 2024-01-26 ENCOUNTER — TELEPHONE (OUTPATIENT)
Dept: ONCOLOGY | Age: 64
End: 2024-01-26

## 2024-01-26 VITALS
TEMPERATURE: 97.8 F | BODY MASS INDEX: 28.46 KG/M2 | WEIGHT: 228.9 LBS | SYSTOLIC BLOOD PRESSURE: 121 MMHG | RESPIRATION RATE: 18 BRPM | DIASTOLIC BLOOD PRESSURE: 79 MMHG | HEIGHT: 75 IN | HEART RATE: 70 BPM

## 2024-01-26 DIAGNOSIS — Z86.718 HISTORY OF DVT (DEEP VEIN THROMBOSIS): Primary | ICD-10-CM

## 2024-01-26 RX ORDER — PERMETHRIN 50 MG/G
1 CREAM TOPICAL ONCE
COMMUNITY
Start: 2022-09-03

## 2024-01-26 RX ORDER — DIVALPROEX SODIUM 500 MG/1
TABLET, EXTENDED RELEASE ORAL
COMMUNITY
Start: 2023-12-18

## 2024-01-26 RX ORDER — RIMEGEPANT SULFATE 75 MG/75MG
TABLET, ORALLY DISINTEGRATING ORAL
COMMUNITY
Start: 2023-09-20

## 2024-01-26 RX ORDER — IBUPROFEN 800 MG/1
TABLET ORAL
COMMUNITY
Start: 2024-01-23

## 2024-01-26 RX ORDER — BUTALBITAL, ACETAMINOPHEN AND CAFFEINE 50; 325; 40 MG/1; MG/1; MG/1
TABLET ORAL
COMMUNITY
Start: 2023-09-20

## 2024-01-26 RX ORDER — BACLOFEN 10 MG/1
TABLET ORAL
COMMUNITY
Start: 2024-01-10

## 2024-01-26 RX ORDER — MELATONIN 5 MG
TABLET ORAL
COMMUNITY
Start: 2024-01-10

## 2024-01-26 RX ORDER — MECLIZINE HYDROCHLORIDE 25 MG/1
TABLET ORAL
COMMUNITY
Start: 2024-01-11

## 2024-01-26 NOTE — TELEPHONE ENCOUNTER
JENNIFER HERE FOR CONSULTATION  Referral to Dr Sosa  RTC as needed  REFERRAL FOR DR SOSA IS ON 2/1/24 @ 9:45AM  MD VISIT AS NEEDED  AVS PRINTED W/ INSTRUCTIONS AND GIVEN  TO PT ON EXIT

## 2024-01-26 NOTE — PROGRESS NOTES
but based on the vascular surgery note in 2019 at [facility], it appears that his hypercoagulable workup was negative for antiphospholipid antibody syndrome, factor V Leiden etc.    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  But he reports that for past 2 years he has not been on any anticoagulation and so far I do not see any evidence of DVTs or PE around that time.  He has had multiple scans which were negative in the recent past.    He reports that because of his back pain he is not active as physically.  He denies any history of smoking.  But now with his symptoms of pain in his extremity and skin discoloration, I advised him to follow with vascular surgery to get reevaluated and discuss further treatment recommendations based on that.  For his other issues he will follow-up with his primary care physician and spine surgery as clinically indicated.    I explained that at this point he does not have any strong indication to put him on lifelong blood thinner however if he gets another blood clot then we will order/repeat his thrombophilia workup again and discuss anticoagulation.    Patient's questions were sought and answered to his satisfaction and he agreed to proceed with the plan       PLAN:     Patient has a very complex medical history  With limited data to review, I explained that given that he is off blood thinner for past 2 years and did not get any DVTs PE, there is no strong recommendation to put him on lifelong blood thinner at this point  For his lower extremity symptoms I recommended follow-up with vascular surgeon and he is interested in seeking another opinion so I will refer him to vascular surgery here  Will plan to see him as needed    Dallin Ortiz MD  Hematologist/Medical Oncologist    On this date 1/26/24  I have spent 80 minutes reviewing previous notes, test results and face to face with the patient discussing the diagnosis

## 2024-01-31 ENCOUNTER — TELEPHONE (OUTPATIENT)
Dept: VASCULAR SURGERY | Age: 64
End: 2024-01-31

## 2024-02-01 ENCOUNTER — TELEPHONE (OUTPATIENT)
Dept: VASCULAR SURGERY | Age: 64
End: 2024-02-01

## 2024-02-01 NOTE — TELEPHONE ENCOUNTER
Spoke with patient in regards to needing a C-9 before we can see him, patient stated that he did not need a C-9 because it is for his DVT, the writer explained that a C-9 was needed prior to seeing a new doctor or else the charge will go to him. The patient was very unhappy hearing this and stated for us to call him back to momo. And hung up the phone before he could be momo. Patient has been cancelled.    Patient claimed he never received a call from us on 1/31, his phone number was confirmed.

## 2024-02-08 ENCOUNTER — TELEPHONE (OUTPATIENT)
Dept: SURGERY | Age: 64
End: 2024-02-08

## 2024-06-12 ENCOUNTER — APPOINTMENT (OUTPATIENT)
Dept: CT IMAGING | Facility: CLINIC | Age: 64
End: 2024-06-12
Payer: MEDICARE

## 2024-06-12 ENCOUNTER — HOSPITAL ENCOUNTER (EMERGENCY)
Facility: CLINIC | Age: 64
Discharge: HOME | End: 2024-06-12
Attending: EMERGENCY MEDICINE
Payer: MEDICARE

## 2024-06-12 VITALS
TEMPERATURE: 97.9 F | HEIGHT: 75 IN | DIASTOLIC BLOOD PRESSURE: 79 MMHG | BODY MASS INDEX: 29.84 KG/M2 | RESPIRATION RATE: 18 BRPM | HEART RATE: 100 BPM | OXYGEN SATURATION: 96 % | WEIGHT: 240 LBS | SYSTOLIC BLOOD PRESSURE: 138 MMHG

## 2024-06-12 DIAGNOSIS — S09.90XA CLOSED HEAD INJURY, INITIAL ENCOUNTER: ICD-10-CM

## 2024-06-12 DIAGNOSIS — G43.709 CHRONIC MIGRAINE WITHOUT AURA WITHOUT STATUS MIGRAINOSUS, NOT INTRACTABLE: ICD-10-CM

## 2024-06-12 DIAGNOSIS — J06.9 ACUTE UPPER RESPIRATORY INFECTION: Primary | ICD-10-CM

## 2024-06-12 PROCEDURE — 6360000002 HC RX W HCPCS: Performed by: EMERGENCY MEDICINE

## 2024-06-12 PROCEDURE — 70450 CT HEAD/BRAIN W/O DYE: CPT

## 2024-06-12 PROCEDURE — 99284 EMERGENCY DEPT VISIT MOD MDM: CPT

## 2024-06-12 PROCEDURE — 96372 THER/PROPH/DIAG INJ SC/IM: CPT

## 2024-06-12 RX ORDER — KETOROLAC TROMETHAMINE 30 MG/ML
30 INJECTION, SOLUTION INTRAMUSCULAR; INTRAVENOUS ONCE
Status: COMPLETED | OUTPATIENT
Start: 2024-06-12 | End: 2024-06-12

## 2024-06-12 RX ORDER — AZITHROMYCIN 250 MG/1
TABLET, FILM COATED ORAL
Qty: 1 PACKET | Refills: 0 | Status: SHIPPED | OUTPATIENT
Start: 2024-06-12

## 2024-06-12 RX ORDER — POLYMYXIN B SULFATE AND TRIMETHOPRIM 1; 10000 MG/ML; [USP'U]/ML
1 SOLUTION OPHTHALMIC EVERY 6 HOURS
Qty: 10 ML | Refills: 0 | Status: SHIPPED | OUTPATIENT
Start: 2024-06-12 | End: 2024-06-19

## 2024-06-12 RX ADMIN — KETOROLAC TROMETHAMINE 30 MG: 30 INJECTION, SOLUTION INTRAMUSCULAR at 18:37

## 2024-06-12 ASSESSMENT — ENCOUNTER SYMPTOMS
VOMITING: 0
SHORTNESS OF BREATH: 0
DIARRHEA: 0
SORE THROAT: 0

## 2024-06-12 ASSESSMENT — PAIN - FUNCTIONAL ASSESSMENT
PAIN_FUNCTIONAL_ASSESSMENT: 0-10
PAIN_FUNCTIONAL_ASSESSMENT: 0-10
PAIN_FUNCTIONAL_ASSESSMENT: ACTIVITIES ARE NOT PREVENTED

## 2024-06-12 ASSESSMENT — PAIN SCALES - GENERAL
PAINLEVEL_OUTOF10: 5
PAINLEVEL_OUTOF10: 3

## 2024-06-12 ASSESSMENT — PAIN DESCRIPTION - LOCATION: LOCATION: HEAD;FACE

## 2024-06-12 ASSESSMENT — PAIN DESCRIPTION - FREQUENCY: FREQUENCY: CONTINUOUS

## 2024-06-12 ASSESSMENT — PAIN DESCRIPTION - DESCRIPTORS: DESCRIPTORS: PRESSURE

## 2024-06-12 ASSESSMENT — PAIN DESCRIPTION - ONSET: ONSET: ON-GOING

## 2024-06-12 ASSESSMENT — PAIN DESCRIPTION - PAIN TYPE: TYPE: ACUTE PAIN

## 2024-06-12 NOTE — ED NOTES
Pt presents to ED c/o headache, sinus pressure, and eye drainage for the past few days. Pt also states he fell and hit his head on his stairs 5 days ago but states he is more concerned with the other symptoms. Pt denies LOC and no obvious injury noted. Pt exhibits redness and watery drainage in bilateral eyes. Pt arrives A/Ox4, PWD, PMS intact. Pt resting on stretcher with call light in reach.

## 2024-06-12 NOTE — ED PROVIDER NOTES
[facility]  [address] [facility] [address]  Phone: [phone]        [facility] ED  EMERGENCY DEPARTMENT ENCOUNTER      Pt Name: Kp Harris  MRN: [id number]  Birthdate 1960  Date of evaluation: 6/12/2024  Provider: Gold Mariano DO    CHIEF COMPLAINT       Chief Complaint   Patient presents with   • Fall   • Headache   • Eye Drainage         HISTORY OF PRESENT ILLNESS   (Location/Symptom, Timing/Onset,Context/Setting, Quality, Duration, Modifying Factors, Severity)  Note limiting factors.   Kp Harris is a 63 y.o. male who presents to the emergency department for the evaluation of a fall a couple of days ago, persistent headache and some eye drainage as well as congestion and fatigue.  Patient reports that he had a full 2 steps about 4 5 days ago.  No loss of consciousness but he was a little bit disoriented.  No vomiting.  Has been feeling tired the last 2 days and having nasal congestion and drainage along with matting of the eyes, left greater than right.  He does have chronic headaches for which she gets Nurtec and Botox injections but he did not go today to get that because he did not feel good.    Nursing Notes were reviewed.    REVIEW OF SYSTEMS    (2-9systems for level 4, 10 or more for level 5)     Review of Systems   Constitutional:  Negative for fever.   HENT:  Negative for sore throat.    Respiratory:  Negative for shortness of breath.    Cardiovascular:  Negative for chest pain.   Gastrointestinal:  Negative for diarrhea and vomiting.   Genitourinary:  Negative for dysuria.   Skin:  Negative for rash.   Neurological:  Positive for headaches. Negative for weakness.   All other systems reviewed and are negative.      Except asnoted above the remainder of the review of systems was reviewed and negative.       PAST MEDICAL HISTORY     Past Medical History:   Diagnosis Date   • Acute asthmatic bronchitis    • Anemia     Hyperplastic anemia yrs ago   • Anesthesia

## 2024-06-12 NOTE — ED PROVIDER NOTES
FACULTY SIGN-OUT  ADDENDUM     Care of this patient was assumed from Dr. Mariano.  The patient was seen for Fall, Headache, and Eye Drainage  .  The patient's initial evaluation and plan have been discussed with the prior provider who initially evaluated the patient.  Nursing Notes, Past Medical Hx, Past Surgical Hx, Social Hx, Allergies, and Family Hx were all reviewed.      ED COURSE      The patient was given the following medications:  Orders Placed This Encounter   Medications    ketorolac (TORADOL) injection 30 mg    azithromycin (ZITHROMAX Z-KELLEE) 250 MG tablet     Sig: Follow directions on package labelling     Dispense:  1 packet     Refill:  0    trimethoprim-polymyxin b (POLYTRIM) 11008-5.1 UNIT/ML-% ophthalmic solution     Sig: Place 1 drop into both eyes every 6 hours for 7 days     Dispense:  10 mL     Refill:  0       Labs Reviewed - No data to display    CT HEAD WO CONTRAST    Result Date: 6/12/2024  EXAMINATION: CT OF THE HEAD WITHOUT CONTRAST  6/12/2024 3:50 pm TECHNIQUE: CT of the head was performed without the administration of intravenous contrast. Automated exposure control, iterative reconstruction, and/or weight based adjustment of the mA/kV was utilized to reduce the radiation dose to as low as reasonably achievable. COMPARISON: 09/10/2021 HISTORY: ORDERING SYSTEM PROVIDED HISTORY: fall, head injury, headache TECHNOLOGIST PROVIDED HISTORY: fall, head injury, headache Decision Support Exception - unselect if not a suspected or confirmed emergency medical condition->Emergency Medical Condition (MA) Reason for Exam: Fall, headache, sinus pressure and drainage. CT BRAIN FINDINGS: BRAIN/VENTRICLES: The cerebral hemispheres, brainstem, and cerebellum have a normal appearance . The falx is midline. The ventricles and peripheral sulci are normal.  There is no sign of a space occupying lesion, infarction, or hemorrhage. Orbits: Portion of the orbits demonstrate no acute abnormality. SINUSES: Soft tissue

## 2024-08-27 ENCOUNTER — HOSPITAL ENCOUNTER (EMERGENCY)
Facility: CLINIC | Age: 64
Discharge: HOME | End: 2024-08-27
Attending: EMERGENCY MEDICINE
Payer: MEDICARE

## 2024-08-27 VITALS
SYSTOLIC BLOOD PRESSURE: 143 MMHG | RESPIRATION RATE: 18 BRPM | BODY MASS INDEX: 29.84 KG/M2 | DIASTOLIC BLOOD PRESSURE: 96 MMHG | OXYGEN SATURATION: 97 % | HEART RATE: 80 BPM | TEMPERATURE: 97.8 F | HEIGHT: 75 IN | WEIGHT: 240 LBS

## 2024-08-27 DIAGNOSIS — U07.1 COVID-19: Primary | ICD-10-CM

## 2024-08-27 PROCEDURE — 96372 THER/PROPH/DIAG INJ SC/IM: CPT

## 2024-08-27 PROCEDURE — 99284 EMERGENCY DEPT VISIT MOD MDM: CPT

## 2024-08-27 PROCEDURE — 6360000002 HC RX W HCPCS: Performed by: EMERGENCY MEDICINE

## 2024-08-27 RX ORDER — KETOROLAC TROMETHAMINE 30 MG/ML
30 INJECTION, SOLUTION INTRAMUSCULAR; INTRAVENOUS ONCE
Status: COMPLETED | OUTPATIENT
Start: 2024-08-27 | End: 2024-08-27

## 2024-08-27 RX ORDER — PREDNISONE 10 MG/1
10 TABLET ORAL SEE ADMIN INSTRUCTIONS
Qty: 17 TABLET | Refills: 0 | Status: SHIPPED | OUTPATIENT
Start: 2024-08-27 | End: 2024-09-02

## 2024-08-27 RX ORDER — PREDNISONE 10 MG/1
10 TABLET ORAL SEE ADMIN INSTRUCTIONS
Qty: 17 TABLET | Refills: 0 | Status: SHIPPED | OUTPATIENT
Start: 2024-08-27 | End: 2024-08-27

## 2024-08-27 RX ADMIN — KETOROLAC TROMETHAMINE 30 MG: 30 INJECTION, SOLUTION INTRAMUSCULAR at 15:02

## 2024-08-27 ASSESSMENT — ENCOUNTER SYMPTOMS
VOMITING: 0
DIARRHEA: 0
SHORTNESS OF BREATH: 0
SORE THROAT: 0

## 2024-08-27 ASSESSMENT — PAIN - FUNCTIONAL ASSESSMENT: PAIN_FUNCTIONAL_ASSESSMENT: NONE - DENIES PAIN

## 2024-08-27 NOTE — ED PROVIDER NOTES
[facility]  [address] [place] [id number]  Phone: [phone]        [facility] ED  EMERGENCY DEPARTMENT ENCOUNTER      Pt Name: Kp Harris  MRN: [id number]  Birthdate 1960  Date of evaluation: 8/27/2024  Provider: Gold Mariano DO    CHIEF COMPLAINT       Chief Complaint   Patient presents with    Positive For Covid-19    Shortness of Breath         HISTORY OF PRESENT ILLNESS   (Location/Symptom, Timing/Onset,Context/Setting, Quality, Duration, Modifying Factors, Severity)  Note limiting factors.   Kp Harris is a 63 y.o. male who presents to the emergency department for the evaluation of COVID-19.  Patient has had this for approximately 10 days.  He took his prescription similar to Paxlovid which she completed yesterday.  He has some intermittent muscle aches and pains, feels some burning with inhalation.  Still has a lot of fatigue.  Presents for evaluation.  No fever.  No vomiting but he did vomit earlier in the course    Nursing Notes were reviewed.    REVIEW OF SYSTEMS    (2-9systems for level 4, 10 or more for level 5)     Review of Systems   Constitutional:  Positive for fatigue. Negative for fever.   HENT:  Negative for sore throat.    Respiratory:  Negative for shortness of breath.    Cardiovascular:  Negative for chest pain.   Gastrointestinal:  Negative for diarrhea and vomiting.   Genitourinary:  Negative for dysuria.   Musculoskeletal:  Positive for myalgias.   Skin:  Negative for rash.   Neurological:  Negative for weakness.   All other systems reviewed and are negative.      Except asnoted above the remainder of the review of systems was reviewed and negative.       PAST MEDICAL HISTORY     Past Medical History:   Diagnosis Date    Acute asthmatic bronchitis     Anemia     Hyperplastic anemia yrs ago    Anesthesia complication     \"angry after surgery can pull things out\"    Congestion and hemorrhage of prostate     Contusion of back(922.31)     wc    DDD  not ill-appearing or toxic-appearing.   HENT:      Head: Normocephalic and atraumatic.      Nose: Nose normal. No congestion.      Mouth/Throat:      Mouth: Mucous membranes are moist.   Eyes:      General:         Right eye: No discharge.         Left eye: No discharge.      Conjunctiva/sclera: Conjunctivae normal.   Cardiovascular:      Rate and Rhythm: Normal rate and regular rhythm.      Pulses: Normal pulses.      Heart sounds: Normal heart sounds. No murmur heard.  Pulmonary:      Effort: Pulmonary effort is normal. No respiratory distress.      Breath sounds: Normal breath sounds. No wheezing.   Abdominal:      General: Abdomen is flat. There is no distension.      Palpations: Abdomen is soft. There is no pulsatile mass.      Tenderness: There is no abdominal tenderness. There is no guarding or rebound.      Comments: No pulsatile mass   Musculoskeletal:         General: No deformity or signs of injury. Normal range of motion.      Cervical back: Normal range of motion.   Skin:     General: Skin is warm and dry.      Capillary Refill: Capillary refill takes less than 2 seconds.      Findings: No rash.   Neurological:      General: No focal deficit present.      Mental Status: He is alert. Mental status is at baseline.      Motor: No weakness.      Comments: Speaking normally. No facial asymmetry. Moving all 4 extremities. Normal gait.    Psychiatric:         Mood and Affect: Mood normal.         EMERGENCY DEPARTMENT COURSE and DIFFERENTIAL DIAGNOSIS/MDM:   Vitals:    Vitals:    08/27/24 1436   BP: (!) 143/96   Pulse: 80   Resp: 18   Temp: 97.8 °F (36.6 °C)   TempSrc: Oral   SpO2: 97%   Weight: 108.9 kg (240 lb)   Height: 1.905 m (6' 3\")       Patient presents to the emergency department with a complaint described above.  Vitals are normal.  He is nontoxic, resting comfortably, no distress.  Normal exam.  I talked about a short course of steroids for symptomatic relief.  Hydration, rest, follow-up and what to

## 2024-08-27 NOTE — ED NOTES
Pt presents to ED c/o fatigue and SOB. Pt reports he tested positive for COVID on 8/20. Pt is eupneic with SpO2 of 97% on RA. Pt shows no signs of distress. Pt arrives A/Ox4, PWD, PMS intact. Pt resting on stretcher with call light in reach.

## 2024-09-18 SDOH — HEALTH STABILITY: PHYSICAL HEALTH: ON AVERAGE, HOW MANY MINUTES DO YOU ENGAGE IN EXERCISE AT THIS LEVEL?: 0 MIN

## 2024-09-18 SDOH — HEALTH STABILITY: PHYSICAL HEALTH: ON AVERAGE, HOW MANY DAYS PER WEEK DO YOU ENGAGE IN MODERATE TO STRENUOUS EXERCISE (LIKE A BRISK WALK)?: 0 DAYS

## 2024-09-19 ENCOUNTER — OFFICE VISIT (OUTPATIENT)
Age: 64
End: 2024-09-19

## 2024-09-19 VITALS — HEIGHT: 75 IN | BODY MASS INDEX: 29.84 KG/M2 | WEIGHT: 240 LBS

## 2024-09-19 DIAGNOSIS — M94.261 CHONDROMALACIA OF RIGHT KNEE: Primary | ICD-10-CM

## 2024-09-19 DIAGNOSIS — S83.281A TEAR OF LATERAL MENISCUS OF RIGHT KNEE, CURRENT, UNSPECIFIED TEAR TYPE, INITIAL ENCOUNTER: ICD-10-CM

## 2024-09-25 ENCOUNTER — TELEPHONE (OUTPATIENT)
Age: 64
End: 2024-09-25

## 2024-11-11 ENCOUNTER — APPOINTMENT (OUTPATIENT)
Dept: GENERAL RADIOLOGY | Facility: CLINIC | Age: 64
End: 2024-11-11
Payer: MEDICARE

## 2024-11-11 ENCOUNTER — HOSPITAL ENCOUNTER (EMERGENCY)
Facility: CLINIC | Age: 64
Discharge: HOME | End: 2024-11-11
Attending: EMERGENCY MEDICINE
Payer: MEDICARE

## 2024-11-11 VITALS
SYSTOLIC BLOOD PRESSURE: 148 MMHG | DIASTOLIC BLOOD PRESSURE: 90 MMHG | HEIGHT: 75 IN | BODY MASS INDEX: 29.84 KG/M2 | RESPIRATION RATE: 18 BRPM | OXYGEN SATURATION: 98 % | HEART RATE: 79 BPM | TEMPERATURE: 97.7 F | WEIGHT: 240 LBS

## 2024-11-11 DIAGNOSIS — J18.9 COMMUNITY ACQUIRED PNEUMONIA, UNSPECIFIED LATERALITY: Primary | ICD-10-CM

## 2024-11-11 LAB
FLUAV AG SPEC QL: NEGATIVE
FLUBV AG SPEC QL: NEGATIVE
SARS-COV-2 RDRP RESP QL NAA+PROBE: NOT DETECTED
SPECIMEN DESCRIPTION: NORMAL

## 2024-11-11 PROCEDURE — 87804 INFLUENZA ASSAY W/OPTIC: CPT

## 2024-11-11 PROCEDURE — 36415 COLL VENOUS BLD VENIPUNCTURE: CPT

## 2024-11-11 PROCEDURE — 87635 SARS-COV-2 COVID-19 AMP PRB: CPT

## 2024-11-11 PROCEDURE — 6370000000 HC RX 637 (ALT 250 FOR IP): Performed by: NURSE PRACTITIONER

## 2024-11-11 PROCEDURE — 99284 EMERGENCY DEPT VISIT MOD MDM: CPT

## 2024-11-11 PROCEDURE — 71046 X-RAY EXAM CHEST 2 VIEWS: CPT

## 2024-11-11 RX ORDER — AZITHROMYCIN 250 MG/1
TABLET, FILM COATED ORAL
Qty: 1 PACKET | Refills: 0 | Status: SHIPPED | OUTPATIENT
Start: 2024-11-11 | End: 2024-11-15

## 2024-11-11 RX ORDER — AZITHROMYCIN 250 MG/1
500 TABLET, FILM COATED ORAL ONCE
Status: COMPLETED | OUTPATIENT
Start: 2024-11-11 | End: 2024-11-11

## 2024-11-11 RX ORDER — BENZONATATE 100 MG/1
100 CAPSULE ORAL 2 TIMES DAILY PRN
Qty: 14 CAPSULE | Refills: 0 | Status: SHIPPED | OUTPATIENT
Start: 2024-11-11 | End: 2024-11-18

## 2024-11-11 RX ADMIN — AZITHROMYCIN DIHYDRATE 500 MG: 250 TABLET ORAL at 12:13

## 2024-11-11 ASSESSMENT — ENCOUNTER SYMPTOMS
NAUSEA: 1
EYE DISCHARGE: 0
COUGH: 1
VOMITING: 1
CONSTIPATION: 0
BACK PAIN: 0
SHORTNESS OF BREATH: 0
SORE THROAT: 1
DIARRHEA: 0
ABDOMINAL PAIN: 0

## 2024-11-11 ASSESSMENT — PAIN - FUNCTIONAL ASSESSMENT: PAIN_FUNCTIONAL_ASSESSMENT: NONE - DENIES PAIN

## 2024-11-11 NOTE — ED NOTES
Pt presents to ED c/o cough and URI symptoms for the past few days. Pt arrives A/Ox4, afebrile, PWD, PMS intact. Pt resting on stretcher with call light in reach.  
No

## 2024-11-11 NOTE — ED PROVIDER NOTES
[facility]      Pt Name: Kp Harris  MRN: [id number]  Birthdate 1960  Date of evaluation: 11/11/2024    EMERGENCY DEPARTMENT ENCOUNTER           I reviewed the mid level provider's note and agree with the documented findings and we have discussed the plan of care. I have reviewed the emergency nurses triage note. I agree with the chief complaint, past medical history, past surgical history, allergies, medications, social and family history as documented unless otherwise noted below.  I have also reviewed any labs EKGs and imaging in addition to medications given throughout the course of the patient's stay    CHIEF COMPLAINT       Chief Complaint   Patient presents with    Cough     Review midlevel documentation    PAST MEDICAL HISTORY    has a past medical history of Acute asthmatic bronchitis, Anemia, Anesthesia complication, Congestion and hemorrhage of prostate, Contusion of back(922.31), DDD (degenerative disc disease), H/O cardiac catheterization, Hx of blood clots, Impaired fasting glucose, Impotence of organic origin, Leaky heart valve, Major depressive disorder, single episode, moderate (HCC), Migraine with aura, with intractable migraine, so stated, without mention of status migrainosus, Other disorders of bone and cartilage(733.99), Persistent cough, PONV (postoperative nausea and vomiting), Postconcussion syndrome, Primary insomnia, Risk for falls, Self-catheterizes urinary bladder, Shortness of breath, Sprain of ankle, unspecified site, Sprain of foot, unspecified site, Sprain of lumbar region, and Unspecified urinary incontinence.    SURGICAL HISTORY      has a past surgical history that includes Gastric fundoplication; Elbow surgery (Left); Leg Surgery (Bilateral); cervical fusion; Foot surgery (Bilateral); Colonoscopy; Endoscopy, colon, diagnostic; skin biopsy; fracture surgery (Left); Achilles tendon surgery (Right, 11/19/2015); Foot surgery (Right, 03/30/2017); pr i&d below

## 2024-11-11 NOTE — ED PROVIDER NOTES
[facility]  EMERGENCY DEPARTMENT ENCOUNTER      Pt Name: Kp Harris  MRN: [id number]  Birthdate 1960  Date of evaluation: 11/11/2024  Provider: ALEKSANDRA Arthur CNP  12:15 PM    CHIEF COMPLAINT       Chief Complaint   Patient presents with    Cough         HISTORY OF PRESENT ILLNESS    Kp Harris is a 64 y.o. male who presents to the emergency department      This is a nontoxic-appearing 64-year-old male presenting to the emergency department via private auto, the patient reports that over the past 7 days he started with nasal congestion, sore throat which is since resolved, nonproductive cough; states he was at [facility] earlier prior to symptoms starting having a knee x-ray completed and was notified that he was exposed to pneumonia, strep throat, RSV, the patient was concerned because his symptoms are persisting, prompting him to come to the emergency department for evaluation of infection.  The patient took 2 over-the-counter acetaminophen prior to arrival.  Patient denies history of asthma or COPD, is not a tobacco smoker.    The history is provided by the patient and medical records.       Nursing Notes were reviewed.    REVIEW OF SYSTEMS       Review of Systems   Constitutional:  Positive for chills. Negative for fever.   HENT:  Positive for congestion and sore throat. Negative for ear pain and sneezing.    Eyes:  Negative for discharge and visual disturbance.   Respiratory:  Positive for cough. Negative for shortness of breath.    Cardiovascular:  Negative for chest pain and palpitations.   Gastrointestinal:  Positive for nausea and vomiting. Negative for abdominal pain, constipation and diarrhea.   Genitourinary:  Negative for dysuria and frequency.   Musculoskeletal:  Negative for back pain and neck pain.   Skin:  Negative for rash and wound.   Neurological:  Positive for headaches. Negative for weakness and numbness.   Psychiatric/Behavioral:  Negative for confusion and

## 2024-11-11 NOTE — DISCHARGE INSTRUCTIONS
Complete azithromycin as prescribed.    Tessalon pearls as prescribed to help with coughing.    Return to the ER: Fevers not responding to Tylenol or ibuprofen, chest pain, shortness of breath breathing difficulty, weakness, confusion; or any other concerning symptoms.

## 2025-01-02 ENCOUNTER — TELEPHONE (OUTPATIENT)
Age: 65
End: 2025-01-02

## 2025-01-02 NOTE — TELEPHONE ENCOUNTER
Riccardo,  The diagnosis code S83.281A Lateral mensicus tear of right knee was submitted to the [facility] of Worker's comp by Dr Stafford.   On 12/13/24 Eduardo Winters MD did a medical report whereby he opined that the requested condition is related to degeneration and not traumatically induced and should not be approved. If you disagree with Dr Winters please provide a narrative report in support of the requested condition. THE HEARING IS SET FOR 01/16/2025.     Since Dr Stafford submitted the request do you wish for their office to do the rebuttal.   I have the Physician Review scanned into his chart under the date of service 09/19/24 listed as [facility] PHYSICIAN REVIEW   Please advise.

## 2025-06-13 ENCOUNTER — TELEPHONE (OUTPATIENT)
Age: 65
End: 2025-06-13

## 2025-06-13 NOTE — TELEPHONE ENCOUNTER
Patient called this morning stating his right knee has been burning all the time and popping. He is hoping he did not do any more damage to his knee.  He is requesting a gel injection.   He is approved for the arthroscopy partial lateral meniscectomy but must wait until he is cleared from Dr Deshpande that did his elbow surgery. He is hoping within the next 3-4 months he will be able to have knee surgery. He is scheduled to come in and see you on 6/19/25 at 1:00 PM. Are you in agreement for the gel injection. If so, I will submit a C-9 request.

## 2025-06-19 ENCOUNTER — OFFICE VISIT (OUTPATIENT)
Age: 65
End: 2025-06-19

## 2025-06-19 VITALS — BODY MASS INDEX: 29.84 KG/M2 | WEIGHT: 240 LBS | HEIGHT: 75 IN

## 2025-06-19 DIAGNOSIS — S83.281A TEAR OF LATERAL MENISCUS OF RIGHT KNEE, CURRENT, UNSPECIFIED TEAR TYPE, INITIAL ENCOUNTER: Primary | ICD-10-CM

## 2025-06-19 NOTE — PROGRESS NOTES
[facility]      [facility] ORTHOPAEDICS AND SPORTS MEDICINE  [facility]  [place] [address]  Dept: [phone]  Dept Fax: [phone]    Chief Compliant:  Chief Complaint   Patient presents with    Follow-up     Right knee burning and popping        History of Present Illness:  6/19/25:This is a pleasant 64 y.o. male who is here today to discuss his ongoing right knee pain burning and popping sensations.  The pain is all lateral.  He about 6 weeks ago underwent a left total elbow arthroplasty by Dr. Cuevas.  He recently saw him yesterday and per the patient he was given the go ahead to proceed with arthroscopic knee surgery.    Physical Exam: The right knee has lateral joint line tenderness.  He has good range of motion of the knee.  He has an antalgic gait pattern.  He has an effusion.    Imaging: X-rays from 2024 show some residual joint space in the knee.  MRI was from 2023 which shows a lateral meniscus tear.      Assessment and Plan:    This is a pleasant 64 y.o. male who has a right knee lateral meniscus tear as well as some osteoarthritis.  I discussed with him risk benefits and alternatives of right knee arthroscopic partial lateral meniscectomy.  I explained this likely will give some relief as he does have popping and mechanical symptoms.  Explained that it will not completely alleviate all pain in the knee due to his osteoarthritis.  I do however think this is a good option for him particularly because he is still recovering from a total elbow replacement.  Performing a total knee replacement for him would require weightbearing through a walker and that would likely cause his total elbow replacement to have significant problems.  Risk benefits and alternatives were discussed.  Risks include but are not limited to infection, stiffness, incomplete relief of symptoms, failure of any repair, injury to

## 2025-06-20 ENCOUNTER — PREP FOR PROCEDURE (OUTPATIENT)
Age: 65
End: 2025-06-20

## 2025-06-20 DIAGNOSIS — M94.261 CHONDROMALACIA OF RIGHT KNEE: ICD-10-CM

## 2025-06-20 PROBLEM — S83.281A ACUTE LATERAL MENISCUS TEAR OF RIGHT KNEE: Status: ACTIVE | Noted: 2025-06-20

## 2025-07-20 ENCOUNTER — APPOINTMENT (OUTPATIENT)
Dept: GENERAL RADIOLOGY | Facility: CLINIC | Age: 65
End: 2025-07-20
Payer: MEDICARE

## 2025-07-20 ENCOUNTER — HOSPITAL ENCOUNTER (EMERGENCY)
Facility: CLINIC | Age: 65
Discharge: HOME | End: 2025-07-20
Attending: EMERGENCY MEDICINE
Payer: MEDICARE

## 2025-07-20 VITALS
WEIGHT: 236 LBS | HEART RATE: 97 BPM | RESPIRATION RATE: 16 BRPM | SYSTOLIC BLOOD PRESSURE: 150 MMHG | HEIGHT: 75 IN | BODY MASS INDEX: 29.34 KG/M2 | OXYGEN SATURATION: 96 % | TEMPERATURE: 98.5 F | DIASTOLIC BLOOD PRESSURE: 76 MMHG

## 2025-07-20 DIAGNOSIS — J02.9 ACUTE PHARYNGITIS, UNSPECIFIED ETIOLOGY: ICD-10-CM

## 2025-07-20 DIAGNOSIS — J06.9 ACUTE UPPER RESPIRATORY INFECTION: ICD-10-CM

## 2025-07-20 DIAGNOSIS — J18.9 PNEUMONIA OF RIGHT LOWER LOBE DUE TO INFECTIOUS ORGANISM: ICD-10-CM

## 2025-07-20 DIAGNOSIS — R51.9 SINUS HEADACHE: Primary | ICD-10-CM

## 2025-07-20 LAB
SPECIMEN SOURCE: NORMAL
STREP A, MOLECULAR: NEGATIVE

## 2025-07-20 PROCEDURE — 96372 THER/PROPH/DIAG INJ SC/IM: CPT

## 2025-07-20 PROCEDURE — 2500000003 HC RX 250 WO HCPCS

## 2025-07-20 PROCEDURE — 6360000002 HC RX W HCPCS

## 2025-07-20 PROCEDURE — 99284 EMERGENCY DEPT VISIT MOD MDM: CPT

## 2025-07-20 PROCEDURE — 71046 X-RAY EXAM CHEST 2 VIEWS: CPT

## 2025-07-20 PROCEDURE — 6370000000 HC RX 637 (ALT 250 FOR IP)

## 2025-07-20 PROCEDURE — 87651 STREP A DNA AMP PROBE: CPT

## 2025-07-20 RX ORDER — GUAIFENESIN 200 MG/10ML
200 LIQUID ORAL ONCE
Status: COMPLETED | OUTPATIENT
Start: 2025-07-20 | End: 2025-07-20

## 2025-07-20 RX ORDER — KETOROLAC TROMETHAMINE 30 MG/ML
30 INJECTION, SOLUTION INTRAMUSCULAR; INTRAVENOUS ONCE
Status: COMPLETED | OUTPATIENT
Start: 2025-07-20 | End: 2025-07-20

## 2025-07-20 RX ORDER — DEXAMETHASONE 4 MG/1
8 TABLET ORAL ONCE
Status: COMPLETED | OUTPATIENT
Start: 2025-07-20 | End: 2025-07-20

## 2025-07-20 RX ORDER — AZITHROMYCIN 250 MG/1
250 TABLET, FILM COATED ORAL DAILY
Qty: 10 TABLET | Refills: 0 | Status: SHIPPED | OUTPATIENT
Start: 2025-07-20 | End: 2025-07-30

## 2025-07-20 RX ORDER — GUAIFENESIN 600 MG/1
600 TABLET, EXTENDED RELEASE ORAL 2 TIMES DAILY
Qty: 30 TABLET | Refills: 0 | Status: SHIPPED | OUTPATIENT
Start: 2025-07-20 | End: 2025-08-04

## 2025-07-20 RX ORDER — AZITHROMYCIN 250 MG/1
500 TABLET, FILM COATED ORAL ONCE
Status: COMPLETED | OUTPATIENT
Start: 2025-07-20 | End: 2025-07-20

## 2025-07-20 RX ORDER — ALBUTEROL SULFATE 90 UG/1
2 INHALANT RESPIRATORY (INHALATION) 4 TIMES DAILY PRN
Qty: 54 G | Refills: 0 | Status: SHIPPED | OUTPATIENT
Start: 2025-07-20

## 2025-07-20 RX ADMIN — GUAIFENESIN 200 MG: 200 SOLUTION ORAL at 19:29

## 2025-07-20 RX ADMIN — KETOROLAC TROMETHAMINE 30 MG: 30 INJECTION, SOLUTION INTRAMUSCULAR at 19:28

## 2025-07-20 RX ADMIN — DEXAMETHASONE 8 MG: 4 TABLET ORAL at 19:28

## 2025-07-20 RX ADMIN — AMOXICILLIN AND CLAVULANATE POTASSIUM 1 TABLET: 875; 125 TABLET, FILM COATED ORAL at 20:05

## 2025-07-20 RX ADMIN — AZITHROMYCIN DIHYDRATE 500 MG: 250 TABLET ORAL at 20:05

## 2025-07-20 ASSESSMENT — PAIN SCALES - GENERAL
PAINLEVEL_OUTOF10: 8
PAINLEVEL_OUTOF10: 6

## 2025-07-20 ASSESSMENT — LIFESTYLE VARIABLES
HOW OFTEN DO YOU HAVE A DRINK CONTAINING ALCOHOL: NEVER
HOW MANY STANDARD DRINKS CONTAINING ALCOHOL DO YOU HAVE ON A TYPICAL DAY: PATIENT DOES NOT DRINK

## 2025-07-20 ASSESSMENT — PAIN - FUNCTIONAL ASSESSMENT: PAIN_FUNCTIONAL_ASSESSMENT: 0-10

## 2025-07-20 NOTE — DISCHARGE INSTRUCTIONS
Take your medication as indicated and prescribed.  Please schedule follow-up appoint with your primary care physician.     two prescriptions, azithromycin and augmentin, antibiotics. Take the augmentin twice daily for ten days and azithromycin daily. Take with food to reduce GI upset.    Use your inhaler, 2 puffs every 4-6 hours as needed for shortness of breath or wheezing.     For pain use acetaminophen (Tylenol) or ibuprofen (Motrin / Advil), unless prescribed medications that have acetaminophen or ibuprofen (or similar medications) in it.  You can take over the counter acetaminophen tablets (1 - 2 tablets of the 500-mg strength every 6 hours) or ibuprofen tablets (2 tablets every 4 hours).    You need to take Zyrtec nightly to prevent ear pain and fullness while you are recovering from this viral upper respiratory infection.    Take Mucinex twice daily to keep secretions thin, this will help you with coughing up any secretions and allowing your sinuses to drain which will reduce the sinus pressure you are experiencing.    Review handout for sinus rinse, sinus rinses can be very effective in reducing sinus headache and pressure.    You can try Cepacol lozenges or Cetacaine spray over-the-counter for sore throat pain.  You can also try gargling with warm salt water.    Placing a humidifier in your room at night may be beneficial for helping with nasal congestion.    PLEASE RETURN TO THE EMERGENCY DEPARTMENT IMMEDIATELY for worsening symptoms, persistent fever, nausea and/or vomiting, or if you develop any concerning symptoms such as: high fever not relieved by acetaminophen (Tylenol) and/or ibuprofen (Motrin / Advil), chills, shortness of breath, chest pain, feeling of your heart fluttering or racing, loss of consciousness, numbness, weakness or tingling in the arms or legs or change in color of the extremities, changes in mental status, persistent headache, blurry vision, loss of bladder / bowel control,

## 2025-07-20 NOTE — ED PROVIDER NOTES
[facility]  [address] [id number]  Phone: [phone]        Pt Name: Kp Harris  MRN: [id number]  Birthdate 1960  Date of evaluation: 7/20/25    CHIEFCOMPLAINT       Chief Complaint   Patient presents with    Migraine    Pharyngitis    Cough       HISTORY OF PRESENT ILLNESS (Location/Symptom, Timing/Onset, Context/Setting, Quality, Duration, Modifying Factors, Severity)      Kp Harris is a 64 y.o. male with pertinent PMH of cardiac cath, DVT, migraine headache, anemia who presents to the ED via private auto with complaint of congestion, ear fullness bilaterally, sore throat and intermittently productive cough for the last 6 days.  No fever or chills.  No nausea or vomiting.  No blurred or double vision with the headache, patient describes it as sinus pressure, concerned about sinus headache.  No neck pain or stiffness.  No body aches or muscle aches.  No chest pain or shortness of breath.  Coughing worse at night, does have a humidifier that he is using.  Also tried Benadryl at night for his symptoms without relief.  Has not tried any Tylenol, ibuprofen or over-the-counter medicine for his headache yet today.  Rating his headache pain 8 out of 10.  No difficulty swallowing with sore throat pain.  Patient reports he has had his tonsils removed twice and each time the tonsils partially grew back.    PAST MEDICAL / SURGICAL / SOCIAL / FAMILY HISTORY     PMH:  has a past medical history of Acute asthmatic bronchitis, Anemia, Anesthesia complication, Congestion and hemorrhage of prostate, Contusion of back(592.31), DDD (degenerative disc disease), H/O cardiac catheterization, Hx of blood clots, Impaired fasting glucose, Impotence of organic origin, Leaky heart valve, Major depressive disorder, single episode, moderate (HCC), Migraine with aura, with intractable migraine, so stated, without mention of status migrainosus, Other disorders of bone and cartilage(3.99),

## 2025-07-20 NOTE — ED NOTES
Pt arrives via private auto, ambulatory to room. Pt C/O sore throat, headache, and cough for past 4 days. Little relief from OTC meds. Pt breathing easy during during assessment. No S/S of distress noted.

## 2025-07-21 NOTE — ED PROVIDER NOTES
[facility]  [address] [facility] [address]  Phone: [phone]      Attending Physician Attestation          CHIEF COMPLAINT       Chief Complaint   Patient presents with    Migraine    Pharyngitis    Cough       DIAGNOSTIC RESULTS     LABS:  Labs Reviewed   RAPID STREP SCREEN       All other labs were within normal range or not returned as of this dictation.    RADIOLOGY:  XR CHEST (2 VW)   ED Interpretation   Right lower lobe opacity      Final Result   1.  No acute abnormality.               EMERGENCY DEPARTMENT COURSE:   Vitals:    Vitals:    07/20/25 1912 07/20/25 1956   BP: (!) 150/76    Pulse: (!) 104 97   Resp: 16    Temp: 98.5 °F (36.9 °C)    TempSrc: Oral    SpO2: 96%    Weight: 107 kg (236 lb)    Height: 1.905 m (6' 3\")      -------------------------  BP: (!) 150/76, Temp: 98.5 °F (36.9 °C), Pulse: 97, Respirations: 16      ED Course as of 07/21/25 0303   Sun Jul 20, 2025 1933 Strep A, Molecular: NEGATIVE [AJ]      ED Course User Index  [AJ] Stacie Lopez, APRN - CNP         PERTINENT ATTENDING PHYSICIAN COMMENTS:    I performed a history and physical examination of the patient and discussed management with the mid level provider. I reviewed the mid level provider's note and agree with the documented findings and plan of care. Any areas of disagreement are noted on the chart. I was personally present for the key portions of any procedures. I have documented in the chart those procedures where I was not present during the key portions. I have reviewed the emergency nurses triage note. I agree with the chief complaint, past medical history, past surgical history, allergies, medications, social and family history as documented unless otherwise noted below. Documentation of the HPI, Physical Exam and Medical Decision Making performed by mid level providers is based on my personal performance of the HPI, PE and MDM. For Residents, Physician Assistant/ Nurse Practitioner

## 2025-07-31 ENCOUNTER — TELEPHONE (OUTPATIENT)
Age: 65
End: 2025-07-31

## 2025-07-31 DIAGNOSIS — Z01.818 PRE-OP EXAM: Primary | ICD-10-CM

## 2025-07-31 NOTE — TELEPHONE ENCOUNTER
Patient has been scheduled for sx on 8/20. Instructions were given at the time of booking.  I called today and spoke to him/her to confirm the date and location of sx.  Arrival time is 11:30. I reminded  him/her to get PAT done (labs only).  We also scheduled the post op appointment. He states he has a lot of difficulty coming out of anesthesia in the past to the point he has had to stay over night in the hospital or return to the ER because his wife cannot manage him.

## 2025-08-11 ENCOUNTER — HOSPITAL ENCOUNTER (OUTPATIENT)
Dept: LAB | Facility: CLINIC | Age: 65
Discharge: HOME | End: 2025-08-11
Payer: COMMERCIAL

## 2025-08-11 DIAGNOSIS — Z01.818 PRE-OP EXAM: ICD-10-CM

## 2025-08-11 LAB
ANION GAP SERPL CALCULATED.3IONS-SCNC: 13 MMOL/L (ref 9–16)
BASOPHILS # BLD: 0.03 K/UL (ref 0–0.2)
BASOPHILS NFR BLD: 1 % (ref 0–2)
BUN SERPL-MCNC: 9 MG/DL (ref 8–23)
CALCIUM SERPL-MCNC: 9.1 MG/DL (ref 8.6–10.4)
CHLORIDE SERPL-SCNC: 106 MMOL/L (ref 98–107)
CO2 SERPL-SCNC: 23 MMOL/L (ref 20–31)
CREAT SERPL-MCNC: 1.2 MG/DL (ref 0.7–1.2)
EOSINOPHIL # BLD: 0.08 K/UL (ref 0–0.44)
EOSINOPHILS RELATIVE PERCENT: 2 % (ref 1–4)
ERYTHROCYTE [DISTWIDTH] IN BLOOD BY AUTOMATED COUNT: 12.1 % (ref 11.8–14.4)
GFR, ESTIMATED: 67 ML/MIN/1.73M2
GLUCOSE SERPL-MCNC: 120 MG/DL (ref 74–99)
HCT VFR BLD AUTO: 43.8 % (ref 40.7–50.3)
HGB BLD-MCNC: 14.9 G/DL (ref 13–17)
IMM GRANULOCYTES # BLD AUTO: <0.03 K/UL (ref 0–0.3)
IMM GRANULOCYTES NFR BLD: 0 %
LYMPHOCYTES NFR BLD: 1.64 K/UL (ref 1.1–3.7)
LYMPHOCYTES RELATIVE PERCENT: 31 % (ref 24–43)
MCH RBC QN AUTO: 29.6 PG (ref 25.2–33.5)
MCHC RBC AUTO-ENTMCNC: 34 G/DL (ref 28.4–34.8)
MCV RBC AUTO: 86.9 FL (ref 82.6–102.9)
MONOCYTES NFR BLD: 0.35 K/UL (ref 0.1–1.2)
MONOCYTES NFR BLD: 7 % (ref 3–12)
NEUTROPHILS NFR BLD: 59 % (ref 36–65)
NEUTS SEG NFR BLD: 3.27 K/UL (ref 1.5–8.1)
NRBC BLD-RTO: 0 PER 100 WBC
PLATELET # BLD AUTO: 179 K/UL (ref 138–453)
PMV BLD AUTO: 12.5 FL (ref 8.1–13.5)
POTASSIUM SERPL-SCNC: 3.8 MMOL/L (ref 3.7–5.3)
RBC # BLD AUTO: 5.04 M/UL (ref 4.21–5.77)
SODIUM SERPL-SCNC: 142 MMOL/L (ref 136–145)
WBC OTHER # BLD: 5.4 K/UL (ref 3.5–11.3)

## 2025-08-11 PROCEDURE — 85025 COMPLETE CBC W/AUTO DIFF WBC: CPT

## 2025-08-11 PROCEDURE — 80048 BASIC METABOLIC PNL TOTAL CA: CPT

## 2025-08-11 PROCEDURE — 36415 COLL VENOUS BLD VENIPUNCTURE: CPT

## 2025-08-14 ENCOUNTER — TELEPHONE (OUTPATIENT)
Age: 65
End: 2025-08-14

## 2025-08-18 ENCOUNTER — ANESTHESIA EVENT (OUTPATIENT)
Dept: OPERATING ROOM | Age: 65
End: 2025-08-18
Payer: COMMERCIAL

## 2025-08-20 ENCOUNTER — HOSPITAL ENCOUNTER (OUTPATIENT)
Age: 65
Setting detail: OUTPATIENT SURGERY
Discharge: HOME | End: 2025-08-20
Attending: ORTHOPAEDIC SURGERY | Admitting: ORTHOPAEDIC SURGERY
Payer: COMMERCIAL

## 2025-08-20 ENCOUNTER — ANESTHESIA (OUTPATIENT)
Dept: OPERATING ROOM | Age: 65
End: 2025-08-20
Payer: COMMERCIAL

## 2025-08-20 VITALS
SYSTOLIC BLOOD PRESSURE: 157 MMHG | HEIGHT: 75 IN | OXYGEN SATURATION: 97 % | RESPIRATION RATE: 10 BRPM | HEART RATE: 65 BPM | WEIGHT: 234.6 LBS | DIASTOLIC BLOOD PRESSURE: 98 MMHG | TEMPERATURE: 97.5 F | BODY MASS INDEX: 29.17 KG/M2

## 2025-08-20 PROCEDURE — 7100000000 HC PACU RECOVERY - FIRST 15 MIN: Performed by: ORTHOPAEDIC SURGERY

## 2025-08-20 PROCEDURE — 3600000014 HC SURGERY LEVEL 4 ADDTL 15MIN: Performed by: ORTHOPAEDIC SURGERY

## 2025-08-20 PROCEDURE — 2580000003 HC RX 258: Performed by: ANESTHESIOLOGY

## 2025-08-20 PROCEDURE — 6360000002 HC RX W HCPCS: Performed by: ANESTHESIOLOGY

## 2025-08-20 PROCEDURE — 3600000004 HC SURGERY LEVEL 4 BASE: Performed by: ORTHOPAEDIC SURGERY

## 2025-08-20 PROCEDURE — 3700000001 HC ADD 15 MINUTES (ANESTHESIA): Performed by: ORTHOPAEDIC SURGERY

## 2025-08-20 PROCEDURE — 7100000001 HC PACU RECOVERY - ADDTL 15 MIN: Performed by: ORTHOPAEDIC SURGERY

## 2025-08-20 PROCEDURE — 7100000011 HC PHASE II RECOVERY - ADDTL 15 MIN: Performed by: ORTHOPAEDIC SURGERY

## 2025-08-20 PROCEDURE — 6360000002 HC RX W HCPCS: Performed by: ORTHOPAEDIC SURGERY

## 2025-08-20 PROCEDURE — 3700000000 HC ANESTHESIA ATTENDED CARE: Performed by: ORTHOPAEDIC SURGERY

## 2025-08-20 PROCEDURE — 2709999900 HC NON-CHARGEABLE SUPPLY: Performed by: ORTHOPAEDIC SURGERY

## 2025-08-20 PROCEDURE — 6370000000 HC RX 637 (ALT 250 FOR IP): Performed by: ANESTHESIOLOGY

## 2025-08-20 PROCEDURE — 6360000002 HC RX W HCPCS

## 2025-08-20 PROCEDURE — 6370000000 HC RX 637 (ALT 250 FOR IP): Performed by: NURSE PRACTITIONER

## 2025-08-20 PROCEDURE — 7100000010 HC PHASE II RECOVERY - FIRST 15 MIN: Performed by: ORTHOPAEDIC SURGERY

## 2025-08-20 PROCEDURE — 64447 NJX AA&/STRD FEMORAL NRV IMG: CPT | Performed by: ANESTHESIOLOGY

## 2025-08-20 PROCEDURE — 6360000002 HC RX W HCPCS: Performed by: NURSE PRACTITIONER

## 2025-08-20 RX ORDER — GLYCOPYRROLATE 0.2 MG/ML
INJECTION INTRAMUSCULAR; INTRAVENOUS
Status: DISCONTINUED | OUTPATIENT
Start: 2025-08-20 | End: 2025-08-20 | Stop reason: SDUPTHER

## 2025-08-20 RX ORDER — SODIUM CHLORIDE 9 MG/ML
INJECTION, SOLUTION INTRAVENOUS PRN
Status: DISCONTINUED | OUTPATIENT
Start: 2025-08-20 | End: 2025-08-20 | Stop reason: HOSPADM

## 2025-08-20 RX ORDER — FENTANYL CITRATE 50 UG/ML
100 INJECTION, SOLUTION INTRAMUSCULAR; INTRAVENOUS
Status: COMPLETED | OUTPATIENT
Start: 2025-08-20 | End: 2025-08-20

## 2025-08-20 RX ORDER — ASPIRIN 81 MG/1
81 TABLET ORAL 2 TIMES DAILY
Qty: 60 TABLET | Refills: 0 | Status: SHIPPED | OUTPATIENT
Start: 2025-08-20 | End: 2025-09-19

## 2025-08-20 RX ORDER — MIDAZOLAM HYDROCHLORIDE 2 MG/2ML
2 INJECTION, SOLUTION INTRAMUSCULAR; INTRAVENOUS
Status: COMPLETED | OUTPATIENT
Start: 2025-08-20 | End: 2025-08-20

## 2025-08-20 RX ORDER — HYDRALAZINE HYDROCHLORIDE 20 MG/ML
10 INJECTION INTRAMUSCULAR; INTRAVENOUS
Status: DISCONTINUED | OUTPATIENT
Start: 2025-08-20 | End: 2025-08-20 | Stop reason: HOSPADM

## 2025-08-20 RX ORDER — SODIUM CHLORIDE, SODIUM LACTATE, POTASSIUM CHLORIDE, CALCIUM CHLORIDE 600; 310; 30; 20 MG/100ML; MG/100ML; MG/100ML; MG/100ML
INJECTION, SOLUTION INTRAVENOUS CONTINUOUS
Status: DISCONTINUED | OUTPATIENT
Start: 2025-08-20 | End: 2025-08-20 | Stop reason: HOSPADM

## 2025-08-20 RX ORDER — METOCLOPRAMIDE HYDROCHLORIDE 5 MG/ML
10 INJECTION INTRAMUSCULAR; INTRAVENOUS
Status: COMPLETED | OUTPATIENT
Start: 2025-08-20 | End: 2025-08-20

## 2025-08-20 RX ORDER — SODIUM CHLORIDE 0.9 % (FLUSH) 0.9 %
5-40 SYRINGE (ML) INJECTION PRN
Status: DISCONTINUED | OUTPATIENT
Start: 2025-08-20 | End: 2025-08-20 | Stop reason: HOSPADM

## 2025-08-20 RX ORDER — DEXAMETHASONE SODIUM PHOSPHATE 10 MG/ML
10 INJECTION, SOLUTION INTRAMUSCULAR; INTRAVENOUS ONCE
Status: DISCONTINUED | OUTPATIENT
Start: 2025-08-20 | End: 2025-08-20 | Stop reason: HOSPADM

## 2025-08-20 RX ORDER — MORPHINE SULFATE 2 MG/ML
1 INJECTION, SOLUTION INTRAMUSCULAR; INTRAVENOUS EVERY 5 MIN PRN
Status: DISCONTINUED | OUTPATIENT
Start: 2025-08-20 | End: 2025-08-20 | Stop reason: HOSPADM

## 2025-08-20 RX ORDER — ONDANSETRON 2 MG/ML
4 INJECTION INTRAMUSCULAR; INTRAVENOUS
Status: COMPLETED | OUTPATIENT
Start: 2025-08-20 | End: 2025-08-20

## 2025-08-20 RX ORDER — BUPIVACAINE HYDROCHLORIDE 2.5 MG/ML
INJECTION, SOLUTION EPIDURAL; INFILTRATION; INTRACAUDAL; PERINEURAL
Status: COMPLETED | OUTPATIENT
Start: 2025-08-20 | End: 2025-08-20

## 2025-08-20 RX ORDER — LABETALOL HYDROCHLORIDE 5 MG/ML
10 INJECTION, SOLUTION INTRAVENOUS
Status: DISCONTINUED | OUTPATIENT
Start: 2025-08-20 | End: 2025-08-20 | Stop reason: HOSPADM

## 2025-08-20 RX ORDER — DIPHENHYDRAMINE HYDROCHLORIDE 50 MG/ML
12.5 INJECTION, SOLUTION INTRAMUSCULAR; INTRAVENOUS
Status: DISCONTINUED | OUTPATIENT
Start: 2025-08-20 | End: 2025-08-20 | Stop reason: HOSPADM

## 2025-08-20 RX ORDER — HALOPERIDOL 5 MG/ML
1 INJECTION INTRAMUSCULAR ONCE
Status: COMPLETED | OUTPATIENT
Start: 2025-08-20 | End: 2025-08-20

## 2025-08-20 RX ORDER — FENTANYL CITRATE 50 UG/ML
INJECTION, SOLUTION INTRAMUSCULAR; INTRAVENOUS
Status: DISCONTINUED | OUTPATIENT
Start: 2025-08-20 | End: 2025-08-20 | Stop reason: SDUPTHER

## 2025-08-20 RX ORDER — SODIUM CHLORIDE 0.9 % (FLUSH) 0.9 %
5-40 SYRINGE (ML) INJECTION EVERY 12 HOURS SCHEDULED
Status: DISCONTINUED | OUTPATIENT
Start: 2025-08-20 | End: 2025-08-20 | Stop reason: HOSPADM

## 2025-08-20 RX ORDER — ROPIVACAINE HYDROCHLORIDE 5 MG/ML
INJECTION, SOLUTION EPIDURAL; INFILTRATION; PERINEURAL PRN
Status: DISCONTINUED | OUTPATIENT
Start: 2025-08-20 | End: 2025-08-20 | Stop reason: ALTCHOICE

## 2025-08-20 RX ORDER — MEPERIDINE HYDROCHLORIDE 50 MG/ML
12.5 INJECTION INTRAMUSCULAR; INTRAVENOUS; SUBCUTANEOUS ONCE
Status: DISCONTINUED | OUTPATIENT
Start: 2025-08-20 | End: 2025-08-20 | Stop reason: HOSPADM

## 2025-08-20 RX ORDER — ONDANSETRON 2 MG/ML
INJECTION INTRAMUSCULAR; INTRAVENOUS
Status: DISCONTINUED | OUTPATIENT
Start: 2025-08-20 | End: 2025-08-20 | Stop reason: SDUPTHER

## 2025-08-20 RX ORDER — MIDAZOLAM HYDROCHLORIDE 2 MG/2ML
2 INJECTION, SOLUTION INTRAMUSCULAR; INTRAVENOUS
Status: DISCONTINUED | OUTPATIENT
Start: 2025-08-20 | End: 2025-08-20 | Stop reason: HOSPADM

## 2025-08-20 RX ORDER — OXYCODONE HYDROCHLORIDE 5 MG/1
5 TABLET ORAL PRN
Status: COMPLETED | OUTPATIENT
Start: 2025-08-20 | End: 2025-08-20

## 2025-08-20 RX ORDER — LIDOCAINE HYDROCHLORIDE 10 MG/ML
INJECTION, SOLUTION EPIDURAL; INFILTRATION; INTRACAUDAL; PERINEURAL
Status: DISCONTINUED | OUTPATIENT
Start: 2025-08-20 | End: 2025-08-20 | Stop reason: SDUPTHER

## 2025-08-20 RX ORDER — PROPOFOL 10 MG/ML
INJECTION, EMULSION INTRAVENOUS
Status: DISCONTINUED | OUTPATIENT
Start: 2025-08-20 | End: 2025-08-20 | Stop reason: SDUPTHER

## 2025-08-20 RX ORDER — ACETAMINOPHEN 500 MG
1000 TABLET ORAL ONCE
Status: COMPLETED | OUTPATIENT
Start: 2025-08-20 | End: 2025-08-20

## 2025-08-20 RX ORDER — DEXAMETHASONE SODIUM PHOSPHATE 10 MG/ML
INJECTION, SOLUTION INTRAMUSCULAR; INTRAVENOUS
Status: COMPLETED | OUTPATIENT
Start: 2025-08-20 | End: 2025-08-20

## 2025-08-20 RX ORDER — OXYCODONE HYDROCHLORIDE 5 MG/1
10 TABLET ORAL PRN
Status: COMPLETED | OUTPATIENT
Start: 2025-08-20 | End: 2025-08-20

## 2025-08-20 RX ORDER — SCOPOLAMINE 1 MG/3D
1 PATCH, EXTENDED RELEASE TRANSDERMAL
Status: DISCONTINUED | OUTPATIENT
Start: 2025-08-20 | End: 2025-08-20 | Stop reason: HOSPADM

## 2025-08-20 RX ADMIN — PROPOFOL 230 MG: 10 INJECTION, EMULSION INTRAVENOUS at 13:34

## 2025-08-20 RX ADMIN — ONDANSETRON 4 MG: 2 INJECTION, SOLUTION INTRAMUSCULAR; INTRAVENOUS at 14:51

## 2025-08-20 RX ADMIN — FENTANYL CITRATE 50 MCG: 50 INJECTION, SOLUTION INTRAMUSCULAR; INTRAVENOUS at 13:46

## 2025-08-20 RX ADMIN — ONDANSETRON 4 MG: 2 INJECTION, SOLUTION INTRAMUSCULAR; INTRAVENOUS at 14:09

## 2025-08-20 RX ADMIN — GLYCOPYRROLATE 0.2 MG: 0.2 INJECTION INTRAMUSCULAR; INTRAVENOUS at 14:09

## 2025-08-20 RX ADMIN — Medication 2000 MG: at 13:44

## 2025-08-20 RX ADMIN — FENTANYL CITRATE 100 MCG: 50 INJECTION INTRAMUSCULAR; INTRAVENOUS at 11:35

## 2025-08-20 RX ADMIN — BUPIVACAINE HYDROCHLORIDE 15 ML: 2.5 INJECTION, SOLUTION EPIDURAL; INFILTRATION; INTRACAUDAL; PERINEURAL at 11:37

## 2025-08-20 RX ADMIN — MIDAZOLAM HYDROCHLORIDE 2 MG: 1 INJECTION, SOLUTION INTRAMUSCULAR; INTRAVENOUS at 11:34

## 2025-08-20 RX ADMIN — GLYCOPYRROLATE 0.2 MG: 0.2 INJECTION INTRAMUSCULAR; INTRAVENOUS at 14:08

## 2025-08-20 RX ADMIN — METOCLOPRAMIDE 10 MG: 5 INJECTION, SOLUTION INTRAMUSCULAR; INTRAVENOUS at 14:59

## 2025-08-20 RX ADMIN — ACETAMINOPHEN 1000 MG: 500 TABLET ORAL at 11:15

## 2025-08-20 RX ADMIN — DEXAMETHASONE SODIUM PHOSPHATE 10 MG: 10 INJECTION, SOLUTION INTRAMUSCULAR; INTRAVENOUS at 11:37

## 2025-08-20 RX ADMIN — LIDOCAINE HYDROCHLORIDE 100 MG: 10 INJECTION, SOLUTION EPIDURAL; INFILTRATION; INTRACAUDAL; PERINEURAL at 13:34

## 2025-08-20 RX ADMIN — SODIUM CHLORIDE, SODIUM LACTATE, POTASSIUM CHLORIDE, AND CALCIUM CHLORIDE: .6; .31; .03; .02 INJECTION, SOLUTION INTRAVENOUS at 11:23

## 2025-08-20 RX ADMIN — OXYCODONE HYDROCHLORIDE 5 MG: 5 TABLET ORAL at 15:16

## 2025-08-20 RX ADMIN — FENTANYL CITRATE 50 MCG: 50 INJECTION, SOLUTION INTRAMUSCULAR; INTRAVENOUS at 13:54

## 2025-08-20 RX ADMIN — HALOPERIDOL LACTATE 1 MG: 5 INJECTION, SOLUTION INTRAMUSCULAR at 15:51

## 2025-08-20 ASSESSMENT — ENCOUNTER SYMPTOMS: SHORTNESS OF BREATH: 1

## 2025-08-20 ASSESSMENT — PAIN DESCRIPTION - DESCRIPTORS
DESCRIPTORS: ACHING
DESCRIPTORS: BURNING;NAGGING

## 2025-08-20 ASSESSMENT — PAIN SCALES - GENERAL
PAINLEVEL_OUTOF10: 0
PAINLEVEL_OUTOF10: 0
PAINLEVEL_OUTOF10: 5
PAINLEVEL_OUTOF10: 0
PAINLEVEL_OUTOF10: 0
PAINLEVEL_OUTOF10: 7
PAINLEVEL_OUTOF10: 6
PAINLEVEL_OUTOF10: 0
PAINLEVEL_OUTOF10: 5
PAINLEVEL_OUTOF10: 0
PAINLEVEL_OUTOF10: 0

## 2025-08-20 ASSESSMENT — PAIN - FUNCTIONAL ASSESSMENT
PAIN_FUNCTIONAL_ASSESSMENT: 0-10
PAIN_FUNCTIONAL_ASSESSMENT: 0-10
PAIN_FUNCTIONAL_ASSESSMENT: PREVENTS OR INTERFERES SOME ACTIVE ACTIVITIES AND ADLS

## 2025-08-20 ASSESSMENT — PAIN DESCRIPTION - LOCATION: LOCATION: KNEE

## 2025-08-20 ASSESSMENT — PAIN DESCRIPTION - ORIENTATION: ORIENTATION: RIGHT

## 2025-09-04 ENCOUNTER — OFFICE VISIT (OUTPATIENT)
Age: 65
End: 2025-09-04

## 2025-09-04 DIAGNOSIS — S83.281A TEAR OF LATERAL MENISCUS OF RIGHT KNEE, CURRENT, UNSPECIFIED TEAR TYPE, INITIAL ENCOUNTER: Primary | ICD-10-CM

## 2025-09-04 DIAGNOSIS — M94.261 CHONDROMALACIA OF RIGHT KNEE: ICD-10-CM

## 2025-09-04 PROCEDURE — 99024 POSTOP FOLLOW-UP VISIT: CPT | Performed by: ORTHOPAEDIC SURGERY

## (undated) DEVICE — ZIMMER® STERILE DISPOSABLE TOURNIQUET CUFF WITH PROTECTIVE SLEEVE AND PLC, DUAL PORT, SINGLE BLADDER, 30 IN. (76 CM)

## (undated) DEVICE — BLANKET WARMING L 2010 X W 760 MM UPPER BODY 2 HOSE INLET

## (undated) DEVICE — DRAPE ARTHRO W90XL121IN 100% POLYPR SMS FAB ABSRB REINF FEN

## (undated) DEVICE — PADDING CAST W4INXL4YD NONSTERILE COT COHESIVE HND TEARABLE

## (undated) DEVICE — GLOVE SURG SZ 8 L12IN THK75MIL DK GRN LTX FREE

## (undated) DEVICE — BANDAGE 6 IN ESMARCH COMPR W6INXL9FT E SMOOTH FINISH USED DURING LIMB

## (undated) DEVICE — Device

## (undated) DEVICE — MEDICINE CUP, GRADUATED, STER: Brand: MEDLINE

## (undated) DEVICE — PADDING CAST W6INXL4YD COT LO LINTING WYTEX

## (undated) DEVICE — GAUZE,SPONGE,4"X4",16PLY,STRL,LF,10/TRAY: Brand: MEDLINE

## (undated) DEVICE — CUSHION PRONEVIEW L HD NK FOAM

## (undated) DEVICE — STOCKINETTE ORTH W12XL48IN COT 2 PLY HLLW FOR HANDLING LMB

## (undated) DEVICE — BLADE SHV L13CM DIA4MM EXCALIBUR AGG COOLCUT

## (undated) DEVICE — PAD ABD W5XL9IN GEN USE NONADHESIVE EXTRA ABSRB SFT

## (undated) DEVICE — GLOVE SURG SZ 65 L12IN FNGR THK87MIL WHT LTX FREE

## (undated) DEVICE — FORCEPS BX L240CM WRK CHN 2.8MM STD CAP W/ NDL MIC MESH

## (undated) DEVICE — Z DISCONTINUED BY MEDLINE USE 2271199 TRAY PREP WET 4% CHG SCRB SOL

## (undated) DEVICE — CUFF TOURNIQUET 30X4 SINGLE BLADDER SINGLE PORT REPROC

## (undated) DEVICE — BANDAGE COMPR W6INXL5YD WHT BGE POLY COT WV E HK LOOP CLSR

## (undated) DEVICE — SUTURE PROL SZ 3-0 L18IN NONABSORBABLE BLU L24MM FS-1 3/8 8684G

## (undated) DEVICE — SOL IRR SOD CHL 0.9% TITAN XL CNTNR 3000ML

## (undated) DEVICE — NEEDLE HYPO 18GA L1.5IN STD POLYPR HUB S STL REG BVL ULT

## (undated) DEVICE — SPLINT THMB W5XL30IN FBRGLS PD PRECUT LTWT DURABLE FAST SET

## (undated) DEVICE — SAVARY GILLIARD WIRE GUIDE: Brand: SAVARY GILLIARD

## (undated) DEVICE — BANDAGE COBAN 6 IN WND 6INX5YD FOAM

## (undated) DEVICE — CO2 CANNULA,SUPERSOFT, ADLT,7'O2,7'CO2: Brand: MEDLINE

## (undated) DEVICE — PAD,ABDOMINAL,5"X9",ST,LF,25/BX: Brand: MEDLINE INDUSTRIES, INC.

## (undated) DEVICE — GLOVE ORANGE PI 7 1/2   MSG9075

## (undated) DEVICE — JELLY,LUBE,STERILE,FLIP TOP,TUBE,2-OZ: Brand: MEDLINE

## (undated) DEVICE — HOOK LOCK LATEX FREE ELASTIC BANDAGE 6INX5YD

## (undated) DEVICE — CUP MED 1OZ CLR POLYPR FEED GRAD W/O LID

## (undated) DEVICE — CLOTH PRE OP W9XL10.5IN 2% CHG FRAGRANCE RNS FREE READYPREP

## (undated) DEVICE — MEDI-VAC SUCTION HANDLE REGULAR CAPACITY: Brand: CARDINAL HEALTH

## (undated) DEVICE — APPLICATOR MEDICATED 26 CC SOLUTION HI LT ORNG CHLORAPREP

## (undated) DEVICE — INTENDED FOR TISSUE SEPARATION, AND OTHER PROCEDURES THAT REQUIRE A SHARP SURGICAL BLADE TO PUNCTURE OR CUT.: Brand: BARD-PARKER ® CARBON RIB-BACK BLADES

## (undated) DEVICE — GLOVE ORANGE PI 8   MSG9080

## (undated) DEVICE — ELECTRODE PT RET AD L9FT HI MOIST COND ADH HYDRGEL CORDED

## (undated) DEVICE — Z INACTIVE USE 2660664 SOLUTION IRRIG 3000ML 0.9% SOD CHL USP UROMATIC PLAS CONT

## (undated) DEVICE — BASIN EMSIS 700ML GRAPHITE PLAS KID SHP GRAD

## (undated) DEVICE — PADDING CAST W6INXL4YD ST COT RAYON MICROPLEATED HIGHLY

## (undated) DEVICE — SYRINGE MED 30ML STD CLR PLAS LUERLOCK TIP N CTRL DISP

## (undated) DEVICE — TUBING PMP L16FT MAIN DISP FOR AR-6400 AR-6475 Â€“ ORDER MULTIPLES OF 10 EACH

## (undated) DEVICE — GLOVE SURG SZ 75 L12IN FNGR THK87MIL DK GRN LTX FREE ISOLEX

## (undated) DEVICE — GLOVE SURG SZ 65 THK91MIL LTX FREE SYN POLYISOPRENE

## (undated) DEVICE — 3M™ WARMING BLANKET, UPPER BODY, 10 PER CASE, 42268: Brand: BAIR HUGGER™

## (undated) DEVICE — STRAP POS FOAM SFT STR FOR KNEE BODY

## (undated) DEVICE — GLOVE SURG SZ 7 L12IN FNGR THK87MIL WHT LTX FREE

## (undated) DEVICE — GOWN,AURORA,NON-REINFORCED,2XL: Brand: MEDLINE

## (undated) DEVICE — GLOVE SURG SZ 75 L12IN FNGR THK87MIL WHT LTX FREE

## (undated) DEVICE — ADAPTER TBNG LUER STUB 15 GA INTMED

## (undated) DEVICE — Device: Brand: DEFENDO VALVE AND CONNECTOR KIT

## (undated) DEVICE — BLOCK BITE 60FR RUBBER ADLT DENTAL

## (undated) DEVICE — GLOVE ORANGE PI 7   MSG9070